# Patient Record
Sex: MALE | Race: BLACK OR AFRICAN AMERICAN | Employment: FULL TIME | URBAN - METROPOLITAN AREA
[De-identification: names, ages, dates, MRNs, and addresses within clinical notes are randomized per-mention and may not be internally consistent; named-entity substitution may affect disease eponyms.]

---

## 2020-08-21 ENCOUNTER — HOSPITAL ENCOUNTER (OUTPATIENT)
Age: 65
Discharge: HOME | End: 2020-08-21
Payer: COMMERCIAL

## 2020-08-21 LAB
BASOPHILS ABSOLUTE: 0.1 K/UL (ref 0–0.2)
BASOPHILS RELATIVE PERCENT: 1 %
EOSINOPHILS ABSOLUTE: 0.5 K/UL (ref 0–0.6)
EOSINOPHILS RELATIVE PERCENT: 4 %
HCT VFR BLD CALC: 39.8 % (ref 40.5–52.5)
HEMOGLOBIN: 13.3 G/DL (ref 13.5–17.5)
LYMPHOCYTES ABSOLUTE: 3.4 K/UL (ref 1–5.1)
LYMPHOCYTES RELATIVE PERCENT: 30 %
MCH RBC QN AUTO: 27.7 PG (ref 26–34)
MCHC RBC AUTO-ENTMCNC: 33.4 G/DL (ref 31–36)
MCV RBC AUTO: 83 FL (ref 80–100)
MONOCYTES ABSOLUTE: 1.4 K/UL (ref 0–1.3)
MONOCYTES RELATIVE PERCENT: 12 %
NEUTROPHILS ABSOLUTE: 6 K/UL (ref 1.7–7.7)
NEUTROPHILS RELATIVE PERCENT: 53 %
PDW BLD-RTO: 14.5 % (ref 12.4–15.4)
PLATELET # BLD: 296 K/UL (ref 135–450)
PLATELET SLIDE REVIEW: ADEQUATE
PMV BLD AUTO: 7.7 FL (ref 5–10.5)
RBC # BLD: 4.8 M/UL (ref 4.2–5.9)
RBC # BLD: NORMAL 10*6/UL
SLIDE REVIEW: ABNORMAL
WBC # BLD: 11.4 K/UL (ref 4–11)

## 2020-08-21 PROCEDURE — 85025 COMPLETE CBC W/AUTO DIFF WBC: CPT

## 2020-08-21 PROCEDURE — 36415 COLL VENOUS BLD VENIPUNCTURE: CPT

## 2020-09-03 ENCOUNTER — NURSE ONLY (OUTPATIENT)
Dept: PRIMARY CARE CLINIC | Age: 65
End: 2020-09-03
Payer: COMMERCIAL

## 2020-09-03 PROCEDURE — 99211 OFF/OP EST MAY X REQ PHY/QHP: CPT | Performed by: NURSE PRACTITIONER

## 2020-09-04 LAB — SARS-COV-2, NAA: NOT DETECTED

## 2020-09-08 ENCOUNTER — ANESTHESIA EVENT (OUTPATIENT)
Dept: ENDOSCOPY | Age: 65
End: 2020-09-08
Payer: COMMERCIAL

## 2020-09-08 NOTE — H&P
History and Physical / Pre-Sedation Assessment    Patient:  Olena Vazquez   :   1955     Intended Procedure:  EGD and colonoscopy    HPI: recurrent heartburn with dyspepsia and dysphagia. H/o multiple colon polyps over seven years ago. Fh of breast cancer    Past Medical History:  RBBB. Oa. hyperlipidemia    Past Surgical History:  none    Medications:  Prior to Admission medications    Medication Sig Start Date End Date Taking? Authorizing Provider   omeprazole (PRILOSEC) 20 MG capsule Take 20 mg by mouth daily. Historical Provider, MD   metoprolol (TOPROL XL) 25 MG XL tablet Take 1 tablet by mouth daily. 12   Ghazal Min MD       Family History: Mother with breast cancer. Social History:   reports that he has been smoking. He has never used smokeless tobacco. He reports that he does not drink alcohol or use drugs. Allergies:  PCN    ROS:  twelve point system review was unremarkable except for above noted history. Nurses notes reviewed and agreed. Medications reviewed    Physical Exam:  Vital Signs: There were no vitals taken for this visit. Skin: normal  HEENT: normal  Neck: supple. No adenopathy. No thyromegaly. No JVD. Pulmonary:Normal  Cardiac:Normal  Abdomen: tender epigastric area  MS: normal  Neuro: normal  Ext: no edema. Pulses normal    Pre-Procedure Assessment / Plan:  ASA 2 - Patient with mild systemic disease with no functional limitations  Mallampati Airway Assessment:  Mallampati Class I - (soft palate, fauces, uvula & anterior/posterior tonsillar pillars are visible)  Level of Sedation Plan:Mac sedation  Post Procedure plan: Return to same level of care    I assessed the patient and find that the patient is in satisfactory condition to proceed with the planned procedure and sedation plan. I have explained the risk, benefits, and alternatives to the procedure. The patient understands and agrees to proceed.   Karan Alvarez  10:47 AM 2020

## 2020-09-09 ENCOUNTER — ANESTHESIA (OUTPATIENT)
Dept: ENDOSCOPY | Age: 65
End: 2020-09-09
Payer: COMMERCIAL

## 2020-09-09 ENCOUNTER — HOSPITAL ENCOUNTER (OUTPATIENT)
Age: 65
Setting detail: OUTPATIENT SURGERY
Discharge: HOME | End: 2020-09-09
Attending: INTERNAL MEDICINE | Admitting: INTERNAL MEDICINE
Payer: COMMERCIAL

## 2020-09-09 VITALS
BODY MASS INDEX: 22.22 KG/M2 | DIASTOLIC BLOOD PRESSURE: 68 MMHG | WEIGHT: 150 LBS | RESPIRATION RATE: 16 BRPM | HEART RATE: 54 BPM | OXYGEN SATURATION: 97 % | HEIGHT: 69 IN | TEMPERATURE: 96.4 F | SYSTOLIC BLOOD PRESSURE: 136 MMHG

## 2020-09-09 VITALS — OXYGEN SATURATION: 98 % | DIASTOLIC BLOOD PRESSURE: 59 MMHG | SYSTOLIC BLOOD PRESSURE: 97 MMHG

## 2020-09-09 PROCEDURE — 7100000010 HC PHASE II RECOVERY - FIRST 15 MIN: Performed by: INTERNAL MEDICINE

## 2020-09-09 PROCEDURE — 3609012400 HC EGD TRANSORAL BIOPSY SINGLE/MULTIPLE: Performed by: INTERNAL MEDICINE

## 2020-09-09 PROCEDURE — 88342 IMHCHEM/IMCYTCHM 1ST ANTB: CPT

## 2020-09-09 PROCEDURE — 2580000003 HC RX 258: Performed by: ANESTHESIOLOGY

## 2020-09-09 PROCEDURE — 3609017700 HC EGD DILATION GASTRIC/DUODENAL STRICTURE: Performed by: INTERNAL MEDICINE

## 2020-09-09 PROCEDURE — 6360000002 HC RX W HCPCS: Performed by: NURSE ANESTHETIST, CERTIFIED REGISTERED

## 2020-09-09 PROCEDURE — C1726 CATH, BAL DIL, NON-VASCULAR: HCPCS | Performed by: INTERNAL MEDICINE

## 2020-09-09 PROCEDURE — 3609010300 HC COLONOSCOPY W/BIOPSY SINGLE/MULTIPLE: Performed by: INTERNAL MEDICINE

## 2020-09-09 PROCEDURE — 2709999900 HC NON-CHARGEABLE SUPPLY: Performed by: INTERNAL MEDICINE

## 2020-09-09 PROCEDURE — 7100000011 HC PHASE II RECOVERY - ADDTL 15 MIN: Performed by: INTERNAL MEDICINE

## 2020-09-09 PROCEDURE — 3700000001 HC ADD 15 MINUTES (ANESTHESIA): Performed by: INTERNAL MEDICINE

## 2020-09-09 PROCEDURE — 88305 TISSUE EXAM BY PATHOLOGIST: CPT

## 2020-09-09 PROCEDURE — 2500000003 HC RX 250 WO HCPCS: Performed by: NURSE ANESTHETIST, CERTIFIED REGISTERED

## 2020-09-09 PROCEDURE — 2580000003 HC RX 258: Performed by: NURSE ANESTHETIST, CERTIFIED REGISTERED

## 2020-09-09 PROCEDURE — 3700000000 HC ANESTHESIA ATTENDED CARE: Performed by: INTERNAL MEDICINE

## 2020-09-09 PROCEDURE — 2500000003 HC RX 250 WO HCPCS: Performed by: INTERNAL MEDICINE

## 2020-09-09 RX ORDER — SODIUM CHLORIDE 0.9 % (FLUSH) 0.9 %
10 SYRINGE (ML) INJECTION EVERY 12 HOURS SCHEDULED
Status: DISCONTINUED | OUTPATIENT
Start: 2020-09-09 | End: 2020-09-09 | Stop reason: HOSPADM

## 2020-09-09 RX ORDER — SODIUM CHLORIDE 0.9 % (FLUSH) 0.9 %
10 SYRINGE (ML) INJECTION PRN
Status: DISCONTINUED | OUTPATIENT
Start: 2020-09-09 | End: 2020-09-09 | Stop reason: HOSPADM

## 2020-09-09 RX ORDER — SODIUM CHLORIDE, SODIUM LACTATE, POTASSIUM CHLORIDE, CALCIUM CHLORIDE 600; 310; 30; 20 MG/100ML; MG/100ML; MG/100ML; MG/100ML
INJECTION, SOLUTION INTRAVENOUS CONTINUOUS
Status: DISCONTINUED | OUTPATIENT
Start: 2020-09-09 | End: 2020-09-09 | Stop reason: HOSPADM

## 2020-09-09 RX ORDER — SODIUM CHLORIDE 9 MG/ML
INJECTION, SOLUTION INTRAVENOUS CONTINUOUS
Status: DISCONTINUED | OUTPATIENT
Start: 2020-09-09 | End: 2020-09-09 | Stop reason: HOSPADM

## 2020-09-09 RX ORDER — GLYCOPYRROLATE 0.2 MG/ML
INJECTION INTRAMUSCULAR; INTRAVENOUS PRN
Status: DISCONTINUED | OUTPATIENT
Start: 2020-09-09 | End: 2020-09-09 | Stop reason: SDUPTHER

## 2020-09-09 RX ORDER — PROPOFOL 10 MG/ML
INJECTION, EMULSION INTRAVENOUS CONTINUOUS PRN
Status: DISCONTINUED | OUTPATIENT
Start: 2020-09-09 | End: 2020-09-09 | Stop reason: SDUPTHER

## 2020-09-09 RX ORDER — PROPOFOL 10 MG/ML
INJECTION, EMULSION INTRAVENOUS PRN
Status: DISCONTINUED | OUTPATIENT
Start: 2020-09-09 | End: 2020-09-09 | Stop reason: SDUPTHER

## 2020-09-09 RX ORDER — LIDOCAINE HYDROCHLORIDE 20 MG/ML
INJECTION, SOLUTION EPIDURAL; INFILTRATION; INTRACAUDAL; PERINEURAL PRN
Status: DISCONTINUED | OUTPATIENT
Start: 2020-09-09 | End: 2020-09-09 | Stop reason: SDUPTHER

## 2020-09-09 RX ORDER — SODIUM CHLORIDE, SODIUM LACTATE, POTASSIUM CHLORIDE, CALCIUM CHLORIDE 600; 310; 30; 20 MG/100ML; MG/100ML; MG/100ML; MG/100ML
INJECTION, SOLUTION INTRAVENOUS CONTINUOUS PRN
Status: DISCONTINUED | OUTPATIENT
Start: 2020-09-09 | End: 2020-09-09 | Stop reason: SDUPTHER

## 2020-09-09 RX ADMIN — SODIUM CHLORIDE, SODIUM LACTATE, POTASSIUM CHLORIDE, AND CALCIUM CHLORIDE: 600; 310; 30; 20 INJECTION, SOLUTION INTRAVENOUS at 09:55

## 2020-09-09 RX ADMIN — PROPOFOL 50 MG: 10 INJECTION, EMULSION INTRAVENOUS at 10:01

## 2020-09-09 RX ADMIN — LIDOCAINE HYDROCHLORIDE 5 ML: 20 INJECTION, SOLUTION EPIDURAL; INFILTRATION; INTRACAUDAL; PERINEURAL at 10:01

## 2020-09-09 RX ADMIN — PROPOFOL 30 MG: 10 INJECTION, EMULSION INTRAVENOUS at 10:02

## 2020-09-09 RX ADMIN — GLYCOPYRROLATE 0.2 MG: 0.2 INJECTION INTRAMUSCULAR; INTRAVENOUS at 09:59

## 2020-09-09 RX ADMIN — SODIUM CHLORIDE, POTASSIUM CHLORIDE, SODIUM LACTATE AND CALCIUM CHLORIDE: 600; 310; 30; 20 INJECTION, SOLUTION INTRAVENOUS at 09:22

## 2020-09-09 RX ADMIN — PROPOFOL 100 MCG/KG/MIN: 10 INJECTION, EMULSION INTRAVENOUS at 10:01

## 2020-09-09 ASSESSMENT — PULMONARY FUNCTION TESTS
PIF_VALUE: 0

## 2020-09-09 ASSESSMENT — PAIN SCALES - GENERAL
PAINLEVEL_OUTOF10: 0
PAINLEVEL_OUTOF10: 0

## 2020-09-09 ASSESSMENT — ENCOUNTER SYMPTOMS: SHORTNESS OF BREATH: 0

## 2020-09-09 ASSESSMENT — LIFESTYLE VARIABLES: SMOKING_STATUS: 1

## 2020-09-09 ASSESSMENT — PAIN SCALES - WONG BAKER
WONGBAKER_NUMERICALRESPONSE: 0

## 2020-09-09 ASSESSMENT — PAIN - FUNCTIONAL ASSESSMENT: PAIN_FUNCTIONAL_ASSESSMENT: 0-10

## 2020-09-09 NOTE — OP NOTE
[facility] [address], [address]                                OPERATIVE REPORT    PATIENT NAME: Clifton Celaya                     :        1955  MED REC NO:   [id number]                          ROOM:  ACCOUNT NO:   [de-identified]                           ADMIT DATE: 2020  PROVIDER:     Eliceo Mckeon MD    DATE OF PROCEDURE:  2020    SURGEON:  Eliceo Mckeon MD    INDICATIONS FOR PROCEDURE:  1.  Heartburn, dyspepsia, and dysphagia. 2.  History of numerous colon polyps. 3.  Family history of breast cancer. EGD:  With the patient in the left lateral position and after IV  Diprivan, the Olympus video endoscope was introduced into the esophagus  and advanced toward the gastroesophageal junction. Small hiatus hernia  was noted, from which biopsies were obtained. Given the patient's  history of dysphagia, trial of dilation was performed using balloon,  size 18 mm. Stomach was carefully inspected and revealed mild antral  gastritis and biopsies were obtained to evaluate for Helicobacter  pylori. The duodenum was normal.  Scope was then removed without  complications. COLONOSCOPY:  The Olympus video colonoscope was then inserted into the  rectum and carefully advanced to the cecum and redundant colon. Two  rectosigmoid polyps were seen and they were both removed with the biopsy  forceps. Careful inspection revealed no other abnormality. Procedure  was terminated without complications. IMPRESSION:  1. Small hiatus hernia. 2.  Short segment Zafar's esophagus. 3.  Mild gastritis. 4.  Rectosigmoid polyps.    ebl none    Thank you, Dr. Alma Santos and Dr. Tera Grajeda, for your kind referral of this  patient. Florencio Freed MD    D: 2020 10:56:29       T: 2020 11:20:24     [email or web address]  Job#: [id number]     Doc#: [id number]    CC:   MD Samira Page MD Jeronimo Lute, MD

## 2020-09-09 NOTE — ANESTHESIA PRE PROCEDURE
Department of Anesthesiology  Preprocedure Note       Name:  Lawence Room   Age:  76 y.o.  :  1955                                          MRN:  [id number]         Date:  2020      Surgeon: Katlyn Garcia):  Francesca Beverly MD    Procedure: Procedure(s):  COLONOSCOPY/  ESOPHAGOGASTRODUODENOSCOPY    Medications prior to admission:   Prior to Admission medications    Medication Sig Start Date End Date Taking? Authorizing Provider   omeprazole (PRILOSEC) 20 MG capsule Take 20 mg by mouth daily. Yes Historical Provider, MD   metoprolol (TOPROL XL) 25 MG XL tablet Take 1 tablet by mouth daily. 12   Caron Elizabeth MD       Current medications:    Current Facility-Administered Medications   Medication Dose Route Frequency Provider Last Rate Last Dose    sodium chloride flush 0.9 % injection 10 mL  10 mL Intravenous 2 times per day Librado De La Paz, DO        sodium chloride flush 0.9 % injection 10 mL  10 mL Intravenous PRN Librado De La Paz, DO        0.9 % sodium chloride infusion   Intravenous Continuous Librado De La Paz, DO        lactated ringers infusion   Intravenous Continuous Librado De La Paz, DO           Allergies: Allergies   Allergen Reactions    Pcn [Penicillins]        Problem List:    Patient Active Problem List   Diagnosis Code    GERD (gastroesophageal reflux disease) K21.9    Hyperlipidemia E78.5    Incomplete RBBB I45.10    Osteoarthritis of lumbar spine M47.816       Past Medical History:        Diagnosis Date    Arthritis     GERD (gastroesophageal reflux disease)     Hyperlipidemia     Incomplete RBBB        Past Surgical History:  History reviewed. No pertinent surgical history. Social History:    Social History     Tobacco Use    Smoking status: Current Every Day Smoker    Smokeless tobacco: Never Used   Substance Use Topics    Alcohol use:  No                                Ready to quit: Not Answered  Counseling given: Not Answered      Vital Signs (Current):   Vitals:    20 0849   BP: (!) 141/61   Pulse: 110   Resp: 16   Temp: 96.5 °F (35.8 °C)   TempSrc: Temporal   SpO2: 97%   Weight: 150 lb (68 kg)   Height: 5' 9\" (1.753 m)                                              BP Readings from Last 3 Encounters:   09/09/20 (!) 141/61   12/16/14 118/70   09/16/14 120/80       NPO Status:                                                                                 BMI:   Wt Readings from Last 3 Encounters:   09/09/20 150 lb (68 kg)   12/16/14 174 lb (78.9 kg)   11/20/12 174 lb (78.9 kg)     Body mass index is 22.15 kg/m². CBC:   Lab Results   Component Value Date    WBC 11.4 08/21/2020    RBC 4.80 08/21/2020    HGB 13.3 08/21/2020    HCT 39.8 08/21/2020    MCV 83.0 08/21/2020    RDW 14.5 08/21/2020     08/21/2020       CMP:   Lab Results   Component Value Date     05/24/2012    K 4.2 05/24/2012     05/24/2012    CO2 30 05/24/2012    BUN 7 05/24/2012    CREATININE 0.9 05/24/2012    GFRAA >60 05/24/2012    GLUCOSE 91 05/24/2012    PROT 8.7 05/24/2012    CALCIUM 10.1 05/24/2012    BILITOT 1.00 05/24/2012    ALKPHOS 102 05/24/2012    AST 35 05/24/2012    ALT 38 05/24/2012       POC Tests: No results for input(s): POCGLU, POCNA, POCK, POCCL, POCBUN, POCHEMO, POCHCT in the last 72 hours.     Coags: No results found for: PROTIME, INR, APTT    HCG (If Applicable): No results found for: PREGTESTUR, PREGSERUM, HCG, HCGQUANT     ABGs: No results found for: PHART, PO2ART, MCZ0BLF, IGK5FXR, BEART, F5KVVAZJ     Type & Screen (If Applicable):  No results found for: LABABO, LABRH    Drug/Infectious Status (If Applicable):  No results found for: HIV, HEPCAB    COVID-19 Screening (If Applicable):   Lab Results   Component Value Date    COVID19 NOT DETECTED 09/03/2020         Anesthesia Evaluation    Airway: Mallampati: II  TM distance: >3 FB   Neck ROM: full  Mouth opening: > = 3 FB Dental:    (+) upper dentures, partials and lower dentures      Pulmonary: breath sounds clear to

## 2020-09-09 NOTE — PROGRESS NOTES
Ambulatory Surgery/Procedure Discharge Note    Vitals:    09/09/20 1120   BP: 136/68   Pulse: 54   Resp: 16   Temp:    SpO2: 97%       In: 500 [I.V.:500]  Out: - 2    Restroom use offered before discharge. Yes    Pain assessment:  none  Pain Level: 0    This RN took over care of pt at 1200 from [address]    Pt alert; speech clear; breathing easily on RA; sitting in chair fully dressed when this RN took over care. Pt denied any pain. IV removed, and dressing applied. Discharge instructions were discussed with pt's wife per Khushi , RN, and she verbalized understanding. Dr. Diana Lundberg came to bedside to talk with pt prior to his discharge. Patient discharged to home/self care.  Patient discharged via wheel chair by RN to waiting family/S.O.       9/9/2020 12:48 PM

## 2020-09-09 NOTE — PROGRESS NOTES
Ambulatory Surgery/Procedure Discharge Note    Vitals:    09/09/20 1105   BP: 126/68   Pulse: 56   Resp: 15   Temp: 96.4 °F (35.8 °C)   SpO2: 98%       In: 500 [I.V.:500]  Out: -     Restroom use offered before discharge. Yes  Pt tolerating PO well and denies nausea and vomiting. Pain assessment:  none  Pain Level: 0        Patient discharged to home/self care.  Patient discharged via wheel chair by this nurse and waiting for Dr. Yosvany Miller to speak to the patient    9/9/2020 11:07 AM

## 2020-09-09 NOTE — ANESTHESIA POSTPROCEDURE EVALUATION
Department of Anesthesiology  Postprocedure Note    Patient: Jennifer Diggs  MRN: [id number]  [place]: 1955  Date of evaluation: 9/9/2020  Time:  11:41 AM     Procedure Summary     Date:  09/09/20 Room / Location:  [address] [place] [address] Melany Fon[address]    Anesthesia Start:  2009 Anesthesia Stop:  1042    Procedures:       EGD BIOPSY (N/A )      EGD DILATION BALLOON (N/A )      COLONOSCOPY WITH BIOPSY Diagnosis:       H/O adenomatous polyp of colon      Dysphagia, unspecified type      Dyspepsia      (H/O adenomatous polyp of colon [Z86.010] Dysphagia, unspecified type [R13.10] Dyspepsia [R10.13])    Surgeon:  Annamarie Mancilla MD Responsible Provider:  Hellen Simon MD    Anesthesia Type:  MAC ASA Status:  2          Anesthesia Type: MAC    Jimena Phase I: Jimena Score: 10    Jimena Phase II: Jimena Score: 10    Last vitals: Reviewed and per EMR flowsheets. Anesthesia Post Evaluation    Patient location: phase 2.   Level of consciousness: awake and alert  Airway patency: patent  Nausea & Vomiting: no vomiting  Complications: no  Cardiovascular status: blood pressure returned to baseline  Respiratory status: acceptable  Hydration status: euvolemic

## 2020-09-17 ENCOUNTER — OFFICE VISIT (OUTPATIENT)
Dept: FAMILY MEDICINE CLINIC | Age: 65
End: 2020-09-17
Payer: COMMERCIAL

## 2020-09-17 VITALS
OXYGEN SATURATION: 99 % | TEMPERATURE: 98.4 F | HEART RATE: 71 BPM | HEIGHT: 69 IN | SYSTOLIC BLOOD PRESSURE: 130 MMHG | BODY MASS INDEX: 22.96 KG/M2 | WEIGHT: 155 LBS | DIASTOLIC BLOOD PRESSURE: 72 MMHG

## 2020-09-17 PROBLEM — F19.10 SUBSTANCE ABUSE (HCC): Status: ACTIVE | Noted: 2020-09-17

## 2020-09-17 PROBLEM — F10.10 ALCOHOL ABUSE: Status: ACTIVE | Noted: 2020-09-17

## 2020-09-17 LAB
C-REACTIVE PROTEIN: <0.3 MG/L (ref 0–5.1)
CHOLESTEROL, TOTAL: 146 MG/DL (ref 0–199)
HDLC SERPL-MCNC: 60 MG/DL (ref 40–60)
LDL CHOLESTEROL CALCULATED: 64 MG/DL
T4 FREE: 1.3 NG/DL (ref 0.9–1.8)
TRIGL SERPL-MCNC: 111 MG/DL (ref 0–150)
TSH SERPL DL<=0.05 MIU/L-ACNC: 0.97 UIU/ML (ref 0.27–4.2)
VLDLC SERPL CALC-MCNC: 22 MG/DL

## 2020-09-17 PROCEDURE — 99203 OFFICE O/P NEW LOW 30 MIN: CPT | Performed by: FAMILY MEDICINE

## 2020-09-17 PROCEDURE — 99406 BEHAV CHNG SMOKING 3-10 MIN: CPT | Performed by: FAMILY MEDICINE

## 2020-09-17 RX ORDER — METHYLPREDNISOLONE 4 MG/1
TABLET ORAL
Qty: 1 KIT | Refills: 0 | Status: SHIPPED | OUTPATIENT
Start: 2020-09-17 | End: 2020-09-23

## 2020-09-17 RX ORDER — OMEPRAZOLE 10 MG/1
10 CAPSULE, DELAYED RELEASE ORAL DAILY
COMMUNITY
End: 2021-11-01

## 2020-09-17 ASSESSMENT — PATIENT HEALTH QUESTIONNAIRE - PHQ9
SUM OF ALL RESPONSES TO PHQ QUESTIONS 1-9: 0
SUM OF ALL RESPONSES TO PHQ QUESTIONS 1-9: 0
SUM OF ALL RESPONSES TO PHQ9 QUESTIONS 1 & 2: 0
2. FEELING DOWN, DEPRESSED OR HOPELESS: 0
1. LITTLE INTEREST OR PLEASURE IN DOING THINGS: 0

## 2020-09-17 ASSESSMENT — ENCOUNTER SYMPTOMS
DIARRHEA: 0
BLOOD IN STOOL: 0
SINUS PRESSURE: 0
CONSTIPATION: 0
RHINORRHEA: 0
VOMITING: 0
SHORTNESS OF BREATH: 0
TROUBLE SWALLOWING: 0
ABDOMINAL DISTENTION: 0
WHEEZING: 0
COUGH: 0
ABDOMINAL PAIN: 0
BACK PAIN: 0
CHEST TIGHTNESS: 0
NAUSEA: 0

## 2020-09-17 NOTE — PROGRESS NOTES
related to a possible viral infection, but I did palpate some cervical lymphadenopathy. Orders:  -     C-REACTIVE PROTEIN; Future  -     methylPREDNISolone (MEDROL DOSEPACK) 4 MG tablet; Take by mouth. -     C-REACTIVE PROTEIN    Current every day smoker  -     CT Lung Screen (Initial or Annual); Future    Substance abuse (HCC)  -     HEPATITIS PANEL, ACUTE; Future  -     HIV Screen; Future  -     HIV Screen  -     HEPATITIS PANEL, ACUTE    Screening for AAA (abdominal aortic aneurysm)  -     US screening for AAA; Future    Other problems related to lifestyle    Screening for hyperlipidemia  -     Lipid Panel; Future  -     Lipid Panel    Alcohol abuse    Diabetes mellitus screening  -     HEMOGLOBIN A1C; Future  -     HEMOGLOBIN A1C    Preventative health care  -     TSH without Reflex; Future  -     T4, Free; Future  -     T4, Free  -     TSH without Reflex    Personal history of tobacco use, presenting hazards to health  -     MO TOBACCO USE CESSATION INTERMEDIATE 3-10 MINUTES [75907]       Medications Discontinued During This Encounter   Medication Reason    metoprolol (TOPROL XL) 25 MG XL tablet LIST CLEANUP    omeprazole (PRILOSEC) 20 MG capsule LIST CLEANUP      Patient was informed that whether starting or adding a new medication or increasing the dose of a current medication, the benefits and risks have to be considered and weighed over. Patient was also informed that there are no medications that have no side effects and if a side effect were to occur with starting a new medication or with increasing the dose of a current medication that either the medication can be totally discontinued altogether or simply decrease the dose of it and based on this, a follow-up appointment is necessary. The drug allergy list will then be updated with the corresponding side effects if it's deemed to be a true 'drug allergy'. The most common adverse effects of medication(s) were addressed at today's visit.   Lastly, the patient was informed that the coverage status of a medication may vary from insurance to insurance and the only way to verify if the medication is covered is to send an actual prescription in. The patient was also informed that the cost of medications vary from insurance to insurance. I reviewed the plan of care with the patient. Patient acknowledged understanding and agreed with plan of care overall. Estimated length of time of today's visit: 30 minutes    PDMP Monitoring:   Last PDMP [facility] as Reviewed [facility]):  Review User Review Instant Review Result   DAREN NORRIS 9/17/2020  5:50 PM Reviewed PDMP [1]     Follow-up: Return in about 22 days (around 10/9/2020) for lump in mouth - IP. Srinivas Tucker Patient was informed that if his or her symptoms worsen to return here or go to nearest ER if symptoms significantly worsen. Daren Frias 177    Electronically signed by Kae Groves MD on 9/17/2020 at 5:51 PM.

## 2020-09-18 PROBLEM — R73.03 PREDIABETES: Status: ACTIVE | Noted: 2020-09-18

## 2020-09-18 LAB
ESTIMATED AVERAGE GLUCOSE: 122.6 MG/DL
HAV IGM SER IA-ACNC: ABNORMAL
HBA1C MFR BLD: 5.9 %
HEPATITIS B CORE IGM ANTIBODY: ABNORMAL
HEPATITIS B SURFACE ANTIGEN INTERPRETATION: ABNORMAL
HEPATITIS C ANTIBODY INTERPRETATION: REACTIVE
HIV AG/AB: NORMAL
HIV ANTIGEN: NORMAL
HIV-1 ANTIBODY: NORMAL
HIV-2 AB: NORMAL

## 2020-10-02 ENCOUNTER — HOSPITAL ENCOUNTER (OUTPATIENT)
Age: 65
Discharge: HOME | End: 2020-10-02
Payer: COMMERCIAL

## 2020-10-02 LAB
ALBUMIN SERPL-MCNC: 4.3 G/DL (ref 3.4–5)
ALP BLD-CCNC: 110 U/L (ref 40–129)
ALT SERPL-CCNC: 33 U/L (ref 10–40)
AST SERPL-CCNC: 36 U/L (ref 15–37)
BILIRUB SERPL-MCNC: 0.5 MG/DL (ref 0–1)
BILIRUBIN DIRECT: <0.2 MG/DL (ref 0–0.3)
BILIRUBIN, INDIRECT: NORMAL MG/DL (ref 0–1)
TOTAL PROTEIN: 7.8 G/DL (ref 6.4–8.2)

## 2020-10-02 PROCEDURE — 87522 HEPATITIS C REVRS TRNSCRPJ: CPT

## 2020-10-02 PROCEDURE — 36415 COLL VENOUS BLD VENIPUNCTURE: CPT

## 2020-10-02 PROCEDURE — 80076 HEPATIC FUNCTION PANEL: CPT

## 2020-10-03 ENCOUNTER — HOSPITAL ENCOUNTER (OUTPATIENT)
Dept: ULTRASOUND IMAGING | Age: 65
Discharge: HOME | End: 2020-10-03
Payer: COMMERCIAL

## 2020-10-07 LAB
HCV QNT BY NAAT IU/ML: ABNORMAL
HCV QNT BY NAAT LOG IU/ML: 6.68 LOG IU/ML
INTERPRETATION: DETECTED

## 2020-10-08 ASSESSMENT — ENCOUNTER SYMPTOMS
VOMITING: 0
SINUS PRESSURE: 0
CONSTIPATION: 0
TROUBLE SWALLOWING: 0
RHINORRHEA: 0
WHEEZING: 0
DIARRHEA: 0
SHORTNESS OF BREATH: 0
NAUSEA: 0
ABDOMINAL PAIN: 0
BLOOD IN STOOL: 0
COUGH: 0
CHEST TIGHTNESS: 0
ABDOMINAL DISTENTION: 0
BACK PAIN: 0

## 2020-10-08 NOTE — PROGRESS NOTES
Bhavin Leyva   76 y.o. male   1955    HPI:  Chief Complaint   Patient presents with    Other     Lump in mouth. A lot smaller but not completely gone. Patient was last seen by me on 9/17/2020. On the last visit with me, I prescribed a Medrol Dosepak to see if he could decrease the size of a mass located on the floor of his mouth. Lab work was also obtained after his visit with me. We reviewed all labs in detail together. It is significant for A1c of 5.9. LFT, TSH, free T4, ESR, CRP are all normal.  He is negative for HIV. Hepatitis panel was ordered and was positive for hepatitis C antibody. Hepatitis C viral showed 9,999,572. He is unsure of where he may have contracted hepatitis C because although he admitted long history of IV drug use, he repeatedly made it clear to me that he has not used any illicit drugs in about 15 years. Regarding his diagnosis of prediabetes, patient stated that is the first time he is aware about this. He stated that he does not drink soda or juice and mainly drinks water. No family history of diabetes. Allergies   Allergen Reactions    Pcn [Penicillins]        Current Outpatient Medications on File Prior to Visit   Medication Sig Dispense Refill    omeprazole (PRILOSEC) 10 MG delayed release capsule Take 10 mg by mouth daily       No current facility-administered medications on file prior to visit. Family History   Problem Relation Age of Onset   [facility] Breast Cancer Mother 61     Social History     Tobacco Use    Smoking status: Current Every Day Smoker     Packs/day: 0.50     Years: 54.00     Pack years: 27.00     Types: Cigarettes    Smokeless tobacco: Never Used   Substance Use Topics    Alcohol use: Not Currently     Comment: Pt reported that he stopped drinking 13 years ago. No results for input(s): WBC, HGB, HCT, MCV, PLT in the last 720 hours.        Chemistry        Component Value Date/Time     05/24/2012 1055    K 4.2 Head: Normocephalic and atraumatic. Right Ear: External ear normal.      Left Ear: External ear normal.      Nose: Nose normal.   Eyes:      Extraocular Movements: Extraocular movements intact. Conjunctiva/sclera: Conjunctivae normal.   Neck:      Musculoskeletal: Normal range of motion. No erythema. Cardiovascular:      Rate and Rhythm: Normal rate and regular rhythm. Pulmonary:      Effort: Pulmonary effort is normal. No respiratory distress. Breath sounds: No wheezing, rhonchi or rales. Abdominal:      General: Abdomen is flat. There is no distension. Palpations: Abdomen is soft. Musculoskeletal: Normal range of motion. General: No deformity. Right lower leg: No edema. Left lower leg: No edema. Skin:     Coloration: Skin is not cyanotic, jaundiced or pale. Findings: No abrasion, abscess, bruising, ecchymosis, erythema, signs of injury, laceration, lesion, petechiae, rash or wound. Comments: Refer to image below   Neurological:      General: No focal deficit present. Mental Status: He is alert. Mental status is at baseline. Coordination: Coordination normal.   Psychiatric:         Attention and Perception: Attention and perception normal.         Mood and Affect: Mood and affect normal.         Speech: Speech normal.         Behavior: Behavior normal. Behavior is cooperative. Thought Content: Thought content normal.            Imaging was obtained with patient's consent. Patient was informed that the pictures were taken with my own personal mobile phone and uploaded via mobile wang (called Haiku) directly onto patient's electronic health chart. Patient was also informed that the images would not be saved on my mobile phone. Assessment/Plan:   Leonidas Melgoza was seen today for other.     Diagnoses and all orders for this visit:    Ch[facility] hepatic [facility]  Comments:  Patient's lab work overall shows no significant abnormalities. Patient was advised to inform his wife of his diagnosis and that she be tested herself. Orders:  -     HEPATITIS C GENOTYPE; Future  -     APTT; Future  -     PROTIME-INR; Future  -     US ORGAN ELASTOGRAPHY; Future    Mass of floor of mouth  Comments:  Improved after completing Medrol Dosepak. Very tiny residual nodule at the base of the tongue noted. Will monitor. No signs of infection anywhere noted. Venous insufficiency of both lower extremities  Comments:  Likely related to his chronic history of IV drug use. No track marks noted on the rest of his body. Varicose veins appear to be chronic. No signs of ulceration noted. Prediabetes  Comments:  A1c of 5.9. Recommended to patient low-carb/sugar, smaller portion sizes, minimize drinking sugary beverages, exercise much as possible, etc.    Hepatitis B vaccination status unknown  -     HEPATITIS B SURFACE ANTIBODY; Future    History of drug abuse in remission [facility])  Comments:  Denied drug use. Stable. Need for pneumococcal vaccination  -     Pneumococcal polysaccharide vaccine 23-valent greater than or equal to 3yo subcutaneous/IM    Elevated blood pressure without diagnosis of hypertension       I reviewed the plan of care with the patient. Patient acknowledged understanding and agreed with plan of care overall. There are no discontinued medications. Patient was informed that whether starting or adding a new medication or increasing the dose of a current medication, the benefits and risks have to be considered and weighed over. Patient was also informed that there are no medications that have no side effects and if a side effect were to occur with starting a new medication or with increasing the dose of a current medication that either the medication can be totally discontinued altogether or simply decrease the dose of it and based on this, a follow-up appointment is necessary.   The drug allergy list will then be updated with the corresponding side effects if it's deemed to be a true 'drug allergy'. The most common adverse effects of medication(s) were addressed at today's visit. Lastly, the patient was informed that the coverage status of a medication may vary from insurance to insurance and the only way to verify if the medication is covered is to send an actual prescription in. The patient was also informed that the cost of medications vary from insurance to insurance. I reviewed the plan of care with the patient. Patient acknowledged understanding and agreed with plan of care overall. Estimated length of time of today's visit: 25 minutes    PDMP Monitoring:   Last PDMP [employer] as Reviewed [facility]):  Review User Review Instant Review Result   DAREN NORRIS 10/9/2020 12:33 PM Reviewed PDMP [1]     Follow-up: Return in about 4 weeks (around 11/6/2020) for IP - HTN. . Patient was informed that if his or her symptoms worsen to return here or go to nearest ER if symptoms significantly worsen. Daren Frias 177    Electronically signed by Amy So MD on 10/9/2020 at 12:39 PM.

## 2020-10-09 ENCOUNTER — OFFICE VISIT (OUTPATIENT)
Dept: FAMILY MEDICINE CLINIC | Age: 65
End: 2020-10-09
Payer: COMMERCIAL

## 2020-10-09 VITALS
OXYGEN SATURATION: 99 % | WEIGHT: 156.4 LBS | SYSTOLIC BLOOD PRESSURE: 149 MMHG | HEART RATE: 73 BPM | BODY MASS INDEX: 23.1 KG/M2 | TEMPERATURE: 97.9 F | DIASTOLIC BLOOD PRESSURE: 82 MMHG

## 2020-10-09 PROBLEM — F19.11 HISTORY OF DRUG ABUSE IN REMISSION (HCC): Status: ACTIVE | Noted: 2020-10-09

## 2020-10-09 LAB
APTT: 34 SEC (ref 24.2–36.2)
HBV SURFACE AB TITR SER: <3.5 MIU/ML
INR BLD: 0.95 (ref 0.86–1.14)
PROTHROMBIN TIME: 11 SEC (ref 10–13.2)

## 2020-10-09 PROCEDURE — 99214 OFFICE O/P EST MOD 30 MIN: CPT | Performed by: FAMILY MEDICINE

## 2020-10-09 PROCEDURE — 90471 IMMUNIZATION ADMIN: CPT | Performed by: FAMILY MEDICINE

## 2020-10-09 PROCEDURE — 90732 PPSV23 VACC 2 YRS+ SUBQ/IM: CPT | Performed by: FAMILY MEDICINE

## 2020-10-09 NOTE — PATIENT INSTRUCTIONS
(jaundice). · Dark urine. How can you prevent hepatitis C? There is no vaccine to prevent the disease. Anyone who has hepatitis C can spread the virus to someone else. You can take steps to make infection less likely. · Do not share needles to inject drugs. · Follow safety guidelines if you work in a health care setting. Wear protective gloves and clothing. Dispose of needles and other sharp objects properly. · Make sure all instruments and supplies are sterilized if you get a tattoo, have your body pierced, or have acupuncture. To avoid spreading hepatitis C if you have it:  · Do not share needles or other equipment, such as cotton, spoons, and water, if you use needles to inject drugs. · Keep cuts, scrapes, and blisters covered. This will prevent others from coming in contact with your blood and other body fluids. Throw out any blood-soaked items such as used bandages. · Do not donate blood or sperm. · Wash your hands and anything that has come in contact with your blood. Use soap and water. · Do not share your toothbrush, razor, nail clippers, or anything else that might have your blood on it. · Use latex condoms during sex if you have HIV, multiple sex partners, or a sexually transmitted infection. How is hepatitis C treated? · If you have acute hepatitis C, your doctor will probably prescribe medicine. · If you have chronic hepatitis C, your treatment depends on whether you have liver damage, other health problems you may have, and how much virus is in your body and what type it is. · You will need to see your doctor regularly to have blood tests to check your liver. Follow-up care is a key part of your treatment and safety. Be sure to make and go to all appointments, and call your doctor if you are having problems. It's also a good idea to know your test results and keep a list of the medicines you take. Where can you learn more? Go to https://jigna.[facility]. org and sign in to your [employer] account. Enter C666 in the [facility] box to learn more about \"Learning About Hepatitis C. \"     If you do not have an account, please click on the \"Sign Up Now\" link. Current as of: February 11, 2020               Content Version: 12.6  © 7714-3947 [employer]. Care instructions adapted under license by [facility] ([place] If you have questions about a medical condition or this instruction, always ask your healthcare professional. [address] any warranty or liability for your use of this information.

## 2020-10-15 PROBLEM — B18.2: Status: ACTIVE | Noted: 2020-10-15

## 2020-10-15 LAB — HEPATITIS C GENOTYPE: NORMAL

## 2020-10-23 ENCOUNTER — HOSPITAL ENCOUNTER (OUTPATIENT)
Dept: ULTRASOUND IMAGING | Age: 65
Discharge: HOME | End: 2020-10-23
Payer: COMMERCIAL

## 2020-10-23 PROCEDURE — 76706 US ABDL AORTA SCREEN AAA: CPT

## 2020-11-05 NOTE — PROGRESS NOTES
Marshal Dandy   76 y.o. male   1955    HPI:  Chief Complaint   Patient presents with    Hypertension       Patient was last seen by me on 10/9/2020. On the last visit with me, he had elevated BP and so advised him to come back to re-check. Since his last office visit, I had referred him to GI for treatment of hepatitis C. He stated that he will have an upcoming appointment in the next coming weeks. I advised him on her last visit to disclose his hepatitis C status to his wife. Patient stated that he did and that she plans to contact her PCP to have her self checked. Patient has not had any change in his overall health since his last visit. No issues of development of jaundice, scleral icterus, abdominal distention, spontaneous ecchymosis/purpura, change in stool, abnormal weight changes, etc.    Allergies   Allergen Reactions    Pcn [Penicillins]        Current Outpatient Medications on File Prior to Visit   Medication Sig Dispense Refill    omeprazole (PRILOSEC) 10 MG delayed release capsule Take 10 mg by mouth daily       No current facility-administered medications on file prior to visit. Family History   Problem Relation Age of Onset   [facility] Breast Cancer Mother 61     Social History     Tobacco Use    Smoking status: Current Every Day Smoker     Packs/day: 0.50     Years: 54.00     Pack years: 27.00     Types: Cigarettes    Smokeless tobacco: Never Used   Substance Use Topics    Alcohol use: Not Currently     Comment: Pt reported that he stopped drinking 13 years ago. No results for input(s): WBC, HGB, HCT, MCV, PLT in the last 720 hours.        Chemistry        Component Value Date/Time     05/24/2012 1055    K 4.2 05/24/2012 1055     05/24/2012 1055    CO2 30 05/24/2012 1055    BUN 7 05/24/2012 1055    CREATININE 0.9 05/24/2012 1055        Component Value Date/Time    CALCIUM 10.1 05/24/2012 1055    ALKPHOS 110 10/02/2020 1006    AST 36 10/02/2020 1006    ALT 33 10/02/2020 1006    BILITOT 0.5 10/02/2020 1006          Lab Results   Component Value Date    ALT 33 10/02/2020    AST 36 10/02/2020    ALKPHOS 110 10/02/2020    BILITOT 0.5 10/02/2020     Lab Results   Component Value Date    LABA1C 5.9 09/17/2020     Lab Results   Component Value Date    .6 09/17/2020       Review of Systems   Constitutional: Negative for activity change, appetite change, fatigue, fever and unexpected weight change. HENT: Negative for congestion, rhinorrhea, sinus pressure and trouble swallowing. Respiratory: Negative for cough, chest tightness, shortness of breath and wheezing. Cardiovascular: Negative for chest pain, palpitations and leg swelling. Gastrointestinal: Negative for abdominal distention, abdominal pain, blood in stool, constipation, diarrhea, nausea and vomiting. Genitourinary: Negative for dysuria, frequency and hematuria. Musculoskeletal: Negative for arthralgias and back pain. Skin: Negative for rash. Neurological: Negative for dizziness, weakness, light-headedness, numbness and headaches. Wt Readings from Last 3 Encounters:   11/06/20 156 lb (70.8 kg)   10/09/20 156 lb 6.4 oz (70.9 kg)   09/17/20 155 lb (70.3 kg)       BP Readings from Last 3 Encounters:   11/06/20 138/78   10/09/20 (!) 149/82   09/17/20 130/72       Pulse Readings from Last 3 Encounters:   11/06/20 79   10/09/20 73   09/17/20 71       /78   Pulse 79   Temp 98.2 °F (36.8 °C)   Ht 5' 9\" (1.753 m)   Wt 156 lb (70.8 kg)   SpO2 98%   BMI 23.04 kg/m²      Physical Exam  Vitals signs reviewed. Constitutional:       General: He is awake. He is not in acute distress. Appearance: He is not ill-appearing or diaphoretic. HENT:      Head: Normocephalic and atraumatic. Right Ear: External ear normal.      Left Ear: External ear normal.      Nose: Nose normal.   Eyes:      Extraocular Movements: Extraocular movements intact.       Conjunctiva/sclera: Conjunctivae normal. Neck:      Musculoskeletal: Normal range of motion. No erythema. Cardiovascular:      Rate and Rhythm: Normal rate and regular rhythm. Pulmonary:      Effort: Pulmonary effort is normal. No respiratory distress. Breath sounds: No wheezing, rhonchi or rales. Abdominal:      General: Abdomen is flat. There is no distension. Palpations: Abdomen is soft. Musculoskeletal: Normal range of motion. General: No deformity. Right lower leg: No edema. Left lower leg: No edema. Skin:     Coloration: Skin is not cyanotic, jaundiced or pale. Findings: No abrasion, abscess, bruising, ecchymosis, erythema, signs of injury, laceration, lesion, petechiae, rash or wound. Neurological:      General: No focal deficit present. Mental Status: He is alert. Mental status is at baseline. Coordination: Coordination normal.   Psychiatric:         Attention and Perception: Attention and perception normal.         Mood and Affect: Mood and affect normal.         Speech: Speech normal.         Behavior: Behavior normal. Behavior is cooperative. Thought Content: Thought content normal.        Assessment/Plan:   Aristeo was seen today for hypertension. Diagnoses and all orders for this visit:    Essential hypertension  Comments:  BP stable not reliant on BP meds. Will monitor. Chronic hepatitis C virus genotype 1 infection ([facility] [address]  Comments: Follow-up with GI as directed. History of drug abuse in remission [facility])       I reviewed the plan of care with the patient. Patient acknowledged understanding and agreed with plan of care overall. There are no discontinued medications. Patient was informed that whether starting or adding a new medication or increasing the dose of a current medication, the benefits and risks have to be considered and weighed over, especially if the patient is taking other medications as well.  Patient was also informed that there are no medications that have no side effects and there is always a risk involved with taking a medication. If a side effect were to occur with starting a new medication or with increasing the dose of a current medication that either the medication can be totally discontinued altogether or simply decrease the dose of it and based on this, a follow-up appointment is necessary. The drug allergy list will then be updated with the corresponding side effects if it's deemed to be a true 'drug allergy'. The most common adverse effects of medication(s) were addressed at today's visit. Lastly, the patient was informed that the coverage status of a medication may vary from insurance to insurance and the only way to verify if the medication is covered is to send an actual prescription in. The patient was also informed that the cost of medications vary from insurance to insurance. I reviewed the plan of care with the patient. Patient acknowledged understanding and agreed with plan of care overall. Estimated length of time of today's visit: 10 minutes    Follow-up: Return in about 3 months (around 2/6/2021) for IP - arthritis, HTN. . Patient was informed that if his or her symptoms worsen to return here or go to nearest ER if symptoms significantly worsen. Daren Villeda M.D.   [address] [facility]    Electronically signed by Kate Manriquez on 11/7/2020 at 11:43 AM.

## 2020-11-06 ENCOUNTER — OFFICE VISIT (OUTPATIENT)
Dept: FAMILY MEDICINE CLINIC | Age: 65
End: 2020-11-06
Payer: COMMERCIAL

## 2020-11-06 VITALS
HEART RATE: 79 BPM | BODY MASS INDEX: 23.11 KG/M2 | WEIGHT: 156 LBS | SYSTOLIC BLOOD PRESSURE: 138 MMHG | HEIGHT: 69 IN | DIASTOLIC BLOOD PRESSURE: 78 MMHG | OXYGEN SATURATION: 98 % | TEMPERATURE: 98.2 F

## 2020-11-06 PROCEDURE — 99212 OFFICE O/P EST SF 10 MIN: CPT | Performed by: FAMILY MEDICINE

## 2020-11-06 ASSESSMENT — ENCOUNTER SYMPTOMS
CONSTIPATION: 0
TROUBLE SWALLOWING: 0
ABDOMINAL DISTENTION: 0
DIARRHEA: 0
NAUSEA: 0
SINUS PRESSURE: 0
BLOOD IN STOOL: 0
WHEEZING: 0
CHEST TIGHTNESS: 0
SHORTNESS OF BREATH: 0
ABDOMINAL PAIN: 0
VOMITING: 0
COUGH: 0
RHINORRHEA: 0
BACK PAIN: 0

## 2020-11-07 ENCOUNTER — HOSPITAL ENCOUNTER (OUTPATIENT)
Age: 65
Discharge: HOME | End: 2020-11-07
Payer: COMMERCIAL

## 2020-11-07 LAB — PLATELET # BLD: 256 K/UL (ref 135–450)

## 2020-11-07 PROCEDURE — 86708 HEPATITIS A ANTIBODY: CPT

## 2020-11-07 PROCEDURE — 82977 ASSAY OF GGT: CPT

## 2020-11-07 PROCEDURE — 36415 COLL VENOUS BLD VENIPUNCTURE: CPT

## 2020-11-07 PROCEDURE — 84520 ASSAY OF UREA NITROGEN: CPT

## 2020-11-07 PROCEDURE — 84450 TRANSFERASE (AST) (SGOT): CPT

## 2020-11-07 PROCEDURE — 84460 ALANINE AMINO (ALT) (SGPT): CPT

## 2020-11-07 PROCEDURE — 83883 ASSAY NEPHELOMETRY NOT SPEC: CPT

## 2020-11-07 PROCEDURE — 85049 AUTOMATED PLATELET COUNT: CPT

## 2020-11-09 LAB — HAV AB SERPL IA-ACNC: POSITIVE

## 2020-11-11 LAB — MISCELLANEOUS LAB TEST ORDER: ABNORMAL

## 2021-01-31 PROBLEM — R03.0 ELEVATED BLOOD PRESSURE READING WITHOUT DIAGNOSIS OF HYPERTENSION: Status: ACTIVE | Noted: 2021-01-31

## 2021-01-31 NOTE — PROGRESS NOTES
Sierra Irby   76 y.o. male   1955    HPI:    Patient was last seen by me on 11/6/2020. During our last office visit:   -He was advised to follow up with GI for his chronic Hep C infection  -Meds were continued. No new changes or meds. Reason(s) for visit:   Chief Complaint   Patient presents with    3 Month Follow-Up    Arthritis    Hypertension     Patient stated that there has been no change in his health status since his last office visit with me. He stated that he is been actively busy working in an [employer]. He has had no injuries at work. He stated that he and his fellow coworkers are tested for COVID-19 at least once a week and some individuals more than once a week. Since his last office visit with me, he stated that about a week after he last saw me he had his first appointment with a gastroenterologist for evaluation of his chronic hepatitis C. He stated that he is on a 3-month treatment of a medication he cannot recall the name of. He stated that he will be completing treatment sometime by the end of this month. Regarding side effects, patient stated that he experienced severe loss of appetite that has persisted, but has overall gotten slightly better. He stated that he has to force himself to eat. He stated that he lost \"a few pounds\" at first, but based on his current weight check today, he lost 2 pounds. Otherwise, no other side effects have been encountered. PDMP report:  -PDMP report revealed no controlled medications written of any kind. Allergies   Allergen Reactions    Pcn [Penicillins]        Current Outpatient Medications on File Prior to Visit   Medication Sig Dispense Refill    omeprazole (PRILOSEC) 10 MG delayed release capsule Take 10 mg by mouth daily       No current facility-administered medications on file prior to visit.        Family History   Problem Relation Age of Onset    Breast Cancer Mother 61     Social History     Tobacco Use  Smoking status: Current Every Day Smoker     Packs/day: 0.50     Years: 54.00     Pack years: 27.00     Types: Cigarettes    Smokeless tobacco: Never Used   Substance Use Topics    Alcohol use: Not Currently     Comment: Pt reported that he stopped drinking 13 years ago. No results for input(s): WBC, HGB, HCT, MCV, PLT in the last 720 hours. Chemistry        Component Value Date/Time     05/24/2012 1055    K 4.2 05/24/2012 1055     05/24/2012 1055    CO2 30 05/24/2012 1055    BUN 7 05/24/2012 1055    CREATININE 0.9 05/24/2012 1055        Component Value Date/Time    CALCIUM 10.1 05/24/2012 1055    ALKPHOS 110 10/02/2020 1006    AST 36 10/02/2020 1006    ALT 33 10/02/2020 1006    BILITOT 0.5 10/02/2020 1006          Lab Results   Component Value Date    ALT 33 10/02/2020    AST 36 10/02/2020    ALKPHOS 110 10/02/2020    BILITOT 0.5 10/02/2020     Lab Results   Component Value Date    LABA1C 5.9 09/17/2020     Lab Results   Component Value Date    .6 09/17/2020       Review of Systems   Constitutional: Positive for appetite change. Negative for activity change, fatigue, fever and unexpected weight change. HENT: Negative for congestion, rhinorrhea, sinus pressure and trouble swallowing. Respiratory: Negative for cough, chest tightness, shortness of breath and wheezing. Cardiovascular: Negative for chest pain, palpitations and leg swelling. Gastrointestinal: Negative for abdominal distention, abdominal pain, blood in stool, constipation, diarrhea, nausea and vomiting. Genitourinary: Negative for dysuria, frequency and hematuria. Musculoskeletal: Negative for arthralgias and back pain. Skin: Negative for rash. Neurological: Negative for dizziness, weakness, light-headedness, numbness and headaches. Psychiatric/Behavioral: Negative for sleep disturbance.      Wt Readings from Last 3 Encounters:   02/05/21 154 lb 6.4 oz (70 kg)   11/06/20 156 lb (70.8 kg)   10/09/20 156 lb 6.4 oz (70.9 kg)       BP Readings from Last 3 Encounters:   02/05/21 (!) 145/62   11/06/20 138/78   10/09/20 (!) 149/82       Pulse Readings from Last 3 Encounters:   02/05/21 85   11/06/20 79   10/09/20 73       BP (!) 145/62   Pulse 85   Temp 98.2 °F (36.8 °C)   Wt 154 lb 6.4 oz (70 kg)   SpO2 97%   BMI 22.80 kg/m²      Physical Exam  Vitals signs reviewed. Constitutional:       General: He is awake. He is not in acute distress. Appearance: He is not ill-appearing or diaphoretic. HENT:      Head: Normocephalic and atraumatic. Right Ear: External ear normal.      Left Ear: External ear normal.      Nose: Nose normal.   Eyes:      Extraocular Movements: Extraocular movements intact. Conjunctiva/sclera: Conjunctivae normal.   Neck:      Musculoskeletal: Normal range of motion. No erythema. Cardiovascular:      Rate and Rhythm: Normal rate and regular rhythm. Pulmonary:      Effort: Pulmonary effort is normal. No respiratory distress. Breath sounds: No wheezing, rhonchi or rales. Abdominal:      General: Abdomen is flat. There is no distension. Palpations: Abdomen is soft. Musculoskeletal: Normal range of motion. General: No deformity. Right lower leg: No edema. Left lower leg: No edema. Skin:     Coloration: Skin is not cyanotic, jaundiced or pale. Findings: No abrasion, abscess, bruising, ecchymosis, erythema, signs of injury, laceration, lesion, petechiae, rash or wound. Neurological:      General: No focal deficit present. Mental Status: He is alert. Mental status is at baseline. Coordination: Coordination normal.   Psychiatric:         Attention and Perception: Attention and perception normal.         Mood and Affect: Mood and affect normal.         Speech: Speech normal.         Behavior: Behavior normal. Behavior is cooperative. Thought Content:  Thought content normal.       Assessment/Plan:   Aristeo was seen today for be a true 'drug allergy'. The most common adverse effects of medication(s) were addressed at today's visit. Lastly, the patient was informed that the coverage status of a medication may vary from insurance to insurance and the only way to verify if the medication is covered is to send an actual prescription in. The patient was also informed that the cost of medications vary from insurance to insurance. I reviewed the plan of care with the patient. Patient acknowledged understanding and agreed with plan of care overall. Estimated length of time of today's visit: 20 minutes    PDMP Monitoring:   Last PDMP [facility] as Reviewed [facility]):  Review User Review Instant Review Result   NILAY NORRIS 1/31/2021  5:04 PM Reviewed PDMP [1]     Follow-up: Return in about 4 weeks (around 3/5/2021) for HTN; 2nd dose Hep B.. Patient was informed that if his or her symptoms worsen to return here or go to nearest ER if symptoms significantly worsen. Regarding my note: This note was composed to the best of my knowledge and recollection of the encounter with the patient using one of my own customized note templates utilizing a combination of typing and dictating with the [address] [place] [address] speech recognition software. As a result, the note may possibly have various errors (e.g. spelling, grammar, and non-sensible words/phrases/statements) despite reviewing the note prior to signing it for completion. It is worth noting that most of the time, notes are completed usually long after the patient is seen and are strived to be completed in a timely fashion (ideally within 72 hours of the patient encounter). It is also worth noting that healthcare providers often see several patients a day (e.g. > 15-30 patients/day) in either the outpatient and/or inpatient setting(s).   In addition, healthcare providers spend a significant amount of time reviewing multiple patients' charts in preparation for their future encounters (pre-charting) as well as time spent reviewing any labs, imaging, specialist's notes (if relevant), and other documents (e.g. recent ER visits or hospital stays or completing forms such as FMLA, short-term/long-term disability), etc.  Time and effort is also allocated to coordinating with office staff to make sure various things are completed as part of the day-to-day office management responsibilities. Given all this, it is worth pointing out that not every detail can be remembered and not everything discussed is considered relevant, significant, or noteworthy. If one has any questions or concerns about my given note, please take into consideration all these aforementioned things before contacting. Daren Agrawal M.D.   [address]    Electronically signed by Dalila Connor on 2/5/2021 at 9:38 AM.

## 2021-02-05 ENCOUNTER — OFFICE VISIT (OUTPATIENT)
Dept: FAMILY MEDICINE CLINIC | Age: 66
End: 2021-02-05
Payer: COMMERCIAL

## 2021-02-05 VITALS
TEMPERATURE: 98.2 F | BODY MASS INDEX: 22.8 KG/M2 | SYSTOLIC BLOOD PRESSURE: 145 MMHG | DIASTOLIC BLOOD PRESSURE: 62 MMHG | HEART RATE: 85 BPM | OXYGEN SATURATION: 97 % | WEIGHT: 154.4 LBS

## 2021-02-05 DIAGNOSIS — F19.11 HISTORY OF DRUG ABUSE IN REMISSION (HCC): ICD-10-CM

## 2021-02-05 DIAGNOSIS — Z01.84 LACK OF IMMUNITY TO HEPATITIS B VIRUS DEMONSTRATED BY SEROLOGIC TEST: ICD-10-CM

## 2021-02-05 DIAGNOSIS — Z23 IMMUNIZATION DUE: ICD-10-CM

## 2021-02-05 DIAGNOSIS — B18.2 CHRONIC HEPATITIS C VIRUS GENOTYPE 1 INFECTION (HCC): ICD-10-CM

## 2021-02-05 DIAGNOSIS — I10 ESSENTIAL HYPERTENSION: Primary | ICD-10-CM

## 2021-02-05 DIAGNOSIS — K21.9 GASTROESOPHAGEAL REFLUX DISEASE, UNSPECIFIED WHETHER ESOPHAGITIS PRESENT: ICD-10-CM

## 2021-02-05 PROCEDURE — 90471 IMMUNIZATION ADMIN: CPT | Performed by: FAMILY MEDICINE

## 2021-02-05 PROCEDURE — 90746 HEPB VACCINE 3 DOSE ADULT IM: CPT | Performed by: FAMILY MEDICINE

## 2021-02-05 PROCEDURE — 99213 OFFICE O/P EST LOW 20 MIN: CPT | Performed by: FAMILY MEDICINE

## 2021-02-05 RX ORDER — INDAPAMIDE 1.25 MG/1
1.25 TABLET, FILM COATED ORAL EVERY MORNING
Qty: 90 TABLET | Refills: 0 | Status: SHIPPED | OUTPATIENT
Start: 2021-02-06 | End: 2021-11-01

## 2021-02-05 ASSESSMENT — ENCOUNTER SYMPTOMS
SHORTNESS OF BREATH: 0
BLOOD IN STOOL: 0
CONSTIPATION: 0
RHINORRHEA: 0
NAUSEA: 0
COUGH: 0
DIARRHEA: 0
ABDOMINAL PAIN: 0
CHEST TIGHTNESS: 0
BACK PAIN: 0
WHEEZING: 0
TROUBLE SWALLOWING: 0
SINUS PRESSURE: 0
ABDOMINAL DISTENTION: 0
VOMITING: 0

## 2021-02-05 ASSESSMENT — PATIENT HEALTH QUESTIONNAIRE - PHQ9
SUM OF ALL RESPONSES TO PHQ QUESTIONS 1-9: 0

## 2021-03-16 ENCOUNTER — NURSE ONLY (OUTPATIENT)
Dept: FAMILY MEDICINE CLINIC | Age: 66
End: 2021-03-16
Payer: COMMERCIAL

## 2021-03-16 VITALS — TEMPERATURE: 97.2 F

## 2021-03-16 DIAGNOSIS — Z23 IMMUNIZATION DUE: Primary | ICD-10-CM

## 2021-03-16 PROCEDURE — 90746 HEPB VACCINE 3 DOSE ADULT IM: CPT | Performed by: FAMILY MEDICINE

## 2021-03-16 PROCEDURE — 90471 IMMUNIZATION ADMIN: CPT | Performed by: FAMILY MEDICINE

## 2021-04-29 NOTE — PROGRESS NOTES
[place]   78 y.o. male   1955    HPI:    Patient was last seen by me on 2/5/2021.    -Prescribed patient Indapamide 1.25 mg for HTN. Reason(s) for visit:   Chief Complaint   Patient presents with    Hypertension     3 month follow up       Patient has hx of Hepatitis C. Last saw GI doctor some time in March. He has not had repeat lab work done. No one has contacted him from GI office to set up appointment. No issues with jaundice, weight loss, appetite, spontaneous bruising, abdominal distention, discoloration of stool, etc.    Patient has not been taking Indapamide. He stated his BP issues related to working double shifts and consuming energy drink(s) (usually one a day). No headaches, visual disturbances, chest pain, palpitations, etc.    Patient stated he has had cramping of BLE. He has hx of IV drug use and has injected himself in his medial quadriceps area. Reported of claudication, especially given his work at [employer] Wakes up with leg cramps. Denied cold intolerance. Stated he's well hydrated at work with water. Allergies   Allergen Reactions    Pcn [Penicillins]        Current Outpatient Medications on File Prior to Visit   Medication Sig Dispense Refill    indapamide (LOZOL) 1.25 MG tablet Take 1 tablet by mouth every morning 90 tablet 0    omeprazole (PRILOSEC) 10 MG delayed release capsule Take 10 mg by mouth daily       No current facility-administered medications on file prior to visit. Family History   Problem Relation Age of Onset   [facility] Breast Cancer Mother 61       Social History     Tobacco Use    Smoking status: Current Every Day Smoker     Packs/day: 0.50     Years: 54.00     Pack years: 27.00     Types: Cigarettes    Smokeless tobacco: Never Used   Substance Use Topics    Alcohol use: Not Currently     Comment: Pt reported that he stopped drinking 13 years ago.         Lab Results   Component Value Date    WBC 11.4 (H) 08/21/2020    HGB 13.3 (L) 08/21/2020    HCT 39.8 (L) 08/21/2020    MCV 83.0 08/21/2020     11/07/2020       Chemistry        Component Value Date/Time     05/24/2012 1055    K 4.2 05/24/2012 1055     05/24/2012 1055    CO2 30 05/24/2012 1055    BUN 7 05/24/2012 1055    CREATININE 0.9 05/24/2012 1055        Component Value Date/Time    CALCIUM 10.1 05/24/2012 1055    ALKPHOS 110 10/02/2020 1006    AST 36 10/02/2020 1006    ALT 33 10/02/2020 1006    BILITOT 0.5 10/02/2020 1006          Lab Results   Component Value Date    ALT 33 10/02/2020    AST 36 10/02/2020    ALKPHOS 110 10/02/2020    BILITOT 0.5 10/02/2020     Lab Results   Component Value Date    LABA1C 5.9 09/17/2020     Lab Results   Component Value Date    .6 09/17/2020       Review of Systems   Constitutional: Negative for activity change, appetite change, fatigue, fever and unexpected weight change. HENT: Negative for congestion, rhinorrhea, sinus pressure and trouble swallowing. Respiratory: Negative for cough, chest tightness, shortness of breath and wheezing. Cardiovascular: Negative for chest pain, palpitations and leg swelling. Gastrointestinal: Negative for abdominal distention, abdominal pain, blood in stool, constipation, diarrhea, nausea and vomiting. Genitourinary: Negative for dysuria, frequency and hematuria. Musculoskeletal: Positive for arthralgias and myalgias. Negative for back pain. Skin: Negative for rash. Neurological: Negative for dizziness, weakness, light-headedness, numbness and headaches. Psychiatric/Behavioral: Negative for sleep disturbance. The patient is not nervous/anxious.          Wt Readings from Last 3 Encounters:   05/03/21 154 lb (69.9 kg)   02/05/21 154 lb 6.4 oz (70 kg)   11/06/20 156 lb (70.8 kg)       BP Readings from Last 3 Encounters:   05/03/21 132/78   02/05/21 (!) 145/62   11/06/20 138/78       Pulse Readings from Last 3 Encounters:   05/03/21 69   02/05/21 85   11/06/20 79       /78 Pulse 69   Temp 98 °F (36.7 °C)   Ht 5' 9\" (1.753 m)   Wt 154 lb (69.9 kg)   BMI 22.74 kg/m²      Physical Exam  Vitals signs reviewed. Constitutional:       General: He is awake. He is not in acute distress. Appearance: He is not ill-appearing or diaphoretic. HENT:      Head: Normocephalic and atraumatic. Right Ear: External ear normal.      Left Ear: External ear normal.      Nose: Nose normal.   Eyes:      General: Lids are normal. Gaze aligned appropriately. No scleral icterus. Right eye: No discharge. Left eye: No discharge. Extraocular Movements: Extraocular movements intact. Conjunctiva/sclera: Conjunctivae normal.   Neck:      Musculoskeletal: Normal range of motion. No erythema. Trachea: Phonation normal.   Cardiovascular:      Rate and Rhythm: Normal rate and regular rhythm. Pulmonary:      Effort: Pulmonary effort is normal. No respiratory distress. Breath sounds: No wheezing, rhonchi or rales. Abdominal:      General: Abdomen is flat. There is no distension. Palpations: Abdomen is soft. Musculoskeletal: Normal range of motion. General: No deformity. Right lower leg: No edema. Left lower leg: No edema. Skin:     Coloration: Skin is not cyanotic, jaundiced or pale. Findings: No abrasion, abscess, bruising, ecchymosis, erythema, signs of injury, laceration, lesion, petechiae, rash or wound. Neurological:      General: No focal deficit present. Mental Status: He is alert. Mental status is at baseline. GCS: GCS eye subscore is 4. GCS verbal subscore is 5. GCS motor subscore is 6. Coordination: Coordination normal.      Gait: Gait is intact. Psychiatric:         Attention and Perception: Attention and perception normal.         Mood and Affect: Mood and affect normal.         Speech: Speech normal.         Behavior: Behavior normal. Behavior is cooperative. Thought Content:  Thought content normal. increasing the dose of a current medication that either the medication can be totally discontinued altogether or simply decrease the dose of it and if this would be the case a follow-up appointment would be deemed necessary.    -The drug allergy list will then be updated with the corresponding side effect(s) if it's deemed to be a true 'drug allergy'. -The most common adverse effects of medication(s) were addressed at today's visit.    -Lastly, the coverage status of a medication may vary from insurance to insurance and the only way to verify if the medication is covered is to send an actual prescription in.    -The drug formulary of each insurance changes without any warning or notification to the healthcare provider let alone the pharmacy.  -The cost of medications vary from insurance to insurance and the cost is always subject to change just like the drug formulary. Estimated length of time of today's visit: 30 minutes    Follow-up: Return in about 6 months (around 11/3/2021) for IP - check BP. Paty Cueva Patient was informed that if his or her symptoms worsen to follow up with me sooner or go to the nearest ER if the symptoms are very significant and warrant higher level of care. Regarding my note: This note was composed (by me only and not with assistance via a scribe) to the best of my knowledge and recollection of the encounter with the patient using one of my own customized note templates utilizing a combination of typing and dictating with the [address] [place] [address] speech recognition software. As a result, the note may possibly have various errors (e.g. spelling, grammar, and non-sensible words/phrases/statements) despite reviewing the note prior to signing it for completion. It is worth noting that most of the time, progress notes are completed usually long after the patient was seen.   Every effort is made to have notes as well as other things that needed to be attended to (e.g. medication refills, MyChart messages, documents that require reviewing, etc.) be addressed and completed in a timely fashion (ideally within 72 hours of the patient encounter). It is also worth noting that healthcare providers often see several patients a day (e.g. > 15-30 patients/day) in either the outpatient and/or inpatient setting(s). In addition, healthcare providers spend a significant amount of time reviewing multiple patients' charts in preparation for their future encounters (pre-charting) as well as time spent reviewing any labs, imaging, specialist's notes (if relevant), and other documents (e.g. recent ER visits or hospital stays or completing forms such as FMLA, short-term/long-term disability), etc.  Time is also allocated to attending to patient's messages on [facility], phone calls from various people, attending to prescription refills/orders, and also attending meetings or conferences throughout the day. Time and effort is also allocated to coordinating with office staff to make sure various things are completed as part of the day-to-day office management responsibilities. For the most part, substantial portion of each given day is usually spent with direct patient care followed by documenting. Given all this, it is worth pointing out that not every detail of the encounter can be remembered and not everything discussed is considered relevant, significant, or noteworthy. It is also worth mentioning that my recollection of the visit may differ from the respective patient's recollection of the visit. This note is primarily written for myself (the healthcare provider) utilizing one of my own customized templates so I can recall what happened during that visit and may be used for future reference for myself to prepare for follow up appointments.   My note is also written in mind for other healthcare professionals (who have their own extensive medical background and experience) for their own

## 2021-05-03 ENCOUNTER — OFFICE VISIT (OUTPATIENT)
Dept: FAMILY MEDICINE CLINIC | Age: 66
End: 2021-05-03
Payer: COMMERCIAL

## 2021-05-03 VITALS
HEIGHT: 69 IN | BODY MASS INDEX: 22.81 KG/M2 | TEMPERATURE: 98 F | DIASTOLIC BLOOD PRESSURE: 78 MMHG | SYSTOLIC BLOOD PRESSURE: 132 MMHG | HEART RATE: 69 BPM | WEIGHT: 154 LBS

## 2021-05-03 DIAGNOSIS — R03.0 ELEVATED BLOOD PRESSURE READING WITHOUT DIAGNOSIS OF HYPERTENSION: ICD-10-CM

## 2021-05-03 DIAGNOSIS — R73.03 PREDIABETES: ICD-10-CM

## 2021-05-03 DIAGNOSIS — I83.93 VARICOSE VEINS OF BOTH LOWER EXTREMITIES, UNSPECIFIED WHETHER COMPLICATED: ICD-10-CM

## 2021-05-03 DIAGNOSIS — F17.200 CURRENT SMOKER: ICD-10-CM

## 2021-05-03 DIAGNOSIS — B18.2 CHRONIC HEPATITIS C VIRUS GENOTYPE 1 INFECTION (HCC): ICD-10-CM

## 2021-05-03 DIAGNOSIS — Z13.220 LIPID SCREENING: ICD-10-CM

## 2021-05-03 DIAGNOSIS — I73.9 BILATERAL CLAUDICATION OF LOWER LIMB (HCC): ICD-10-CM

## 2021-05-03 DIAGNOSIS — Z00.00 ENCOUNTER FOR PREVENTATIVE ADULT HEALTH CARE EXAMINATION: Primary | ICD-10-CM

## 2021-05-03 PROCEDURE — 99406 BEHAV CHNG SMOKING 3-10 MIN: CPT | Performed by: FAMILY MEDICINE

## 2021-05-03 PROCEDURE — 99214 OFFICE O/P EST MOD 30 MIN: CPT | Performed by: FAMILY MEDICINE

## 2021-05-03 ASSESSMENT — ENCOUNTER SYMPTOMS
VOMITING: 0
BACK PAIN: 0
CHEST TIGHTNESS: 0
ABDOMINAL PAIN: 0
RHINORRHEA: 0
CONSTIPATION: 0
DIARRHEA: 0
BLOOD IN STOOL: 0
NAUSEA: 0
ABDOMINAL DISTENTION: 0
TROUBLE SWALLOWING: 0
SHORTNESS OF BREATH: 0
COUGH: 0
SINUS PRESSURE: 0
WHEEZING: 0

## 2021-05-04 ENCOUNTER — HOSPITAL ENCOUNTER (OUTPATIENT)
Age: 66
Discharge: HOME | End: 2021-05-04
Payer: COMMERCIAL

## 2021-05-04 DIAGNOSIS — I73.9 BILATERAL CLAUDICATION OF LOWER LIMB (HCC): ICD-10-CM

## 2021-05-04 DIAGNOSIS — R03.0 ELEVATED BLOOD PRESSURE READING WITHOUT DIAGNOSIS OF HYPERTENSION: ICD-10-CM

## 2021-05-04 DIAGNOSIS — R73.03 PREDIABETES: ICD-10-CM

## 2021-05-04 DIAGNOSIS — B18.2 CHRONIC HEPATITIS C VIRUS GENOTYPE 1 INFECTION (HCC): ICD-10-CM

## 2021-05-04 LAB
ALBUMIN SERPL-MCNC: 4.4 G/DL (ref 3.4–5)
ALP BLD-CCNC: 94 U/L (ref 40–129)
ALT SERPL-CCNC: 14 U/L (ref 10–40)
ANION GAP SERPL CALCULATED.3IONS-SCNC: 11 MMOL/L (ref 3–16)
AST SERPL-CCNC: 27 U/L (ref 15–37)
BASOPHILS ABSOLUTE: 0.1 K/UL (ref 0–0.2)
BASOPHILS RELATIVE PERCENT: 1 %
BILIRUB SERPL-MCNC: 0.4 MG/DL (ref 0–1)
BILIRUBIN DIRECT: <0.2 MG/DL (ref 0–0.3)
BILIRUBIN, INDIRECT: NORMAL MG/DL (ref 0–1)
BUN BLDV-MCNC: 12 MG/DL (ref 7–20)
C-REACTIVE PROTEIN: <3 MG/L (ref 0–5.1)
CALCIUM SERPL-MCNC: 9.5 MG/DL (ref 8.3–10.6)
CHLORIDE BLD-SCNC: 102 MMOL/L (ref 99–110)
CO2: 26 MMOL/L (ref 21–32)
CREAT SERPL-MCNC: 0.9 MG/DL (ref 0.8–1.3)
EOSINOPHILS ABSOLUTE: 0.4 K/UL (ref 0–0.6)
EOSINOPHILS RELATIVE PERCENT: 3.9 %
GFR AFRICAN AMERICAN: >60
GFR NON-AFRICAN AMERICAN: >60
GLUCOSE BLD-MCNC: 80 MG/DL (ref 70–99)
HCT VFR BLD CALC: 36.1 % (ref 40.5–52.5)
HEMOGLOBIN: 12.3 G/DL (ref 13.5–17.5)
LYMPHOCYTES ABSOLUTE: 2.8 K/UL (ref 1–5.1)
LYMPHOCYTES RELATIVE PERCENT: 28.1 %
MAGNESIUM: 2 MG/DL (ref 1.8–2.4)
MCH RBC QN AUTO: 28.6 PG (ref 26–34)
MCHC RBC AUTO-ENTMCNC: 34 G/DL (ref 31–36)
MCV RBC AUTO: 84.2 FL (ref 80–100)
MONOCYTES ABSOLUTE: 0.7 K/UL (ref 0–1.3)
MONOCYTES RELATIVE PERCENT: 6.7 %
NEUTROPHILS ABSOLUTE: 6.1 K/UL (ref 1.7–7.7)
NEUTROPHILS RELATIVE PERCENT: 60.3 %
PDW BLD-RTO: 14 % (ref 12.4–15.4)
PHOSPHORUS: 3.4 MG/DL (ref 2.5–4.9)
PLATELET # BLD: 235 K/UL (ref 135–450)
PMV BLD AUTO: 9.6 FL (ref 5–10.5)
POTASSIUM SERPL-SCNC: 4.4 MMOL/L (ref 3.5–5.1)
RBC # BLD: 4.28 M/UL (ref 4.2–5.9)
SEDIMENTATION RATE, ERYTHROCYTE: 28 MM/HR (ref 0–20)
SODIUM BLD-SCNC: 139 MMOL/L (ref 136–145)
T4 FREE: 1.2 NG/DL (ref 0.9–1.8)
TOTAL CK: 296 U/L (ref 39–308)
TOTAL PROTEIN: 7.9 G/DL (ref 6.4–8.2)
TSH SERPL DL<=0.05 MIU/L-ACNC: 1.33 UIU/ML (ref 0.27–4.2)
VITAMIN D 25-HYDROXY: 38.6 NG/ML
WBC # BLD: 10.1 K/UL (ref 4–11)

## 2021-05-04 PROCEDURE — 84439 ASSAY OF FREE THYROXINE: CPT

## 2021-05-04 PROCEDURE — 36415 COLL VENOUS BLD VENIPUNCTURE: CPT

## 2021-05-04 PROCEDURE — 83036 HEMOGLOBIN GLYCOSYLATED A1C: CPT

## 2021-05-04 PROCEDURE — 86140 C-REACTIVE PROTEIN: CPT

## 2021-05-04 PROCEDURE — 85652 RBC SED RATE AUTOMATED: CPT

## 2021-05-04 PROCEDURE — 87522 HEPATITIS C REVRS TRNSCRPJ: CPT

## 2021-05-04 PROCEDURE — 83735 ASSAY OF MAGNESIUM: CPT

## 2021-05-04 PROCEDURE — 80069 RENAL FUNCTION PANEL: CPT

## 2021-05-04 PROCEDURE — 82306 VITAMIN D 25 HYDROXY: CPT

## 2021-05-04 PROCEDURE — 85025 COMPLETE CBC W/AUTO DIFF WBC: CPT

## 2021-05-04 PROCEDURE — 84443 ASSAY THYROID STIM HORMONE: CPT

## 2021-05-04 PROCEDURE — 82550 ASSAY OF CK (CPK): CPT

## 2021-05-04 PROCEDURE — 80076 HEPATIC FUNCTION PANEL: CPT

## 2021-05-05 ENCOUNTER — HOSPITAL ENCOUNTER (OUTPATIENT)
Age: 66
Discharge: HOME | End: 2021-05-05
Payer: COMMERCIAL

## 2021-05-05 DIAGNOSIS — Z13.220 LIPID SCREENING: ICD-10-CM

## 2021-05-05 LAB
CHOLESTEROL, TOTAL: 145 MG/DL (ref 0–199)
ESTIMATED AVERAGE GLUCOSE: 108.3 MG/DL
HBA1C MFR BLD: 5.4 %
HDLC SERPL-MCNC: 52 MG/DL (ref 40–60)
LDL CHOLESTEROL CALCULATED: 83 MG/DL
TRIGL SERPL-MCNC: 50 MG/DL (ref 0–150)
VLDLC SERPL CALC-MCNC: 10 MG/DL

## 2021-05-05 PROCEDURE — 36415 COLL VENOUS BLD VENIPUNCTURE: CPT

## 2021-05-05 PROCEDURE — 80061 LIPID PANEL: CPT

## 2021-05-06 LAB
HCV QNT BY NAAT IU/ML: NOT DETECTED IU/ML
HCV QNT BY NAAT LOG IU/ML: NOT DETECTED LOG IU/ML
INTERPRETATION: NOT DETECTED

## 2021-05-25 ENCOUNTER — HOSPITAL ENCOUNTER (OUTPATIENT)
Age: 66
Discharge: HOME | End: 2021-05-25
Payer: COMMERCIAL

## 2021-05-25 LAB
A/G RATIO: 1.2 (ref 1.1–2.2)
ALBUMIN SERPL-MCNC: 4.4 G/DL (ref 3.4–5)
ALP BLD-CCNC: 102 U/L (ref 40–129)
ALT SERPL-CCNC: 13 U/L (ref 10–40)
ANION GAP SERPL CALCULATED.3IONS-SCNC: 11 MMOL/L (ref 3–16)
AST SERPL-CCNC: 26 U/L (ref 15–37)
BASOPHILS ABSOLUTE: 0 K/UL (ref 0–0.2)
BASOPHILS RELATIVE PERCENT: 0.1 %
BILIRUB SERPL-MCNC: 0.4 MG/DL (ref 0–1)
BUN BLDV-MCNC: 12 MG/DL (ref 7–20)
CALCIUM SERPL-MCNC: 9.5 MG/DL (ref 8.3–10.6)
CHLORIDE BLD-SCNC: 102 MMOL/L (ref 99–110)
CO2: 25 MMOL/L (ref 21–32)
CREAT SERPL-MCNC: 0.9 MG/DL (ref 0.8–1.3)
EOSINOPHILS ABSOLUTE: 0.4 K/UL (ref 0–0.6)
EOSINOPHILS RELATIVE PERCENT: 4 %
GFR AFRICAN AMERICAN: >60
GFR NON-AFRICAN AMERICAN: >60
GLOBULIN: 3.6 G/DL
GLUCOSE BLD-MCNC: 82 MG/DL (ref 70–99)
HCT VFR BLD CALC: 36.3 % (ref 40.5–52.5)
HEMOGLOBIN: 12.3 G/DL (ref 13.5–17.5)
LYMPHOCYTES ABSOLUTE: 3 K/UL (ref 1–5.1)
LYMPHOCYTES RELATIVE PERCENT: 31 %
MCH RBC QN AUTO: 28.5 PG (ref 26–34)
MCHC RBC AUTO-ENTMCNC: 33.8 G/DL (ref 31–36)
MCV RBC AUTO: 84.3 FL (ref 80–100)
MONOCYTES ABSOLUTE: 0.7 K/UL (ref 0–1.3)
MONOCYTES RELATIVE PERCENT: 7.4 %
NEUTROPHILS ABSOLUTE: 5.7 K/UL (ref 1.7–7.7)
NEUTROPHILS RELATIVE PERCENT: 57.5 %
PDW BLD-RTO: 13.5 % (ref 12.4–15.4)
PLATELET # BLD: 246 K/UL (ref 135–450)
PMV BLD AUTO: 9.6 FL (ref 5–10.5)
POTASSIUM SERPL-SCNC: 4.3 MMOL/L (ref 3.5–5.1)
RBC # BLD: 4.31 M/UL (ref 4.2–5.9)
SODIUM BLD-SCNC: 138 MMOL/L (ref 136–145)
TOTAL PROTEIN: 8 G/DL (ref 6.4–8.2)
WBC # BLD: 9.8 K/UL (ref 4–11)

## 2021-05-25 PROCEDURE — 85025 COMPLETE CBC W/AUTO DIFF WBC: CPT

## 2021-05-25 PROCEDURE — 80053 COMPREHEN METABOLIC PANEL: CPT

## 2021-05-25 PROCEDURE — 87522 HEPATITIS C REVRS TRNSCRPJ: CPT

## 2021-05-25 PROCEDURE — 36415 COLL VENOUS BLD VENIPUNCTURE: CPT

## 2021-06-11 ENCOUNTER — IMMUNIZATION (OUTPATIENT)
Dept: PRIMARY CARE CLINIC | Age: 66
End: 2021-06-11
Payer: COMMERCIAL

## 2021-06-11 PROCEDURE — 0001A COVID-19, PFIZER VACCINE 30MCG/0.3ML DOSE: CPT | Performed by: FAMILY MEDICINE

## 2021-06-11 PROCEDURE — 91300 COVID-19, PFIZER VACCINE 30MCG/0.3ML DOSE: CPT | Performed by: FAMILY MEDICINE

## 2021-06-16 NOTE — PROGRESS NOTES
medications on file prior to visit. Family History   Problem Relation Age of Onset   [facility] Breast Cancer Mother 61       Social History     Tobacco Use    Smoking status: Current Every Day Smoker     Packs/day: 0.50     Years: 54.00     Pack years: 27.00     Types: Cigarettes    Smokeless tobacco: Never Used   Substance Use Topics    Alcohol use: Not Currently     Comment: Pt reported that he stopped drinking 13 years ago. Lab Results   Component Value Date    WBC 9.8 05/25/2021    HGB 12.3 (L) 05/25/2021    HCT 36.3 (L) 05/25/2021    MCV 84.3 05/25/2021     05/25/2021       Chemistry        Component Value Date/Time     05/25/2021 1634    K 4.3 05/25/2021 1634     05/25/2021 1634    CO2 25 05/25/2021 1634    BUN 12 05/25/2021 1634    CREATININE 0.9 05/25/2021 1634        Component Value Date/Time    CALCIUM 9.5 05/25/2021 1634    ALKPHOS 102 05/25/2021 1634    AST 26 05/25/2021 1634    ALT 13 05/25/2021 1634    BILITOT 0.4 05/25/2021 1634          Lab Results   Component Value Date    ALT 13 05/25/2021    AST 26 05/25/2021    ALKPHOS 102 05/25/2021    BILITOT 0.4 05/25/2021     Lab Results   Component Value Date    LABA1C 5.4 05/04/2021     Lab Results   Component Value Date    .3 05/04/2021       Review of Systems   Constitutional: Negative for activity change, appetite change, fatigue, fever and unexpected weight change. HENT: Negative for congestion, rhinorrhea, sinus pressure and trouble swallowing. Respiratory: Negative for cough, chest tightness, shortness of breath and wheezing. Cardiovascular: Negative for chest pain, palpitations and leg swelling. Gastrointestinal: Negative for abdominal distention, abdominal pain, blood in stool, constipation, diarrhea, nausea and vomiting. Genitourinary: Negative for dysuria, frequency and hematuria. Musculoskeletal: Negative for arthralgias and back pain. Skin: Negative for rash.    Neurological: Negative for dizziness, weakness, light-headedness, numbness and headaches. Wt Readings from Last 3 Encounters:   06/18/21 147 lb 12.8 oz (67 kg)   05/03/21 154 lb (69.9 kg)   02/05/21 154 lb 6.4 oz (70 kg)       BP Readings from Last 3 Encounters:   06/18/21 124/82   05/03/21 132/78   02/05/21 (!) 145/62       Pulse Readings from Last 3 Encounters:   06/18/21 69   05/03/21 69   02/05/21 85       /82   Pulse 69   Temp 97.2 °F (36.2 °C)   Wt 147 lb 12.8 oz (67 kg)   SpO2 98%   BMI 21.83 kg/m²      Physical Exam  Vitals reviewed. Constitutional:       General: He is awake. He is not in acute distress. Appearance: He is not ill-appearing or diaphoretic. HENT:      Head: Normocephalic and atraumatic. Right Ear: External ear normal.      Left Ear: External ear normal.      Nose: Nose normal.   Eyes:      General: Lids are normal. Gaze aligned appropriately. No scleral icterus. Right eye: No discharge. Left eye: No discharge. Extraocular Movements: Extraocular movements intact. Conjunctiva/sclera: Conjunctivae normal.   Neck:      Trachea: Phonation normal.   Cardiovascular:      Rate and Rhythm: Normal rate and regular rhythm. Pulmonary:      Effort: Pulmonary effort is normal. No respiratory distress. Breath sounds: No wheezing, rhonchi or rales. Abdominal:      General: Abdomen is flat. There is no distension. Palpations: Abdomen is soft. Musculoskeletal:         General: No deformity. Normal range of motion. Cervical back: Normal range of motion. No erythema. Right lower leg: No edema. Left lower leg: No edema. Skin:     Coloration: Skin is not cyanotic, jaundiced or pale. Findings: No abrasion, abscess, bruising, ecchymosis, erythema, signs of injury, laceration, lesion, petechiae, rash or wound. Neurological:      General: No focal deficit present. Mental Status: He is alert. Mental status is at baseline.       GCS: GCS eye subscore is 4. GCS verbal subscore is 5. GCS motor subscore is 6. Coordination: Coordination normal.      Gait: Gait is intact. Psychiatric:         Attention and Perception: Attention and perception normal.         Mood and Affect: Mood and affect normal.         Speech: Speech normal.         Behavior: Behavior normal. Behavior is cooperative. Thought Content: Thought content normal.       Assessment/Plan:   Gudelia Healy was seen today for follow-up and discuss labs. Diagnoses and all orders for this visit:    Encounter for completion of form with patient  Comments:  Reviewed form with patient. Will complete form and fax asap. [place]    Essential hypertension  Comments:  BP < 130/80. Controlled. Stable. Continue current med regiment. Low salt diet. Other osteoarthritis of spine, lumbar region      I reviewed the plan of care with the patient. Patient acknowledged understanding and agreed with plan of care overall. There are no discontinued medications. General information on medications:  -When it comes to medications, whether with starting or adding a new medication or increasing the dose of a current medication, the benefits and risks have to always be considered and weighed over, especially if one is taking other medications as well. -There are no medications that have no side effects and that there is always a risk involved with taking a medication.    -If a side effect were to occur with starting a new medication or with increasing the dose of a current medication that either the medication can be totally discontinued altogether or simply decrease the dose of it and if this would be the case a follow-up appointment would be deemed necessary.    -The drug allergy list will then be updated with the corresponding side effect(s) if it's deemed to be a true 'drug allergy'.     -The most common adverse effects of medication(s) were addressed at today's visit.    -Lastly, the coverage status of a medication may vary from insurance to insurance and the only way to verify if the medication is covered is to send an actual prescription in.    -The drug formulary of each insurance changes without any warning or notification to the healthcare provider let alone the pharmacy.  -The cost of medications vary from insurance to insurance and the cost is always subject to change just like the drug formulary. Estimated length of time of today's visit: 30 minutes    Follow-up: Return if symptoms worsen or fail to improve. .     Patient was informed that if his or her symptoms worsen to follow up with me sooner or go to the nearest ER if the symptoms are very significant and warrant higher level of care. Regarding my note: This note was composed (by me only and not with assistance via a scribe) to the best of my knowledge and recollection of the encounter with the patient using one of my own customized note templates utilizing a combination of typing and dictating with the [address] [place] [address] speech recognition software. As a result, the note may possibly have various errors (e.g. spelling, grammar, and non-sensible words/phrases/statements) despite reviewing the note prior to signing it for completion. It is worth noting that most of the time, progress notes are completed usually long after the patient was seen. Every effort is made to have notes as well as other things that needed to be attended to (e.g. medication refills, MyChart messages, documents that require reviewing, etc.) be addressed and completed in a timely fashion (ideally within 72 hours of the patient encounter). It is also worth noting that healthcare providers often see several patients a day (e.g. > 15-30 patients/day) in either the outpatient and/or inpatient setting(s).   In addition, healthcare providers spend a significant amount of time reviewing multiple patients' charts in preparation for their future encounters (pre-charting) as well as time spent reviewing any labs, imaging, specialist's notes (if relevant), and other documents (e.g. recent ER visits or hospital stays or completing forms such as FMLA, short-term/long-term disability), etc.  Time is also allocated to attending to patient's messages on [employer], phone calls from various people, attending to prescription refills/orders, and also attending meetings or conferences throughout the day. Time and effort is also allocated to coordinating with office staff to make sure various things are completed as part of the day-to-day office management responsibilities. For the most part, substantial portion of each given day is usually spent with direct patient care followed by documenting. Given all this, it is worth pointing out that not every detail of the encounter can be remembered and not everything discussed is considered relevant, significant, or noteworthy. It is also worth mentioning that my recollection of the visit may differ from the respective patient's recollection of the visit. This note is primarily written for myself (the healthcare provider) utilizing one of my own customized templates so I can recall what happened during that visit and may be used for future reference for myself to prepare for follow up appointments. My note is also written in mind for other healthcare professionals (who have their own extensive medical background and experience) for their own understanding (of what happened during the visit) and also more importantly to hopefully help influence and play a role in formulating their plan of care along with their own unique medical expertise. If one has any questions or concerns about my given note, please take into consideration all these aforementioned things before contacting. Daren Jaime M.D.   [address]    Electronically signed by Lulu Stoddard on 6/18/2021 at 3:13 PM.

## 2021-06-18 ENCOUNTER — OFFICE VISIT (OUTPATIENT)
Dept: FAMILY MEDICINE CLINIC | Age: 66
End: 2021-06-18
Payer: COMMERCIAL

## 2021-06-18 VITALS
TEMPERATURE: 97.2 F | SYSTOLIC BLOOD PRESSURE: 124 MMHG | WEIGHT: 147.8 LBS | DIASTOLIC BLOOD PRESSURE: 82 MMHG | OXYGEN SATURATION: 98 % | HEART RATE: 69 BPM | BODY MASS INDEX: 21.83 KG/M2

## 2021-06-18 DIAGNOSIS — I10 ESSENTIAL HYPERTENSION: ICD-10-CM

## 2021-06-18 DIAGNOSIS — M47.896 OTHER OSTEOARTHRITIS OF SPINE, LUMBAR REGION: ICD-10-CM

## 2021-06-18 DIAGNOSIS — F40.241 ACROPHOBIA: ICD-10-CM

## 2021-06-18 DIAGNOSIS — Z02.89 ENCOUNTER FOR COMPLETION OF FORM WITH PATIENT: Primary | ICD-10-CM

## 2021-06-18 PROCEDURE — 99213 OFFICE O/P EST LOW 20 MIN: CPT | Performed by: FAMILY MEDICINE

## 2021-06-18 ASSESSMENT — ENCOUNTER SYMPTOMS
DIARRHEA: 0
WHEEZING: 0
NAUSEA: 0
RHINORRHEA: 0
SHORTNESS OF BREATH: 0
ABDOMINAL DISTENTION: 0
CONSTIPATION: 0
CHEST TIGHTNESS: 0
TROUBLE SWALLOWING: 0
BLOOD IN STOOL: 0
COUGH: 0
VOMITING: 0
ABDOMINAL PAIN: 0
SINUS PRESSURE: 0
BACK PAIN: 0

## 2021-06-21 ENCOUNTER — TELEPHONE (OUTPATIENT)
Dept: FAMILY MEDICINE CLINIC | Age: 66
End: 2021-06-21

## 2021-06-21 NOTE — TELEPHONE ENCOUNTER
Called pt to advise that the paperwork for [facility] was faxed. Paperwork scanned into media. LM for him to call the office.

## 2021-07-01 ENCOUNTER — OFFICE VISIT (OUTPATIENT)
Dept: VASCULAR SURGERY | Age: 66
End: 2021-07-01
Payer: COMMERCIAL

## 2021-07-01 VITALS
HEART RATE: 78 BPM | BODY MASS INDEX: 22.22 KG/M2 | WEIGHT: 150 LBS | SYSTOLIC BLOOD PRESSURE: 129 MMHG | HEIGHT: 69 IN | DIASTOLIC BLOOD PRESSURE: 66 MMHG

## 2021-07-01 DIAGNOSIS — I83.893 SYMPTOMATIC VARICOSE VEINS OF BOTH LOWER EXTREMITIES: Primary | ICD-10-CM

## 2021-07-01 PROCEDURE — 99204 OFFICE O/P NEW MOD 45 MIN: CPT | Performed by: SURGERY

## 2021-07-01 NOTE — Clinical Note
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 He clearly is symptomatic and I have suggested that he begin wearing thigh-high compression stockings as well as follow-up after undergoing a venous reflux study to consider treatment options. I will keep appraised of our findings and plans for the future. If you have any questions please feel free to contact me. Thanks for asked me to see him and please let me know if I can help you with any other patients in the future.     France Shepherd

## 2021-07-01 NOTE — PROGRESS NOTES
Subjective:      Patient ID: Sharlene Montero is a 78 y.o. male. HPI Referral from Alondra Neumann MD for evaluation of large bilateral varicose veins associated with crampy discomfort and fatigue in the calfs as well as achiness that worsens by the end of the day. 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 No history of SVT, ulceration, rashes, edema or bleeding. Patient has noted increasing brown discoloration in his bilateral medial ankles over the last several months. No previous venous interventions and no recent stocking use. + FH    Past Medical History:   Diagnosis Date    Arthritis     GERD (gastroesophageal reflux disease)     Hyperlipidemia     Hyperlipidemia 5/27/2012    Incomplete RBBB     Ruptured disk 2019     Past Surgical History:   Procedure Laterality Date    COLONOSCOPY  9/9/2020    COLONOSCOPY WITH BIOPSY performed by Keturah Krabbe, MD at [address] N/A 9/9/2020    EGD BIOPSY performed by Keturah Krabbe, MD at [facility] 9/9/2020    EGD DILATION BALLOON performed by Keturah Krabbe, MD at [address]   Allergen Reactions    Pcn [Penicillins]      Current Outpatient Medications   Medication Sig Dispense Refill    indapamide (LOZOL) 1.25 MG tablet Take 1 tablet by mouth every morning 90 tablet 0    omeprazole (PRILOSEC) 10 MG delayed release capsule Take 10 mg by mouth daily       No current facility-administered medications for this visit.      Social History     Socioeconomic History    Marital status:      Spouse name: Not on file    Number of children: Not on file    Years of education: Not on file    Highest education level: Not on file   Occupational History    Not on file   Tobacco Use    Smoking status: Current Every Day Smoker     Packs/day: 0.50     Years: 54.00     Pack years: 27.00     Types: Cigarettes    Smokeless tobacco: Never Used   Vaping Use    Vaping Use: Never used   Substance and Sexual Activity    Alcohol use: Not Currently     Comment: Pt reported that he stopped drinking 13 years ago.  Drug use: Not Currently     Types: Opiates , Cocaine     Comment: Pt reported that he's been clean for 13 years.  Sexual activity: Not on file   Other Topics Concern    Not on file   Social History Narrative    Not on file     Social Determinants of Health     Financial Resource Strain:     Difficulty of Paying Living Expenses:    Food Insecurity:     Worried About Running Out of Food in the Last Year:     [address] in the Last Year:    Transportation Needs:     Lack of Transportation (Medical):  Lack of Transportation (Non-Medical):    Physical Activity:     Days of Exercise per Week:     Minutes of Exercise per Session:    Stress:     Feeling of Stress :    Social Connections:     Frequency of Communication with Friends and Family:     Frequency of Social Gatherings with Friends and Family:     Attends Confucianist Services:     Active Member of Clubs or Organizations:     Attends Club or Organization Meetings:     Marital Status:    Intimate Partner Violence:     Fear of Current or Ex-Partner:     Emotionally Abused:     Physically Abused:     Sexually Abused:      Family History   Problem Relation Age of Onset    Breast Cancer Mother 61         Review of Systems   All other systems reviewed and are negative. 15 pt ros confirmed by this MD personally. Objective:   Physical Exam  Vitals and nursing note reviewed. Constitutional:       Appearance: He is normal weight. HENT:      Head: Normocephalic. Right Ear: External ear normal.      Left Ear: External ear normal.      Nose: Nose normal.      Mouth/Throat:      Mouth: Mucous membranes are moist.      Pharynx: Oropharynx is clear.    Eyes:      Extraocular Movements: Extraocular movements intact. Conjunctiva/sclera: Conjunctivae normal.   Cardiovascular:      Rate and Rhythm: Normal rate and regular rhythm. Pulses: Normal pulses. Heart sounds: Normal heart sounds. Pulmonary:      Effort: Pulmonary effort is normal.      Breath sounds: Normal breath sounds. Abdominal:      General: Abdomen is flat. Palpations: Abdomen is soft. There is no mass. Musculoskeletal:      Cervical back: Normal range of motion and neck supple. Right lower leg: No edema. Left lower leg: No edema. Skin:     General: Skin is warm and dry. Capillary Refill: Capillary refill takes less than 2 seconds. Comments: Hemosiderin discoloration B ankles/gaitor without breakdown   Neurological:      General: No focal deficit present. Mental Status: He is alert and oriented to person, place, and time. Cranial Nerves: No cranial nerve deficit. Sensory: No sensory deficit. Motor: No weakness. Coordination: Coordination normal.      Gait: Gait normal.   Psychiatric:         Mood and Affect: Mood normal.         Behavior: Behavior normal.         Thought Content: Thought content normal.         Judgment: Judgment normal.         R size  L size   +  Spider  telangiectasias +      Reticular veins     Thigh/calf 8-10 mm Varicose   veins Thigh/calf 8-10 mm       Assessment:      Symptomatic varicose veins B legs (CEAP 4a)      Plan:      Begin TH 20/30 mmHg compression stockings. F/U with MD after venous reflux scan to discuss results and treatment options. Pt understands and agrees to this approach.

## 2021-07-02 ENCOUNTER — IMMUNIZATION (OUTPATIENT)
Dept: PRIMARY CARE CLINIC | Age: 66
End: 2021-07-02
Payer: COMMERCIAL

## 2021-07-02 PROCEDURE — 0002A COVID-19, PFIZER VACCINE 30MCG/0.3ML DOSE: CPT | Performed by: FAMILY MEDICINE

## 2021-07-02 PROCEDURE — 91300 COVID-19, PFIZER VACCINE 30MCG/0.3ML DOSE: CPT | Performed by: FAMILY MEDICINE

## 2021-09-02 DIAGNOSIS — I83.893 SYMPTOMATIC VARICOSE VEINS OF BOTH LOWER EXTREMITIES: Primary | ICD-10-CM

## 2021-09-03 ENCOUNTER — PROCEDURE VISIT (OUTPATIENT)
Dept: VASCULAR SURGERY | Age: 66
End: 2021-09-03
Payer: COMMERCIAL

## 2021-09-03 DIAGNOSIS — I83.893 SYMPTOMATIC VARICOSE VEINS OF BOTH LOWER EXTREMITIES: ICD-10-CM

## 2021-09-03 PROCEDURE — 93970 EXTREMITY STUDY: CPT | Performed by: SURGERY

## 2021-09-10 ENCOUNTER — OFFICE VISIT (OUTPATIENT)
Dept: VASCULAR SURGERY | Age: 66
End: 2021-09-10
Payer: COMMERCIAL

## 2021-09-10 VITALS
WEIGHT: 150 LBS | SYSTOLIC BLOOD PRESSURE: 124 MMHG | DIASTOLIC BLOOD PRESSURE: 68 MMHG | HEART RATE: 79 BPM | HEIGHT: 69 IN | BODY MASS INDEX: 22.22 KG/M2

## 2021-09-10 DIAGNOSIS — I83.893 SYMPTOMATIC VARICOSE VEINS OF BOTH LOWER EXTREMITIES: Primary | ICD-10-CM

## 2021-09-10 PROCEDURE — 99213 OFFICE O/P EST LOW 20 MIN: CPT | Performed by: SURGERY

## 2021-09-10 NOTE — PROGRESS NOTES
Seen back for symptomatic varicose veins B legs. Pt has religiously been wearing the prescribed 20/30 mmHg TH stockings with some benefit as decreased but not eliminated pain. .  No reported edema, bleeding, tender cord, skin changes or ulceration. Recent venous reflux scan performed on 9/3/2021 at [facility]. EXAM:  No edema, ulceration or dermatitis. All VVs soft, nontender without erythema. VRS - B GSV reflux with R LSV reflux; L CFV/FV deep reflux    A/P: Chronic superficial venous insufficiency B legs with secondary symptomatic varicose veins   SIGNIFICANT B AXIAL REFLUX with LARGE VARICOSITIES. Surgery is recommended - B GSV RFA + R LSV RFA + B stab phlebectomies. This was discussed in terms that the patient could understand. The risks, including bleeding, clotting, bruising, swelling, neuritis, skin dimpling, infection, pigmentation, scarring, and mortality were discussed. I answered all questions pertaining to surgery and post operative expectations related to return to work and daily activity. Time spent counseling and coordination of care: 25 minutes. More than 50% of visit was spent reviewing and discussing venous duplex scan. He will continue compression stockings and schedule as recommended if desired.

## 2021-10-28 NOTE — PROGRESS NOTES
Jasvir Pretty   78 y.o. male   1955    HPI:    Patient was last seen by me on 6/18/2021. Reason(s) for visit:   Chief Complaint   Patient presents with    Follow-up    Hypertension       HTN:  -Patient has not been checking BP readings regularly   -Patient denied issues with frequent headache, chest pain, shortness of breath, palpitations, malaise, etc.  -Patient stated that he has cut back on drinking energy drinks (drinks only on Thurs)    Varicose veins:  -Patient referred to vascular surgeon (Dr. Silviano Lam) and had his first visit with them on 7/1/2021. He was advised to wear compression stockings, which the patient informed the vascular surgeon he had been \"religiously\" wearing them. He reported of \"some benefit, but no elimination of pain\". No issues with edema, bleeding, skin changes or ulceration, etc.  He had a venous reflux scan done on 9/3/2021 and the findings were reviewed with a follow-up visit on 9/10/2021. Significant reflux with large varicosities were noted and surgery was recommended. Patient is awaiting insurance approval.  -Patient reported that the work restrictions I had placed became effective almost immediately - refer to my last note for further details. Erectile dysfunction:  -Patient stated that he has issues with maintaining erection.  -Has taken Viagra 4-5 years ago and was effective with no side effects. Stated Cialis was ineffective.  -Patient and patient's wife both do not have libido issues. He stated that wife has been frustrated. Current smoker:  -Patient stated that he's down to smoking 10 cigs. Patient vocalized no assistance. Allergies   Allergen Reactions    Pcn [Penicillins]        No current outpatient medications on file prior to visit. No current facility-administered medications on file prior to visit.         Family History   Problem Relation Age of Onset    Breast Cancer Mother 61       Social History     Tobacco Use    Smoking 07/01/21 150 lb (68 kg)       BP Readings from Last 3 Encounters:   11/01/21 110/64   09/10/21 124/68   07/01/21 129/66       Pulse Readings from Last 3 Encounters:   11/01/21 79   09/10/21 79   07/01/21 78       /64   Pulse 79   Temp 97.3 °F (36.3 °C)   Wt 148 lb 3.2 oz (67.2 kg)   SpO2 98%   BMI 21.89 kg/m²      Physical Exam  Vitals reviewed. Constitutional:       General: He is awake. He is not in acute distress. Appearance: He is not ill-appearing or diaphoretic. HENT:      Head: Normocephalic and atraumatic. No abrasion or masses. Hair is normal.      Right Ear: External ear normal.      Left Ear: External ear normal.      Nose: Nose normal.   Eyes:      General: Lids are normal. Gaze aligned appropriately. No scleral icterus. Right eye: No discharge. Left eye: No discharge. Extraocular Movements: Extraocular movements intact. Conjunctiva/sclera: Conjunctivae normal.   Neck:      Trachea: Phonation normal.   Cardiovascular:      Rate and Rhythm: Normal rate and regular rhythm. Pulmonary:      Effort: Pulmonary effort is normal. No respiratory distress. Breath sounds: No wheezing, rhonchi or rales. Abdominal:      General: Abdomen is flat. There is no distension. Palpations: Abdomen is soft. Musculoskeletal:         General: No deformity. Normal range of motion. Cervical back: Normal range of motion. No erythema. Right lower leg: No edema. Left lower leg: No edema. Skin:     Coloration: Skin is not cyanotic, jaundiced or pale. Findings: No abrasion, abscess, bruising, ecchymosis, erythema, signs of injury, laceration, lesion, petechiae, rash or wound. Neurological:      General: No focal deficit present. Mental Status: He is alert. Mental status is at baseline. GCS: GCS eye subscore is 4. GCS verbal subscore is 5. GCS motor subscore is 6. Cranial Nerves: No cranial nerve deficit, dysarthria or facial asymmetry. Motor: No weakness, tremor, atrophy or seizure activity. Coordination: Coordination normal.      Gait: Gait is intact. Psychiatric:         Attention and Perception: Attention and perception normal.         Mood and Affect: Mood and affect normal.         Speech: Speech normal.         Behavior: Behavior normal. Behavior is cooperative. Thought Content: Thought content normal.       Assessment/Plan:   Aristeo was seen today for follow-up and hypertension. Diagnoses and all orders for this visit:    Symptomatic varicose veins of both lower extremities  Comments:  Advised to continue wearing compression stockings. Recommended supplement as noted below. Orders:  -     [facility]; Take 300 mg by mouth daily    Essential hypertension  Comments:  BP stable on no meds. Low salt diet. Exercise. Erectile dysfunction, unspecified erectile dysfunction type  -     sildenafil (VIAGRA) 100 MG tablet; Take 1 tablet by mouth as needed for Erectile Dysfunction    Need for Tdap vaccination  -     Tdap (age 6y and older) IM (Boostrix)    Need for hepatitis B booster vaccination  Comments:  Third and final dose of Hep B vaccine given. Orders:  -     Hep B Vaccine Adult (ENGERIX-B)    Current smoker  -     UT TOBACCO USE CESSATION INTERMEDIATE 3-10 MINUTES      Other: patient declined flu vaccine; never had flu before; informed patient can get flu    I reviewed the plan of care with the patient. Patient acknowledged understanding and agreed with plan of care overall.     Medications Discontinued During This Encounter   Medication Reason    omeprazole (PRILOSEC) 40 MG delayed release capsule LIST CLEANUP    omeprazole (PRILOSEC) 10 MG delayed release capsule LIST CLEANUP    ibuprofen (ADVIL;MOTRIN) 800 MG tablet LIST CLEANUP    doxycycline hyclate (VIBRA-TABS) 100 MG tablet LIST CLEANUP    clindamycin (CLEOCIN) 150 MG capsule LIST CLEANUP    brompheniramine-pseudoephedrine-DM 2-30-10 knowledge and recollection of the encounter with the patient using one of my own customized note templates utilizing a combination of typing and dictating with the [address] [place] [address] speech recognition software. As a result, the note may possibly contain various errors (e.g. spelling, grammar, and non-sensible words/phrases/statements) despite reviewing the note prior to signing it for completion. Christian P. Diantha Paget M.D.   [address] [place] [address]    Electronically signed by Filomena Castro M.D. on 11/1/2021 at 6:53 PM.

## 2021-11-01 ENCOUNTER — OFFICE VISIT (OUTPATIENT)
Dept: FAMILY MEDICINE CLINIC | Age: 66
End: 2021-11-01
Payer: COMMERCIAL

## 2021-11-01 VITALS
BODY MASS INDEX: 21.89 KG/M2 | WEIGHT: 148.2 LBS | OXYGEN SATURATION: 98 % | HEART RATE: 79 BPM | DIASTOLIC BLOOD PRESSURE: 64 MMHG | SYSTOLIC BLOOD PRESSURE: 110 MMHG | TEMPERATURE: 97.3 F

## 2021-11-01 DIAGNOSIS — I10 ESSENTIAL HYPERTENSION: ICD-10-CM

## 2021-11-01 DIAGNOSIS — F17.200 CURRENT SMOKER: ICD-10-CM

## 2021-11-01 DIAGNOSIS — Z23 NEED FOR TDAP VACCINATION: ICD-10-CM

## 2021-11-01 DIAGNOSIS — N52.9 ERECTILE DYSFUNCTION, UNSPECIFIED ERECTILE DYSFUNCTION TYPE: ICD-10-CM

## 2021-11-01 DIAGNOSIS — Z23 NEED FOR HEPATITIS B BOOSTER VACCINATION: ICD-10-CM

## 2021-11-01 DIAGNOSIS — I83.893 SYMPTOMATIC VARICOSE VEINS OF BOTH LOWER EXTREMITIES: Primary | ICD-10-CM

## 2021-11-01 PROCEDURE — 99406 BEHAV CHNG SMOKING 3-10 MIN: CPT | Performed by: FAMILY MEDICINE

## 2021-11-01 PROCEDURE — 90715 TDAP VACCINE 7 YRS/> IM: CPT | Performed by: FAMILY MEDICINE

## 2021-11-01 PROCEDURE — 90746 HEPB VACCINE 3 DOSE ADULT IM: CPT | Performed by: FAMILY MEDICINE

## 2021-11-01 PROCEDURE — 99213 OFFICE O/P EST LOW 20 MIN: CPT | Performed by: FAMILY MEDICINE

## 2021-11-01 PROCEDURE — 90472 IMMUNIZATION ADMIN EACH ADD: CPT | Performed by: FAMILY MEDICINE

## 2021-11-01 PROCEDURE — 90471 IMMUNIZATION ADMIN: CPT | Performed by: FAMILY MEDICINE

## 2021-11-01 RX ORDER — SILDENAFIL 100 MG/1
100 TABLET, FILM COATED ORAL PRN
Qty: 10 TABLET | Refills: 5 | Status: SHIPPED | OUTPATIENT
Start: 2021-11-01 | End: 2022-08-29

## 2021-11-01 RX ORDER — OMEPRAZOLE 40 MG/1
CAPSULE, DELAYED RELEASE ORAL
COMMUNITY
Start: 2021-10-20 | End: 2021-11-01

## 2021-11-01 RX ORDER — BENZONATATE 100 MG/1
CAPSULE ORAL
COMMUNITY
Start: 2021-10-02 | End: 2021-11-01

## 2021-11-01 RX ORDER — ACETAMINOPHEN AND CODEINE PHOSPHATE 300; 30 MG/1; MG/1
TABLET ORAL
COMMUNITY
Start: 2021-09-09 | End: 2021-11-01

## 2021-11-01 RX ORDER — DOXYCYCLINE HYCLATE 100 MG
TABLET ORAL
COMMUNITY
Start: 2021-10-02 | End: 2021-11-01

## 2021-11-01 RX ORDER — CLINDAMYCIN HYDROCHLORIDE 150 MG/1
CAPSULE ORAL
COMMUNITY
Start: 2021-09-09 | End: 2021-11-01

## 2021-11-01 RX ORDER — IBUPROFEN 800 MG/1
TABLET ORAL
COMMUNITY
Start: 2021-09-27 | End: 2021-11-01

## 2021-11-01 RX ORDER — BROMPHENIRAMINE MALEATE, PSEUDOEPHEDRINE HYDROCHLORIDE, AND DEXTROMETHORPHAN HYDROBROMIDE 2; 30; 10 MG/5ML; MG/5ML; MG/5ML
SYRUP ORAL
COMMUNITY
Start: 2021-10-02 | End: 2021-11-01

## 2021-11-01 ASSESSMENT — ENCOUNTER SYMPTOMS
RHINORRHEA: 0
COUGH: 0
BLOOD IN STOOL: 0
DIARRHEA: 0
CONSTIPATION: 0
VOMITING: 0
NAUSEA: 0
ABDOMINAL DISTENTION: 0
SHORTNESS OF BREATH: 0
ABDOMINAL PAIN: 0
WHEEZING: 0
CHEST TIGHTNESS: 0
SINUS PRESSURE: 0
BACK PAIN: 0
TROUBLE SWALLOWING: 0

## 2022-01-17 NOTE — PROGRESS NOTES
-Preoperative Consultation-      Patient name: Camilla Williamson    YOB: 1955    Date of Service: 1/19/2022    Chief Complaint   Patient presents with    Pre-op Exam     1/31- varicose veins in both legs. [facility]. This patient presents to the office today for a preoperative consultation    Surgery type: treatment of varicose veins  Surgeon: Dr. Sandra Ramirez  Date of operation: January 31, 2022. Location: [facility].      History: The current problem began 15-20 years ago and symptoms have worsened with time. Conservative therapy includes: Yes: compression stocking, which has been not very effective. .    Planned anesthesia: to be determined by CRNA/anesthesiologist   Known anesthesia problems: None   Bleeding risk: No recent or remote history of abnormal bleeding  Personal or FH of DVT/PE: No    Patient objection to receiving blood products: No    Patient Active Problem List   Diagnosis    GERD (gastroesophageal reflux disease)    Incomplete RBBB    Osteoarthritis of lumbar spine    Alcohol abuse    Prediabetes    [facility] of [facility] abuse [facility] ([facility]    Chronic hepatitis C virus genotype 1 infection ([facility]    Acrophobia     Past Surgical History:   Procedure Laterality Date    COLONOSCOPY  9/9/2020    COLONOSCOPY WITH BIOPSY performed by Margaret Montero MD at [facility] ENDOSCOPY N/A 9/9/2020    EGD BIOPSY performed by Margaret Montero MD at [address] [place], [place] [address] N/A 9/9/2020    EGD DILATION BALLOON performed by Margaret Montero MD at [facility] END[facility]COPY     Family History   Problem Relation Age of Onset    Breast Cancer Mother 61     Allergies   Allergen Reactions    Pcn [Penicillins]      Current Outpatient Medications   Medication Sig Dispense Refill    omeprazole (PRILOSEC) 40 MG delayed release capsule TAKE 1 CAPSULE BY MOUTH EVERY DAY      [place]  MG CPCR Take 300 mg Violence:     Fear of Current or Ex-Partner: Not on file    Emotionally Abused: Not on file    Physically Abused: Not on file    Sexually Abused: Not on file   Housing Stability:     Unable to Pay for Housing in the Last Year: Not on file    Number of Places Lived in the Last Year: Not on file    Unstable Housing in the Last Year: Not on file     Review of Systems:  A comprehensive review of systems was negative except for what was noted in the HPI. Physical Exam:     Wt Readings from Last 3 Encounters:   01/19/22 149 lb 14.4 oz (68 kg)   11/01/21 148 lb 3.2 oz (67.2 kg)   09/10/21 150 lb (68 kg)     Temp Readings from Last 3 Encounters:   01/19/22 96.8 °F (36 °C)   11/01/21 97.3 °F (36.3 °C)   06/18/21 97.2 °F (36.2 °C)     BP Readings from Last 3 Encounters:   01/19/22 118/70   11/01/21 110/64   09/10/21 124/68     Pulse Readings from Last 3 Encounters:   01/19/22 69   11/01/21 79   09/10/21 79      Constitutional: He is oriented to person, place, and time. He appears well-developed and well-nourished. No distress. Head: Normocephalic and atraumatic. Mouth/Throat: Uvula is midline, oropharynx is clear and moist and mucous membranes are normal.   Eyes: Conjunctivae and EOM are normal. Pupils are equal, round, and reactive to light. Neck: Trachea normal and normal range of motion. Neck supple. No JVD present. Carotid bruit is not present. No mass and no thyromegaly present. Cardiovascular: Normal rate, regular rhythm, normal heart sounds and intact distal pulses. Exam reveals no gallop and no friction rub. No murmur heard. Pulmonary/Chest: Effort normal and breath sounds normal. No respiratory distress. He has no wheezes. He has no rales. Abdominal: Soft. Normal aorta and bowel sounds are normal. He exhibits no distension and no mass. There is no hepatosplenomegaly. No tenderness. Musculoskeletal: He exhibits no edema and no tenderness.    Neurological: He is alert and oriented to person, place, and time. He has normal strength. No cranial nerve deficit or sensory deficit. Coordination and gait normal.   Skin: Skin is warm and dry. No rash noted. No erythema. Psychiatric: He has a normal mood and affect. His behavior is normal.     EKG Interpretation:  normal EKG, normal sinus rhythm, new LAFB noted. RBB with signs of mild LVH noted. [place]un stress test: June 2012 - normal    Lab Review:   Lab Results   Component Value Date     05/25/2021     05/04/2021     05/24/2012    K 4.3 05/25/2021    K 4.4 05/04/2021    K 4.2 05/24/2012    CO2 25 05/25/2021    CO2 26 05/04/2021    CO2 30 05/24/2012    BUN 12 05/25/2021    BUN 12 05/04/2021    BUN 7 05/24/2012    CREATININE 0.9 05/25/2021    CREATININE 0.9 05/04/2021    CREATININE 0.9 05/24/2012    GLUCOSE 82 05/25/2021    GLUCOSE 80 05/04/2021    GLUCOSE 91 05/24/2012    CALCIUM 9.5 05/25/2021    CALCIUM 9.5 05/04/2021    CALCIUM 10.1 05/24/2012     Lab Results   Component Value Date    WBC 9.8 05/25/2021    WBC 10.1 05/04/2021    WBC 11.4 08/21/2020    HGB 12.3 05/25/2021    HGB 12.3 05/04/2021    HGB 13.3 08/21/2020    HCT 36.3 05/25/2021    HCT 36.1 05/04/2021    HCT 39.8 08/21/2020    MCV 84.3 05/25/2021    MCV 84.2 05/04/2021    MCV 83.0 08/21/2020     05/25/2021     05/04/2021     11/07/2020         Assessment:   Abdirahman Goodson is a 78 y.o. patient with planned surgery as noted above.       Known risk factors for perioperative complications: Peripheral vascular disease    Current medications which may produce withdrawal symptoms if withheld perioperatively: none     Pre-Operative Risk assessment using 2014 ACC/AHA guidelines:   -Emergent procedure: NO  -Active Cardiac Condition: NO (decompensated HF, Arrhythmia, MI <3 weeks, severe valve disease)  -Risk Level of Procedure: High Risk (aortic, major vascular, or peripheral vascular, etc.)  -Revised Cardiac Risk Index Risk factors: None  -Exercise Tolerance before Surgery: good        HAS-BLED Score                            Risk Factors      Points   Hypertension  Uncontrolled, >570 mmHg systolic   No=0   Renal disease  Dialysis, transplant, Cr>2.26 mg/dL or >200 µmol/L   No=0   Liver disease   Cirrhosis or bilirubin >2x normal with AST/ALT/AP >3x normal   No=0   Stroke history   No=0   Prior history of major bleeding or predisposition to bleeding     No=0   Labile INR  Unstable/high INRs, time in therapeutic range <60%   No=0   Age >71   No=0   Medication usage predisposing to bleeding   Aspirin, Clopidogrel, Prasugrel, Ticagrelor, NSAIDs, warfarin, DOACs   No=0   Alcohol use   >7 drinks/week   No=0     HAS-BLED score:    0:  Risk was 0.9% in one validation study and 1.13 bleeds per 100 patient-years in another validation study. ICD-10-CM    1. Preoperative examination  Z01.818 EKG 12 Lead     COVID-19   2. Symptomatic varicose veins of both lower extremities  I83.893    3. Abnormal EKG  R94.31 [facility]   4. Educated about 2019 novel coronavirus infection  Z71.89         Plan:   1. Preoperative workup as follows: hemoglobin, hematocrit, electrolytes, creatinine, glucose, liver function studies, COVID-19 testing (3-5 days prior to procedure - depending on surgical site recommendation)     2. Change in medication regiment before surgery: None  -On anti-platelet drug(s): none   -On anticoagulant drug: none   -On SGLT-2 inhibitor drug: none    There are no discontinued medications.     3. Prophylaxis for cardiac events with perioperative beta-blockers: none  ACC/AHA indications for pre-operative beta-blocker use:    · Vascular surgery with history of positive stress test  · Intermediate or high risk surgery with history of CAD   · Intermediate or high risk surgery with multiple clinical predictors of CAD- 2 of the following: history of compensated or prior heart failure, history of cerebrovascular disease, DM, or renal insufficiency  Routine administration of higher-dose, long-acting metoprolol in beta-blocker-naïve patients on the day of surgery, and in the absence of dose titration is associated with an overall increase in mortality. Beta-blockers should be started days to weeks prior to surgery and titrated to pulse < 70.    4. Deep vein thrombosis prophylaxis: regimen to be chosen by surgical team; otherwise counseled patient on ambulating as much as possible and minimizing sedentary time as well as discussed about moving lower extremities around while lying down to increase circulation    5. Antibiotic prophylaxis: none    6. Smoking cessation and education provided (if applicable)     7. Further recommendations from consultants: None    8. Scanned pre-op form to media section and faxed to number listed on form (if applicable)    Pre-op assessment: referral to cardiology for risk assessment and cardiac stress test given abnormal EKG findings that are new. Other: I discussed with patient about avoiding NSAIDS at least 7 days prior to procedure and afterwards. Counseled patient to only use Tylenol for pain and if pain persists to schedule an appointment with me. Daren Frias 177    Electronically signed by Ronald Velazquez on 1/19/2022 at 7:07 PM.

## 2022-01-19 ENCOUNTER — OFFICE VISIT (OUTPATIENT)
Dept: FAMILY MEDICINE CLINIC | Age: 67
End: 2022-01-19
Payer: COMMERCIAL

## 2022-01-19 VITALS
OXYGEN SATURATION: 99 % | DIASTOLIC BLOOD PRESSURE: 70 MMHG | BODY MASS INDEX: 22.2 KG/M2 | TEMPERATURE: 96.8 F | SYSTOLIC BLOOD PRESSURE: 118 MMHG | HEIGHT: 69 IN | HEART RATE: 69 BPM | WEIGHT: 149.9 LBS

## 2022-01-19 DIAGNOSIS — R94.31 ABNORMAL EKG: ICD-10-CM

## 2022-01-19 DIAGNOSIS — Z71.89 EDUCATED ABOUT 2019 NOVEL CORONAVIRUS INFECTION: ICD-10-CM

## 2022-01-19 DIAGNOSIS — I83.893 SYMPTOMATIC VARICOSE VEINS OF BOTH LOWER EXTREMITIES: ICD-10-CM

## 2022-01-19 DIAGNOSIS — Z01.818 PREOPERATIVE EXAMINATION: Primary | ICD-10-CM

## 2022-01-19 PROCEDURE — 93000 ELECTROCARDIOGRAM COMPLETE: CPT | Performed by: FAMILY MEDICINE

## 2022-01-19 PROCEDURE — 99212 OFFICE O/P EST SF 10 MIN: CPT | Performed by: FAMILY MEDICINE

## 2022-01-19 RX ORDER — OMEPRAZOLE 40 MG/1
CAPSULE, DELAYED RELEASE ORAL
COMMUNITY
Start: 2021-12-26

## 2022-01-25 NOTE — PROGRESS NOTES
pattern, or activity level. · Eyes: No visual changes or diplopia. No scleral icterus. · ENT: No Headaches, hearing loss or vertigo. No mouth sores or sore throat. · Cardiovascular: Reviewed in HPI  · Respiratory: No cough or wheezing, no sputum production. No hematemesis. · Gastrointestinal: No abdominal pain, appetite loss, blood in stools. No change in bowel or bladder habits. · Genitourinary: No dysuria, trouble voiding, or hematuria. · Musculoskeletal:  No gait disturbance, weakness or joint complaints. · Integumentary: No rash or pruritis. · Neurological: No headache, diplopia, change in muscle strength, numbness or tingling. No change in gait, balance, coordination, mood, affect, memory, mentation, behavior. · Psychiatric: No anxiety, no depression. · Endocrine: No malaise, fatigue or temperature intolerance. No excessive thirst, fluid intake, or urination. No tremor. · Hematologic/Lymphatic: No abnormal bruising or bleeding, blood clots or swollen lymph nodes. · Allergic/Immunologic: No nasal congestion or hives. Physical Examination:    There were no vitals filed for this visit. Wt Readings from Last 1 Encounters:   01/19/22 149 lb 14.4 oz (68 kg)     Constitutional and General Appearance: Appears stated age, NAD   Respiratory:  · Normal excursion and expansion without use of accessory muscles  · Resp Auscultation: Normal breath sounds without dullness  Cardiovascular:  · The apical impulses not displaced  · Heart tones are crisp and normal  · Cervical veins are not engorged  · The carotid upstroke is normal in amplitude and contour without delay or bruit  · Peripheral pulses are symmetrical and full  · There is no clubbing, cyanosis of the extremities.   · No edema  · Femoral Arteries: 2+ and equal  · Pedal Pulses: 2+ and equal   Abdomen:  · No masses or tenderness  · Liver/Spleen: No Abnormalities Noted  Neurological/Psychiatric:  · Alert and oriented in all spheres  · Moves all extremities well  · Exhibits normal gait balance and coordination  · No abnormalities of mood, affect, memory, mentation, or behavior are noted    LE Venous Reflux Study 9/3/2021  Right Impression  There is no evidence of deep or superficial venous thrombosis noted in the right lower extremity (veins imaged include the  common femoral, deep femoral, femoral, popliteal, greater saphenous and short saphenous). There is no evidence of deep venous reflux (lasting longer than 1 second) noted in the right lower extremity. There is evidence of superficial venous reflux (lasting longer than 0.5 seconds) noted in the greater saphenous vein from the  sapheno-femoral junction to the distal calf. Incompetent Perforators: none visualized  The short saphenous vein does not communicate with the popliteal vein. There is evidence of reflux noted in the short saphenous vein from mid calf to distal calf. Left Impression  There is no evidence of deep or superficial venous thrombosis noted in the left lower extremity (veins imaged include the common  femoral, deep femoral, femoral, popliteal, greater saphenous and short saphenous). There is evidence of deep venous reflux (lasting longer than 1 second) noted in the common femoral and femoral vein. There is evidence of superficial venous reflux (lasting longer than 0.5 seconds) noted in the greater saphenous vein from the mid  thigh to the distal calf. Incompetent Perforators: mid/distal calf from a posterior tibial to a varicosity (3.5 mm). The short saphenous vein does not communicate with the popliteal vein. U/S for AAA 10/3/2020  1. Atherosclerotic changes of the abdominal aorta with no evidence of   abdominal aortic aneurysm. 2. Nonvisualization of the aortic bifurcation due to overlying bowel gas as   described above. Stress echo 2012  - Stress ECG conclusions: The stress ECG was negative for ischemia.    - Stress echo results: Left ventricular ejection fraction was normal at rest and with stress. There was no echocardiographic evidence for stress-induced ischemia.   -Impressions: Normal study.   -Baseline ECG: SR with intermittent RBBB. Assessment:    No diagnosis found. Preop Risk Assessment (for varicose vein surgery)  EKG 1/19/22 (per PCP office)> NSR 68, (I)RBBB, LAFB (new)    Hypertension  Stable    LE varicose veins, large, sho  Venous scan 9/3/21> significant reflux noted  Wears compression hose  PCP recommended [facility] ER 300mg qd  Evaluated by Dr. Felipa Chang, needs surgical intervention    Tobacco use  Long time smoker      PLAN:  1.    I appreciate the opportunity of cooperating in the care of this individual.    Robert Newman M.D., [facility]

## 2022-01-26 RX ORDER — M-VIT,TX,IRON,MINS/CALC/FOLIC 27MG-0.4MG
1 TABLET ORAL DAILY
COMMUNITY

## 2022-01-26 NOTE — PROGRESS NOTES
Name_______________________________________Printed:____________________  Date and time of surgery__1/31/2022_____1100_________________Arrival Time:__0900  main______________   1. The instructions given regarding when and if a patient needs to stop oral intake prior to surgery varies. Follow the specific instructions you were given                  ___Nothing to eat or to drink after Midnight the night before.                   ____Carbo loading or ERAS instructions will be given to select patients-if you have been given those instructions -please do the following                           The evening before your surgery after dinner before midnight drink 40 ounces of gatorade. If you are diabetic use sugar free. The morning of surgery drink 40 ounces of water. This needs to be finished 3 hours prior to your surgery start time. 2. Take the following pills with a small sip of water on the morning of surgery_prilosec__________________________________________________                  Do not take blood pressure medications ending in pril or sartan the smith prior to surgery or the morning of surgery_   3. Aspirin, Ibuprofen, Advil, Naproxen, Vitamin E and other Anti-inflammatory products and supplements should be stopped for 5 -7days before surgery or as directed by your physician. 4. Check with your Doctor regarding stopping Plavix, Coumadin,Eliquis, Lovenox,Effient,Pradaxa,Xarelto, Fragmin or other blood thinners and follow their instructions. 5. Do not smoke, and do not drink any alcoholic beverages 24 hours prior to surgery. This includes NA Beer. Refrain from the usage of any recreational drugs. 6. You may brush your teeth and gargle the morning of surgery. DO NOT SWALLOW WATER   7. You MUST make arrangements for a responsible adult to stay on site while you are here and take you home after your surgery. You will not be allowed to leave alone or drive yourself home.   It is strongly suggested someone stay with additional information:  [facility]/patient-eprep              20.During flu season no children under the age of 15 are permitted in the hospital for the safety of all patients. 21. If you take a long acting insulin in the evening only  take half of your usual  dose the night  before your procedure              22. If you use a c-pap please bring DOS if staying overnight,             23.For your convenience [facility] has a pharmacy on site to fill your prescriptions. 24. If you use oxygen and have a portable tank please bring it  with you the DOS             25. Bring a complete list of all your medications with name and dose include any supplements. 26. Other__________________________________________   *Please call pre admission testing if you any further questions   Aleksandra Salgado   [address] [place] [address]. [place]  [phone]   [facility]    ___ Covid test to be done 3-5 days prior to scheduled surgery -patient aware they are REQUIRED to bring a copy of the negative result DOS-if they receive a positive result to notify their surgeon         If known - indicate where patient is getting covid test done ___________________________________________________________    ___ Rapid - DOS    ___ Other___________________________________      [facility] POLICY(subject to change)    There is a one visitor policy at [facility] for all surgeries and endoscopies. Whether the visitor can stay or will be asked to wait in the car will depend on the current policy and if social distancing can be maintained. The policy is subject to change at any time. Please make sure the visitor has a cell phone that is on,charged and able to accept calls, as this may be the way that the staff communicates with them. Pain management is NO VISITOR policyThe patients ride is expected to remain in the car with a cell phone for

## 2022-01-26 NOTE — PROGRESS NOTES
[facility]   Cardiac Consultation    Referring Provider:  Tiajuana Najjar     CC- abnormal ECG   History of Present Illness:  Mr. Fabiano Davis is seen as a cardiac consultation request by Dr. Mai Leon for cardiac evaluation s/p \"abnormal EKG (new RBBB). \" He is preparing for surgical intervention of large varicose veins (Dr. Shanthi Jonas) and EKG done as part of the risk assessment process. His other history includes hypertension,  GERD. Lipid panel from 5/5/2021 was normal with LDL of 83 (not on a statin). Stress echo from 2012 was normal (showed incomplete RBBB). Today, he states he working at [employer] and [employer]. He states he does better than some of the young people at his jobs. He got the Covid Vaccine. Denies chest pain, shortness of breath, syncope, orthopnea, palpitations, or dizziness. Past Medical History:   has a past medical history of Arthritis, GERD (gastroesophageal reflux disease), Hyperlipidemia, Hyperlipidemia, Incomplete RBBB, Ruptured disk, and Varicose veins of both lower extremities. Surgical History:   has a past surgical history that includes Upper gastrointestinal endoscopy (N/A, 9/9/2020); Upper gastrointestinal endoscopy (N/A, 9/9/2020); Colonoscopy (9/9/2020); and Hand surgery (1995). Social History:   reports that he has been smoking cigarettes. He has a 27.00 pack-year smoking history. He has never used smokeless tobacco. He reports previous alcohol use. He reports previous drug use. Drugs: Opiates  and Cocaine. Family History:  family history includes Breast Cancer (age of onset: 61) in his mother. Home Medications:  Prior to Admission medications    Medication Sig Start Date End Date Taking?  Authorizing Provider   Multiple Vitamins-Minerals (THERAPEUTIC MULTIVITAMIN-MINERALS) tablet Take 1 tablet by mouth daily   Yes Historical Provider, MD   KRILL OIL PO Take by mouth   Yes Historical Provider, MD   omeprazole (PRILOSEC) 40 MG delayed release capsule TAKE 1 CAPSULE BY MOUTH EVERY DAY 12/26/21  Yes Historical Provider, MD   [facility] SCREVEN  MG CPCR Take 300 mg by mouth daily 11/1/21  Yes Ranjith South   sildenafil (VIAGRA) 100 MG tablet Take 1 tablet by mouth as needed for Erectile Dysfunction  Patient not taking: Reported on 1/27/2022 11/1/21   Ranjith Berkowitzr        Allergies:  Pcn [penicillins]     Review of Systems:   · Constitutional: there has been no unanticipated weight loss. There's been no change in energy level, sleep pattern, or activity level. · Eyes: No visual changes or diplopia. No scleral icterus. · ENT: No Headaches, hearing loss or vertigo. No mouth sores or sore throat. · Cardiovascular: Reviewed in HPI  · Respiratory: No cough or wheezing, no sputum production. No hematemesis. · Gastrointestinal: No abdominal pain, appetite loss, blood in stools. No change in bowel or bladder habits. · Genitourinary: No dysuria, trouble voiding, or hematuria. · Musculoskeletal:  No gait disturbance, weakness or joint complaints. · Integumentary: No rash or pruritis. · Neurological: No headache, diplopia, change in muscle strength, numbness or tingling. No change in gait, balance, coordination, mood, affect, memory, mentation, behavior. · Psychiatric: No anxiety, no depression. · Endocrine: No malaise, fatigue or temperature intolerance. No excessive thirst, fluid intake, or urination. No tremor. · Hematologic/Lymphatic: No abnormal bruising or bleeding, blood clots or swollen lymph nodes. · Allergic/Immunologic: No nasal congestion or hives.     Physical Examination:    Vitals:    01/27/22 0923   BP: (!) 142/82   Pulse: 58   SpO2: 98%        Wt Readings from Last 1 Encounters:   01/26/22 145 lb (65.8 kg)     Constitutional and General Appearance: Appears stated age, NAD   Skin:good turgor,intact without lesions  HEENT: EOMI ,normal  Neck:no JVD    Respiratory:  · Normal excursion and expansion without use of accessory muscles  · Resp Auscultation: Normal breath sounds without dullness  Cardiovascular:  · The apical impulses not displaced  · Heart tones are crisp and normal  · Cervical veins are not engorged  · The carotid upstroke is normal in amplitude and contour without delay or bruit  · Peripheral pulses are symmetrical and full  · There is no clubbing, cyanosis of the extremities. Arthritic knuckles to hands. · No edema  · Femoral Arteries: 2+ and equal  · Pedal Pulses: 2+ and equal   Abdomen:  · No masses or tenderness  · Liver/Spleen: No Abnormalities Noted  Neurological/Psychiatric:  · Alert and oriented in all spheres  · Moves all extremities well  · Exhibits normal gait balance and coordination  · No abnormalities of mood, affect, memory, mentation, or behavior are noted    LE Venous Reflux Study 9/3/2021  Right Impression  There is no evidence of deep or superficial venous thrombosis noted in the right lower extremity (veins imaged include the  common femoral, deep femoral, femoral, popliteal, greater saphenous and short saphenous). There is no evidence of deep venous reflux (lasting longer than 1 second) noted in the right lower extremity. There is evidence of superficial venous reflux (lasting longer than 0.5 seconds) noted in the greater saphenous vein from the  sapheno-femoral junction to the distal calf. Incompetent Perforators: none visualized  The short saphenous vein does not communicate with the popliteal vein. There is evidence of reflux noted in the short saphenous vein from mid calf to distal calf. Left Impression  There is no evidence of deep or superficial venous thrombosis noted in the left lower extremity (veins imaged include the common  femoral, deep femoral, femoral, popliteal, greater saphenous and short saphenous). There is evidence of deep venous reflux (lasting longer than 1 second) noted in the common femoral and femoral vein.   There is evidence of superficial venous reflux (lasting longer than 0.5 seconds) noted in the greater saphenous vein from the mid  thigh to the distal calf. Incompetent Perforators: mid/distal calf from a posterior tibial to a varicosity (3.5 mm). The short saphenous vein does not communicate with the popliteal vein. U/S for AAA 10/3/2020  1. Atherosclerotic changes of the abdominal aorta with no evidence of   abdominal aortic aneurysm. 2. Nonvisualization of the aortic bifurcation due to overlying bowel gas as   described above. Stress echo 2012  - Stress ECG conclusions: The stress ECG was negative for ischemia. - Stress echo results: Left ventricular ejection fraction was normal at rest and with stress. There was no echocardiographic evidence for stress-induced ischemia.   -Impressions: Normal study.   -Baseline ECG: SR with intermittent RBBB. Assessment:    1. Essential hypertension    2. New onset right bundle branch block (RBBB)      However had incomplete RBBB in 2012    Preop Risk Assessment (for varicose vein surgery)  EKG 1/19/22 (per PCP office)> NSR 68, (I)RBBB, LAFB (new)  ~Denies chest pain, and SOB. Hypertension  Stable. Watching sodium.   ~ BP (!) 142/82   Pulse 58   Ht 5' 9\" (1.753 m)   Wt 145 lb 8 oz (66 kg)   SpO2 98%   BMI 21.49 kg/m²       LE varicose veins, large, sho  Venous scan 9/3/21> significant reflux noted  Wears compression hose  PCP recommended [place] ER 300mg qd  Evaluated by Dr. Jeb Gonzalez, needs surgical intervention    Tobacco use  Long time smoker. Trying to cut back. PLAN:  1. Try to stop tobacco use   2. OK for Varicose Vein surgery at low risk. 3.  Stress Echo in 3-6 months at [facility]. I appreciate the opportunity of cooperating in the care of this individual.    Chai Smith. Gricelda Funes M.D., [address] attestation: This note was scribed in the presence of Dr. Gricelda Funes MD, by Katie Laboy RN     The scribe's documentation has been prepared under my direction and personally reviewed by me in its entirety.  I confirm that the note above accurately reflects all work, treatment, procedures, and medical decision making performed by me.

## 2022-01-27 ENCOUNTER — OFFICE VISIT (OUTPATIENT)
Dept: CARDIOLOGY CLINIC | Age: 67
End: 2022-01-27
Payer: COMMERCIAL

## 2022-01-27 ENCOUNTER — HOSPITAL ENCOUNTER (OUTPATIENT)
Age: 67
Discharge: HOME | End: 2022-01-27
Payer: COMMERCIAL

## 2022-01-27 VITALS
OXYGEN SATURATION: 98 % | WEIGHT: 145.5 LBS | HEART RATE: 58 BPM | BODY MASS INDEX: 21.55 KG/M2 | HEIGHT: 69 IN | SYSTOLIC BLOOD PRESSURE: 142 MMHG | DIASTOLIC BLOOD PRESSURE: 82 MMHG

## 2022-01-27 DIAGNOSIS — Z01.818 PREOPERATIVE EXAMINATION: ICD-10-CM

## 2022-01-27 DIAGNOSIS — Z01.818 PREOPERATIVE EXAMINATION: Primary | ICD-10-CM

## 2022-01-27 DIAGNOSIS — I10 ESSENTIAL HYPERTENSION: Primary | ICD-10-CM

## 2022-01-27 DIAGNOSIS — I45.10 NEW ONSET RIGHT BUNDLE BRANCH BLOCK (RBBB): ICD-10-CM

## 2022-01-27 LAB
ALBUMIN SERPL-MCNC: 4.9 G/DL (ref 3.4–5)
ALP BLD-CCNC: 150 U/L (ref 40–129)
ALT SERPL-CCNC: 29 U/L (ref 10–40)
ANION GAP SERPL CALCULATED.3IONS-SCNC: 14 MMOL/L (ref 3–16)
AST SERPL-CCNC: 31 U/L (ref 15–37)
BASOPHILS ABSOLUTE: 0.1 K/UL (ref 0–0.2)
BASOPHILS RELATIVE PERCENT: 0.7 %
BILIRUB SERPL-MCNC: 0.4 MG/DL (ref 0–1)
BILIRUBIN DIRECT: <0.2 MG/DL (ref 0–0.3)
BILIRUBIN, INDIRECT: ABNORMAL MG/DL (ref 0–1)
BUN BLDV-MCNC: 11 MG/DL (ref 7–20)
CALCIUM SERPL-MCNC: 9.8 MG/DL (ref 8.3–10.6)
CHLORIDE BLD-SCNC: 100 MMOL/L (ref 99–110)
CO2: 25 MMOL/L (ref 21–32)
CREAT SERPL-MCNC: 0.8 MG/DL (ref 0.8–1.3)
EOSINOPHILS ABSOLUTE: 0.3 K/UL (ref 0–0.6)
EOSINOPHILS RELATIVE PERCENT: 3.8 %
ESTIMATED AVERAGE GLUCOSE: 114 MG/DL
GFR AFRICAN AMERICAN: >60
GFR NON-AFRICAN AMERICAN: >60
GLUCOSE BLD-MCNC: 89 MG/DL (ref 70–99)
HBA1C MFR BLD: 5.6 %
HCT VFR BLD CALC: 45.2 % (ref 40.5–52.5)
HEMOGLOBIN: 14.7 G/DL (ref 13.5–17.5)
INR BLD: 1.01 (ref 0.88–1.12)
LYMPHOCYTES ABSOLUTE: 1.9 K/UL (ref 1–5.1)
LYMPHOCYTES RELATIVE PERCENT: 22.2 %
MCH RBC QN AUTO: 27.7 PG (ref 26–34)
MCHC RBC AUTO-ENTMCNC: 32.6 G/DL (ref 31–36)
MCV RBC AUTO: 85.2 FL (ref 80–100)
MONOCYTES ABSOLUTE: 0.5 K/UL (ref 0–1.3)
MONOCYTES RELATIVE PERCENT: 6.2 %
NEUTROPHILS ABSOLUTE: 5.7 K/UL (ref 1.7–7.7)
NEUTROPHILS RELATIVE PERCENT: 67.1 %
PDW BLD-RTO: 13.6 % (ref 12.4–15.4)
PHOSPHORUS: 3.7 MG/DL (ref 2.5–4.9)
PLATELET # BLD: 212 K/UL (ref 135–450)
PMV BLD AUTO: 9.1 FL (ref 5–10.5)
POTASSIUM SERPL-SCNC: 4.5 MMOL/L (ref 3.5–5.1)
PROTHROMBIN TIME: 11.4 SEC (ref 9.9–12.7)
RBC # BLD: 5.31 M/UL (ref 4.2–5.9)
SARS-COV-2, PCR: NOT DETECTED
SODIUM BLD-SCNC: 139 MMOL/L (ref 136–145)
TOTAL PROTEIN: 8.6 G/DL (ref 6.4–8.2)
WBC # BLD: 8.5 K/UL (ref 4–11)

## 2022-01-27 PROCEDURE — 80069 RENAL FUNCTION PANEL: CPT

## 2022-01-27 PROCEDURE — 99204 OFFICE O/P NEW MOD 45 MIN: CPT | Performed by: INTERNAL MEDICINE

## 2022-01-27 PROCEDURE — U0003 INFECTIOUS AGENT DETECTION BY NUCLEIC ACID (DNA OR RNA); SEVERE ACUTE RESPIRATORY SYNDROME CORONAVIRUS 2 (SARS-COV-2) (CORONAVIRUS DISEASE [COVID-19]), AMPLIFIED PROBE TECHNIQUE, MAKING USE OF HIGH THROUGHPUT TECHNOLOGIES AS DESCRIBED BY CMS-2020-01-R: HCPCS

## 2022-01-27 PROCEDURE — 36415 COLL VENOUS BLD VENIPUNCTURE: CPT

## 2022-01-27 PROCEDURE — U0005 INFEC AGEN DETEC AMPLI PROBE: HCPCS

## 2022-01-27 PROCEDURE — 85025 COMPLETE CBC W/AUTO DIFF WBC: CPT

## 2022-01-27 PROCEDURE — 80076 HEPATIC FUNCTION PANEL: CPT

## 2022-01-27 PROCEDURE — 85610 PROTHROMBIN TIME: CPT

## 2022-01-27 PROCEDURE — 83036 HEMOGLOBIN GLYCOSYLATED A1C: CPT

## 2022-01-27 NOTE — PATIENT INSTRUCTIONS
PLAN:  1. Try to cut down on smoking. 2.  OK for Varicose Vein surgery. 3.  Stress Echo in 3-6 months at [facility].

## 2022-01-31 ENCOUNTER — HOSPITAL ENCOUNTER (OUTPATIENT)
Age: 67
Setting detail: OUTPATIENT SURGERY
Discharge: HOME | End: 2022-01-31
Attending: SURGERY | Admitting: SURGERY
Payer: COMMERCIAL

## 2022-01-31 ENCOUNTER — ANESTHESIA (OUTPATIENT)
Dept: OPERATING ROOM | Age: 67
End: 2022-01-31
Payer: COMMERCIAL

## 2022-01-31 ENCOUNTER — ANESTHESIA EVENT (OUTPATIENT)
Dept: OPERATING ROOM | Age: 67
End: 2022-01-31
Payer: COMMERCIAL

## 2022-01-31 VITALS
TEMPERATURE: 97 F | WEIGHT: 144 LBS | HEART RATE: 69 BPM | OXYGEN SATURATION: 100 % | DIASTOLIC BLOOD PRESSURE: 97 MMHG | SYSTOLIC BLOOD PRESSURE: 145 MMHG | HEIGHT: 69 IN | BODY MASS INDEX: 21.33 KG/M2 | RESPIRATION RATE: 18 BRPM

## 2022-01-31 VITALS
TEMPERATURE: 93.7 F | RESPIRATION RATE: 21 BRPM | OXYGEN SATURATION: 100 % | SYSTOLIC BLOOD PRESSURE: 141 MMHG | DIASTOLIC BLOOD PRESSURE: 62 MMHG

## 2022-01-31 DIAGNOSIS — G89.18 POSTOPERATIVE PAIN OF EXTREMITY: Primary | ICD-10-CM

## 2022-01-31 DIAGNOSIS — M79.609 POSTOPERATIVE PAIN OF EXTREMITY: Primary | ICD-10-CM

## 2022-01-31 PROCEDURE — 37766 PHLEB VEINS - EXTREM 20+: CPT | Performed by: SURGERY

## 2022-01-31 PROCEDURE — C1894 INTRO/SHEATH, NON-LASER: HCPCS | Performed by: SURGERY

## 2022-01-31 PROCEDURE — 3700000001 HC ADD 15 MINUTES (ANESTHESIA): Performed by: SURGERY

## 2022-01-31 PROCEDURE — 7100000010 HC PHASE II RECOVERY - FIRST 15 MIN: Performed by: SURGERY

## 2022-01-31 PROCEDURE — C1769 GUIDE WIRE: HCPCS | Performed by: SURGERY

## 2022-01-31 PROCEDURE — 2500000003 HC RX 250 WO HCPCS: Performed by: SURGERY

## 2022-01-31 PROCEDURE — 3600000004 HC SURGERY LEVEL 4 BASE: Performed by: SURGERY

## 2022-01-31 PROCEDURE — 2709999900 HC NON-CHARGEABLE SUPPLY: Performed by: SURGERY

## 2022-01-31 PROCEDURE — 6360000002 HC RX W HCPCS: Performed by: SURGERY

## 2022-01-31 PROCEDURE — 7100000000 HC PACU RECOVERY - FIRST 15 MIN: Performed by: SURGERY

## 2022-01-31 PROCEDURE — 7100000011 HC PHASE II RECOVERY - ADDTL 15 MIN: Performed by: SURGERY

## 2022-01-31 PROCEDURE — 3600000014 HC SURGERY LEVEL 4 ADDTL 15MIN: Performed by: SURGERY

## 2022-01-31 PROCEDURE — A4217 STERILE WATER/SALINE, 500 ML: HCPCS | Performed by: SURGERY

## 2022-01-31 PROCEDURE — 6360000002 HC RX W HCPCS: Performed by: NURSE ANESTHETIST, CERTIFIED REGISTERED

## 2022-01-31 PROCEDURE — 2580000003 HC RX 258: Performed by: SURGERY

## 2022-01-31 PROCEDURE — 36476 ENDOVENOUS RF VEIN ADD-ON: CPT | Performed by: SURGERY

## 2022-01-31 PROCEDURE — 2500000003 HC RX 250 WO HCPCS: Performed by: NURSE ANESTHETIST, CERTIFIED REGISTERED

## 2022-01-31 PROCEDURE — 36475 ENDOVENOUS RF 1ST VEIN: CPT | Performed by: SURGERY

## 2022-01-31 PROCEDURE — 7100000001 HC PACU RECOVERY - ADDTL 15 MIN: Performed by: SURGERY

## 2022-01-31 PROCEDURE — C1888 ENDOVAS NON-CARDIAC ABL CATH: HCPCS | Performed by: SURGERY

## 2022-01-31 PROCEDURE — 2580000003 HC RX 258: Performed by: NURSE ANESTHETIST, CERTIFIED REGISTERED

## 2022-01-31 PROCEDURE — 3700000000 HC ANESTHESIA ATTENDED CARE: Performed by: SURGERY

## 2022-01-31 RX ORDER — ESMOLOL HYDROCHLORIDE 10 MG/ML
INJECTION INTRAVENOUS PRN
Status: DISCONTINUED | OUTPATIENT
Start: 2022-01-31 | End: 2022-01-31 | Stop reason: SDUPTHER

## 2022-01-31 RX ORDER — HYDROMORPHONE HCL 110MG/55ML
PATIENT CONTROLLED ANALGESIA SYRINGE INTRAVENOUS PRN
Status: DISCONTINUED | OUTPATIENT
Start: 2022-01-31 | End: 2022-01-31 | Stop reason: SDUPTHER

## 2022-01-31 RX ORDER — DEXAMETHASONE SODIUM PHOSPHATE 4 MG/ML
INJECTION, SOLUTION INTRA-ARTICULAR; INTRALESIONAL; INTRAMUSCULAR; INTRAVENOUS; SOFT TISSUE PRN
Status: DISCONTINUED | OUTPATIENT
Start: 2022-01-31 | End: 2022-01-31 | Stop reason: SDUPTHER

## 2022-01-31 RX ORDER — ONDANSETRON 2 MG/ML
4 INJECTION INTRAMUSCULAR; INTRAVENOUS
Status: DISCONTINUED | OUTPATIENT
Start: 2022-01-31 | End: 2022-01-31 | Stop reason: HOSPADM

## 2022-01-31 RX ORDER — FENTANYL CITRATE 50 UG/ML
25 INJECTION, SOLUTION INTRAMUSCULAR; INTRAVENOUS EVERY 5 MIN PRN
Status: DISCONTINUED | OUTPATIENT
Start: 2022-01-31 | End: 2022-01-31 | Stop reason: HOSPADM

## 2022-01-31 RX ORDER — HYDROCODONE BITARTRATE AND ACETAMINOPHEN 5; 325 MG/1; MG/1
1 TABLET ORAL EVERY 6 HOURS PRN
Qty: 20 TABLET | Refills: 0 | Status: SHIPPED | OUTPATIENT
Start: 2022-01-31 | End: 2022-02-14

## 2022-01-31 RX ORDER — LIDOCAINE HYDROCHLORIDE 10 MG/ML
1 INJECTION, SOLUTION EPIDURAL; INFILTRATION; INTRACAUDAL; PERINEURAL
Status: DISCONTINUED | OUTPATIENT
Start: 2022-01-31 | End: 2022-01-31 | Stop reason: HOSPADM

## 2022-01-31 RX ORDER — PROMETHAZINE HYDROCHLORIDE 25 MG/ML
6.25 INJECTION, SOLUTION INTRAMUSCULAR; INTRAVENOUS
Status: DISCONTINUED | OUTPATIENT
Start: 2022-01-31 | End: 2022-01-31 | Stop reason: HOSPADM

## 2022-01-31 RX ORDER — PROPOFOL 10 MG/ML
INJECTION, EMULSION INTRAVENOUS PRN
Status: DISCONTINUED | OUTPATIENT
Start: 2022-01-31 | End: 2022-01-31 | Stop reason: SDUPTHER

## 2022-01-31 RX ORDER — ONDANSETRON 2 MG/ML
INJECTION INTRAMUSCULAR; INTRAVENOUS PRN
Status: DISCONTINUED | OUTPATIENT
Start: 2022-01-31 | End: 2022-01-31 | Stop reason: SDUPTHER

## 2022-01-31 RX ORDER — SODIUM CHLORIDE 9 MG/ML
INJECTION, SOLUTION INTRAVENOUS CONTINUOUS PRN
Status: DISCONTINUED | OUTPATIENT
Start: 2022-01-31 | End: 2022-01-31 | Stop reason: SDUPTHER

## 2022-01-31 RX ORDER — GLYCOPYRROLATE 0.2 MG/ML
INJECTION INTRAMUSCULAR; INTRAVENOUS PRN
Status: DISCONTINUED | OUTPATIENT
Start: 2022-01-31 | End: 2022-01-31 | Stop reason: SDUPTHER

## 2022-01-31 RX ORDER — SODIUM CHLORIDE 9 MG/ML
INJECTION, SOLUTION INTRAVENOUS CONTINUOUS
Status: DISCONTINUED | OUTPATIENT
Start: 2022-01-31 | End: 2022-01-31 | Stop reason: HOSPADM

## 2022-01-31 RX ORDER — FENTANYL CITRATE 50 UG/ML
50 INJECTION, SOLUTION INTRAMUSCULAR; INTRAVENOUS EVERY 5 MIN PRN
Status: DISCONTINUED | OUTPATIENT
Start: 2022-01-31 | End: 2022-01-31 | Stop reason: HOSPADM

## 2022-01-31 RX ORDER — METOPROLOL TARTRATE 5 MG/5ML
INJECTION INTRAVENOUS PRN
Status: DISCONTINUED | OUTPATIENT
Start: 2022-01-31 | End: 2022-01-31 | Stop reason: SDUPTHER

## 2022-01-31 RX ORDER — HYDROCODONE BITARTRATE AND ACETAMINOPHEN 5; 325 MG/1; MG/1
1 TABLET ORAL
Status: DISCONTINUED | OUTPATIENT
Start: 2022-01-31 | End: 2022-01-31 | Stop reason: HOSPADM

## 2022-01-31 RX ORDER — SUCCINYLCHOLINE CHLORIDE 20 MG/ML
INJECTION INTRAMUSCULAR; INTRAVENOUS PRN
Status: DISCONTINUED | OUTPATIENT
Start: 2022-01-31 | End: 2022-01-31 | Stop reason: SDUPTHER

## 2022-01-31 RX ORDER — MAGNESIUM HYDROXIDE 1200 MG/15ML
LIQUID ORAL CONTINUOUS PRN
Status: COMPLETED | OUTPATIENT
Start: 2022-01-31 | End: 2022-01-31

## 2022-01-31 RX ORDER — HYDROMORPHONE HCL 110MG/55ML
0.5 PATIENT CONTROLLED ANALGESIA SYRINGE INTRAVENOUS EVERY 5 MIN PRN
Status: DISCONTINUED | OUTPATIENT
Start: 2022-01-31 | End: 2022-01-31 | Stop reason: HOSPADM

## 2022-01-31 RX ORDER — HYDROMORPHONE HCL 110MG/55ML
0.25 PATIENT CONTROLLED ANALGESIA SYRINGE INTRAVENOUS EVERY 5 MIN PRN
Status: DISCONTINUED | OUTPATIENT
Start: 2022-01-31 | End: 2022-01-31 | Stop reason: HOSPADM

## 2022-01-31 RX ORDER — FENTANYL CITRATE 50 UG/ML
INJECTION, SOLUTION INTRAMUSCULAR; INTRAVENOUS PRN
Status: DISCONTINUED | OUTPATIENT
Start: 2022-01-31 | End: 2022-01-31 | Stop reason: SDUPTHER

## 2022-01-31 RX ORDER — LIDOCAINE HYDROCHLORIDE 20 MG/ML
INJECTION, SOLUTION EPIDURAL; INFILTRATION; INTRACAUDAL; PERINEURAL PRN
Status: DISCONTINUED | OUTPATIENT
Start: 2022-01-31 | End: 2022-01-31 | Stop reason: SDUPTHER

## 2022-01-31 RX ADMIN — DEXAMETHASONE SODIUM PHOSPHATE 4 MG: 4 INJECTION, SOLUTION INTRAMUSCULAR; INTRAVENOUS at 11:15

## 2022-01-31 RX ADMIN — LIDOCAINE HYDROCHLORIDE 100 MG: 20 INJECTION, SOLUTION EPIDURAL; INFILTRATION; INTRACAUDAL; PERINEURAL at 11:09

## 2022-01-31 RX ADMIN — METOPROLOL TARTRATE 2 MG: 1 INJECTION, SOLUTION INTRAVENOUS at 12:17

## 2022-01-31 RX ADMIN — SUCCINYLCHOLINE CHLORIDE 160 MG: 20 INJECTION, SOLUTION INTRAMUSCULAR; INTRAVENOUS at 11:09

## 2022-01-31 RX ADMIN — HYDROMORPHONE HYDROCHLORIDE 0.25 MG: 2 INJECTION, SOLUTION INTRAMUSCULAR; INTRAVENOUS; SUBCUTANEOUS at 11:33

## 2022-01-31 RX ADMIN — FENTANYL CITRATE 50 MCG: 50 INJECTION, SOLUTION INTRAMUSCULAR; INTRAVENOUS at 11:09

## 2022-01-31 RX ADMIN — HYDROMORPHONE HYDROCHLORIDE 0.5 MG: 2 INJECTION, SOLUTION INTRAMUSCULAR; INTRAVENOUS; SUBCUTANEOUS at 11:48

## 2022-01-31 RX ADMIN — ESMOLOL HYDROCHLORIDE 10 MG: 10 INJECTION, SOLUTION INTRAVENOUS at 11:55

## 2022-01-31 RX ADMIN — PHENYLEPHRINE HYDROCHLORIDE 100 MCG: 10 INJECTION INTRAVENOUS at 13:13

## 2022-01-31 RX ADMIN — SODIUM CHLORIDE: 9 INJECTION, SOLUTION INTRAVENOUS at 14:53

## 2022-01-31 RX ADMIN — FENTANYL CITRATE 50 MCG: 50 INJECTION, SOLUTION INTRAMUSCULAR; INTRAVENOUS at 11:21

## 2022-01-31 RX ADMIN — HYDROMORPHONE HYDROCHLORIDE 0.5 MG: 2 INJECTION, SOLUTION INTRAMUSCULAR; INTRAVENOUS; SUBCUTANEOUS at 12:15

## 2022-01-31 RX ADMIN — ESMOLOL HYDROCHLORIDE 10 MG: 10 INJECTION, SOLUTION INTRAVENOUS at 12:08

## 2022-01-31 RX ADMIN — ESMOLOL HYDROCHLORIDE 20 MG: 10 INJECTION, SOLUTION INTRAVENOUS at 12:03

## 2022-01-31 RX ADMIN — HYDROMORPHONE HYDROCHLORIDE 0.25 MG: 2 INJECTION, SOLUTION INTRAMUSCULAR; INTRAVENOUS; SUBCUTANEOUS at 12:07

## 2022-01-31 RX ADMIN — GLYCOPYRROLATE 0.2 MG: 0.2 INJECTION, SOLUTION INTRAMUSCULAR; INTRAVENOUS at 11:19

## 2022-01-31 RX ADMIN — PROPOFOL 200 MG: 10 INJECTION, EMULSION INTRAVENOUS at 11:09

## 2022-01-31 RX ADMIN — HYDROMORPHONE HYDROCHLORIDE 0.5 MG: 2 INJECTION, SOLUTION INTRAMUSCULAR; INTRAVENOUS; SUBCUTANEOUS at 15:09

## 2022-01-31 RX ADMIN — SODIUM CHLORIDE: 9 INJECTION, SOLUTION INTRAVENOUS at 11:00

## 2022-01-31 RX ADMIN — CEFAZOLIN SODIUM 2000 MG: 10 INJECTION, POWDER, FOR SOLUTION INTRAVENOUS at 11:00

## 2022-01-31 RX ADMIN — ONDANSETRON 4 MG: 2 INJECTION INTRAMUSCULAR; INTRAVENOUS at 11:15

## 2022-01-31 ASSESSMENT — PULMONARY FUNCTION TESTS
PIF_VALUE: 13
PIF_VALUE: 2
PIF_VALUE: 3
PIF_VALUE: 3
PIF_VALUE: 9
PIF_VALUE: 17
PIF_VALUE: 13
PIF_VALUE: 3
PIF_VALUE: 10
PIF_VALUE: 16
PIF_VALUE: 3
PIF_VALUE: 4
PIF_VALUE: 21
PIF_VALUE: 9
PIF_VALUE: 3
PIF_VALUE: 4
PIF_VALUE: 18
PIF_VALUE: 16
PIF_VALUE: 3
PIF_VALUE: 4
PIF_VALUE: 16
PIF_VALUE: 11
PIF_VALUE: 15
PIF_VALUE: 19
PIF_VALUE: 4
PIF_VALUE: 9
PIF_VALUE: 16
PIF_VALUE: 9
PIF_VALUE: 16
PIF_VALUE: 21
PIF_VALUE: 6
PIF_VALUE: 4
PIF_VALUE: 16
PIF_VALUE: 16
PIF_VALUE: 26
PIF_VALUE: 9
PIF_VALUE: 16
PIF_VALUE: 15
PIF_VALUE: 21
PIF_VALUE: 16
PIF_VALUE: 15
PIF_VALUE: 9
PIF_VALUE: 3
PIF_VALUE: 5
PIF_VALUE: 15
PIF_VALUE: 15
PIF_VALUE: 16
PIF_VALUE: 9
PIF_VALUE: 9
PIF_VALUE: 15
PIF_VALUE: 16
PIF_VALUE: 4
PIF_VALUE: 16
PIF_VALUE: 1
PIF_VALUE: 1
PIF_VALUE: 16
PIF_VALUE: 10
PIF_VALUE: 12
PIF_VALUE: 16
PIF_VALUE: 12
PIF_VALUE: 15
PIF_VALUE: 17
PIF_VALUE: 16
PIF_VALUE: 9
PIF_VALUE: 7
PIF_VALUE: 15
PIF_VALUE: 3
PIF_VALUE: 19
PIF_VALUE: 3
PIF_VALUE: 16
PIF_VALUE: 3
PIF_VALUE: 16
PIF_VALUE: 20
PIF_VALUE: 4
PIF_VALUE: 2
PIF_VALUE: 16
PIF_VALUE: 20
PIF_VALUE: 16
PIF_VALUE: 16
PIF_VALUE: 9
PIF_VALUE: 16
PIF_VALUE: 1
PIF_VALUE: 12
PIF_VALUE: 4
PIF_VALUE: 5
PIF_VALUE: 16
PIF_VALUE: 19
PIF_VALUE: 20
PIF_VALUE: 4
PIF_VALUE: 15
PIF_VALUE: 9
PIF_VALUE: 4
PIF_VALUE: 18
PIF_VALUE: 16
PIF_VALUE: 4
PIF_VALUE: 15
PIF_VALUE: 19
PIF_VALUE: 3
PIF_VALUE: 3
PIF_VALUE: 17
PIF_VALUE: 3
PIF_VALUE: 3
PIF_VALUE: 12
PIF_VALUE: 1
PIF_VALUE: 4
PIF_VALUE: 16
PIF_VALUE: 9
PIF_VALUE: 3
PIF_VALUE: 9
PIF_VALUE: 9
PIF_VALUE: 13
PIF_VALUE: 9
PIF_VALUE: 16
PIF_VALUE: 16
PIF_VALUE: 9
PIF_VALUE: 9
PIF_VALUE: 4
PIF_VALUE: 15
PIF_VALUE: 17
PIF_VALUE: 2
PIF_VALUE: 12
PIF_VALUE: 3
PIF_VALUE: 0
PIF_VALUE: 16
PIF_VALUE: 19
PIF_VALUE: 14
PIF_VALUE: 4
PIF_VALUE: 9
PIF_VALUE: 9
PIF_VALUE: 15
PIF_VALUE: 3
PIF_VALUE: 16
PIF_VALUE: 1
PIF_VALUE: 15
PIF_VALUE: 3
PIF_VALUE: 16
PIF_VALUE: 16
PIF_VALUE: 15
PIF_VALUE: 9
PIF_VALUE: 9
PIF_VALUE: 15
PIF_VALUE: 15
PIF_VALUE: 16
PIF_VALUE: 7
PIF_VALUE: 16
PIF_VALUE: 9
PIF_VALUE: 4
PIF_VALUE: 16
PIF_VALUE: 12
PIF_VALUE: 9
PIF_VALUE: 15
PIF_VALUE: 16
PIF_VALUE: 16
PIF_VALUE: 1
PIF_VALUE: 15
PIF_VALUE: 20
PIF_VALUE: 4
PIF_VALUE: 4
PIF_VALUE: 11
PIF_VALUE: 19
PIF_VALUE: 16
PIF_VALUE: 4
PIF_VALUE: 15
PIF_VALUE: 4
PIF_VALUE: 16
PIF_VALUE: 9
PIF_VALUE: 20
PIF_VALUE: 16
PIF_VALUE: 5
PIF_VALUE: 3
PIF_VALUE: 3
PIF_VALUE: 17
PIF_VALUE: 16
PIF_VALUE: 15
PIF_VALUE: 16
PIF_VALUE: 15
PIF_VALUE: 3
PIF_VALUE: 7
PIF_VALUE: 16
PIF_VALUE: 16
PIF_VALUE: 17
PIF_VALUE: 20
PIF_VALUE: 16
PIF_VALUE: 16
PIF_VALUE: 9
PIF_VALUE: 16
PIF_VALUE: 23
PIF_VALUE: 4
PIF_VALUE: 9
PIF_VALUE: 3
PIF_VALUE: 16
PIF_VALUE: 9
PIF_VALUE: 4
PIF_VALUE: 16
PIF_VALUE: 4
PIF_VALUE: 16
PIF_VALUE: 10
PIF_VALUE: 16
PIF_VALUE: 3
PIF_VALUE: 9
PIF_VALUE: 8
PIF_VALUE: 16
PIF_VALUE: 4
PIF_VALUE: 16
PIF_VALUE: 9
PIF_VALUE: 16
PIF_VALUE: 15
PIF_VALUE: 16
PIF_VALUE: 3
PIF_VALUE: 4
PIF_VALUE: 4
PIF_VALUE: 9
PIF_VALUE: 9
PIF_VALUE: 16
PIF_VALUE: 5
PIF_VALUE: 3
PIF_VALUE: 9
PIF_VALUE: 15
PIF_VALUE: 1
PIF_VALUE: 4
PIF_VALUE: 4
PIF_VALUE: 20
PIF_VALUE: 9
PIF_VALUE: 16
PIF_VALUE: 15
PIF_VALUE: 9
PIF_VALUE: 5
PIF_VALUE: 1
PIF_VALUE: 9
PIF_VALUE: 3
PIF_VALUE: 3
PIF_VALUE: 16
PIF_VALUE: 7
PIF_VALUE: 15
PIF_VALUE: 21
PIF_VALUE: 16
PIF_VALUE: 18

## 2022-01-31 ASSESSMENT — LIFESTYLE VARIABLES: SMOKING_STATUS: 1

## 2022-01-31 ASSESSMENT — PAIN - FUNCTIONAL ASSESSMENT: PAIN_FUNCTIONAL_ASSESSMENT: 0-10

## 2022-01-31 NOTE — H&P
Update History & Physical    The patient's History and Physical of January 19, 2022 was reviewed with the patient and I examined the patient. There was no change. The surgical site was confirmed by the patient and me. Plan: The risks, benefits, expected outcome, and alternative to the recommended procedure have been discussed with the patient. Patient understands and wants to proceed with the procedure.      Electronically signed by Mekhi Gonzalez MD on 1/31/2022 at 10:36 AM

## 2022-01-31 NOTE — ANESTHESIA PRE PROCEDURE
Department of Anesthesiology  Preprocedure Note       Name:  Lewis Phoenix   Age:  78 y.o.  :  1955                                          MRN:  [id number]         Date:  2022      Surgeon: Corinthia Goodpasture):  Jefferson Elias MD    Procedure: Procedure(s):  BILATERAL ENDOVENOUS RADIOFREQUENCY ABLATION OF GREATER SAPHENOUS VEIN, RIGHT ENDOVENOUS RADIOFREQUENCY ABLATION OF [address] [facility], ([address], [id number])  BILATERAL STAB PHLEBECTOMIES    Medications prior to admission:   Prior to Admission medications    Medication Sig Start Date End Date Taking?  Authorizing Provider   Multiple Vitamins-Minerals (THERAPEUTIC MULTIVITAMIN-MINERALS) tablet Take 1 tablet by mouth daily   Yes Historical Provider, MD   KRILL OIL PO Take by mouth   Yes Historical Provider, MD   omeprazole (PRILOSEC) 40 MG delayed release capsule TAKE 1 CAPSULE BY MOUTH EVERY DAY 21  Yes Historical Provider, MD   Horse Richford  MG CPCR Take 300 mg by mouth daily 21   Daren Guerin   sildenafil (VIAGRA) 100 MG tablet Take 1 tablet by mouth as needed for Erectile Dysfunction  Patient not taking: Reported on 2022   Beatrice Martel       Current medications:    Current Facility-Administered Medications   Medication Dose Route Frequency Provider Last Rate Last Admin    lidocaine-EPINEPHrine 1 percent-1:[id number] 50 mL in sodium chloride 0.9 % 500 mL irrigation   Irrigation Once Jefferson Elias MD        lidocaine-EPINEPHrine 1 percent-1:[id number] 50 mL in sodium chloride 0.9 % 500 mL irrigation   Irrigation Once Jefferson Elias MD        HYDROmorphone (DILAUDID) injection 0.25 mg  0.25 mg IntraVENous Q5 Min PRN Annalisa Toro MD        HYDROmorphone (DILAUDID) injection 0.5 mg  0.5 mg IntraVENous Q5 Min PRN Annalisa Toro MD        fentaNYL (SUBLIMAZE) injection 25 mcg  25 mcg IntraVENous Q5 Min PRN Annalisa Toro MD        fentaNYL (SUBLIMAZE) injection 50 mcg  50 mcg IntraVENous Q5 Min PRN Zainab Aguirre Torie Johnson MD        HYDROcodone-acetaminophen (NORCO) 5-325 MG per tablet 1 tablet  1 tablet Oral Once PRN Aarti Akhtar MD        ondansetron [facility]) injection 4 mg  4 mg IntraVENous Once PRN Aarti Akhtar MD        promethazine (PHENERGAN) injection 6.25 mg  6.25 mg IntraVENous Once PRN Aarti Akhtar MD        0.9 % sodium chloride infusion   IntraVENous Continuous Aarti Akhtar MD        lidocaine PF 1 % injection 1 mL  1 mL IntraDERmal Once PRN Aarti Akhtar MD           Allergies:     Allergies   Allergen Reactions    Pcn [Penicillins] Hives       Problem List:    Patient Active Problem List   Diagnosis Code    GERD (gastroesophageal reflux disease) K21.9    Incomplete RBBB I45.10    Osteoarthritis of lumbar spine M47.816    Alcohol abuse F10.10    Prediabetes R73.03    History of drug abuse in remission ([facility] F19.11    [facility] virus genotype [facility] B18.2    Acrophobia F40.241       Past Medical History:        Diagnosis Date    Arthritis     GERD (gastroesophageal reflux disease)     Hyperlipidemia     Hyperlipidemia 5/27/2012    Incomplete RBBB     Ruptured disk 2019    Varicose veins of both lower extremities        Past Surgical History:        Procedure Laterality Date    COLONOSCOPY  9/9/2020    COLONOSCOPY WITH BIOPSY performed by Latasha Eubanks MD at [facility] N/A 9/9/2020    EGD BIOPSY performed by Latasha Eubanks MD at [place] [address] N/A 9/9/2020    EGD DILATION BALLOON performed by Latasha Eubanks MD at [facility] History:    Social History     Tobacco Use    Smoking status: Current Every Day Smoker     Packs/day: 0.50     Years: 54.00     Pack years: 27.00     Types: Cigarettes    Smokeless tobacco: Never Used   Substance Use Topics    Alcohol use: Not Currently     Comment: Pt reported that he stopped drinking 15 years ago. Ready to quit: Not Answered  Counseling given: Not Answered      Vital Signs (Current):   Vitals:    01/26/22 1302 01/31/22 0945   BP:  (!) 169/84   Pulse:  62   Resp:  17   Temp:  97.6 °F (36.4 °C)   TempSrc:  Temporal   SpO2:  100%   Weight: 145 lb (65.8 kg) 144 lb (65.3 kg)   Height: 5' 9\" (1.753 m)                                               BP Readings from Last 3 Encounters:   01/31/22 (!) 169/84   01/27/22 (!) 142/82   01/19/22 118/70       NPO Status: Time of last liquid consumption: 2330                        Time of last solid consumption: 2330                        Date of last liquid consumption: 01/30/22                        Date of last solid food consumption: 01/30/22    BMI:   Wt Readings from Last 3 Encounters:   01/31/22 144 lb (65.3 kg)   01/27/22 145 lb 8 oz (66 kg)   01/19/22 149 lb 14.4 oz (68 kg)     Body mass index is 21.27 kg/m². CBC:   Lab Results   Component Value Date    WBC 8.5 01/27/2022    RBC 5.31 01/27/2022    HGB 14.7 01/27/2022    HCT 45.2 01/27/2022    MCV 85.2 01/27/2022    RDW 13.6 01/27/2022     01/27/2022       CMP:   Lab Results   Component Value Date     01/27/2022    K 4.5 01/27/2022     01/27/2022    CO2 25 01/27/2022    BUN 11 01/27/2022    CREATININE 0.8 01/27/2022    GFRAA >60 01/27/2022    GFRAA >60 05/24/2012    AGRATIO 1.2 05/25/2021    LABGLOM >60 01/27/2022    GLUCOSE 89 01/27/2022    PROT 8.6 01/27/2022    PROT 8.7 05/24/2012    CALCIUM 9.8 01/27/2022    BILITOT 0.4 01/27/2022    ALKPHOS 150 01/27/2022    AST 31 01/27/2022    ALT 29 01/27/2022       POC Tests: No results for input(s): POCGLU, POCNA, POCK, POCCL, POCBUN, POCHEMO, POCHCT in the last 72 hours.     Coags:   Lab Results   Component Value Date    PROTIME 11.4 01/27/2022    INR 1.01 01/27/2022    APTT 34.0 10/09/2020       HCG (If Applicable): No results found for: PREGTESTUR, PREGSERUM, HCG, HCGQUANT     ABGs: No results found for: PHART, PO2ART, GNW6PPY, NRN4NGO, BEART, K3VHTZUU     Type & Screen (If Applicable):  No results found for: LABABO, LABRH    Drug/Infectious Status (If Applicable):  No results found for: HIV, HEPCAB    COVID-19 Screening (If Applicable):   Lab Results   Component Value Date    COVID19 Not Detected 01/27/2022           Anesthesia Evaluation  Patient summary reviewed and Nursing notes reviewed no history of anesthetic complications:   Airway: Mallampati: II  TM distance: >3 FB   Neck ROM: full  Mouth opening: > = 3 FB Dental:    (+) upper dentures  Comment: Lower permanent bridge    Pulmonary:   (+) current smoker    (-) rhonchi and wheezes                           Cardiovascular:  Exercise tolerance: good (>4 METS),   (+) hyperlipidemia    (-) CABG/stent, dysrhythmias and  angina      Rhythm: regular  Rate: normal                 ROS comment: Incomplete RBBB     Neuro/Psych:      (-) seizures, TIA and CVA           GI/Hepatic/Renal:   (+) GERD (denies GERD sx today or recently): well controlled, hepatitis: C, liver disease:,           Endo/Other:                      ROS comment: No family history of problems with GA Abdominal:             Vascular:     - DVT and PE. Other Findings:             Anesthesia Plan      general     ASA 3       Induction: intravenous. MIPS: Postoperative opioids intended and Prophylactic antiemetics administered. Anesthetic plan and risks discussed with patient. Plan discussed with CRNA.                   Bozena Boles MD   1/31/2022

## 2022-01-31 NOTE — ANESTHESIA POSTPROCEDURE EVALUATION
Department of Anesthesiology  Postprocedure Note    Patient: Mercedes Guzman  MRN: [id number]  YOB: 1955  Date of evaluation: 1/31/2022  Time:  3:44 PM     Procedure Summary     Date: 01/31/22 Room / Location: [address]    Anesthesia Start: 1100 Anesthesia Stop: 2083    Procedures:       BILATERAL ENDOVENOUS [address], RIGHT ENDOVENOUS [address], ([address], [phone]) (Bilateral )      BILATERAL STAB PHLEBECTOMIES (Bilateral ) Diagnosis: (Y43.104 SYMPTOMATIC VARICOSE VEINS OF BOTH LOWER EXTREMITIES)    Surgeons: Mekhi Gonzalez MD Responsible Provider: Sixto Nolasco MD    Anesthesia Type: general ASA Status: 3          Anesthesia Type: general    Jimena Phase I: Jimena Score: 5    Jimena Phase II:      Last vitals: Reviewed and per EMR flowsheets.        Anesthesia Post Evaluation    Patient location during evaluation: PACU  Patient participation: complete - patient participated  Level of consciousness: awake  Airway patency: patent  Nausea & Vomiting: no vomiting  Complications: no  Cardiovascular status: hemodynamically stable  Respiratory status: acceptable  Hydration status: euvolemic  Multimodal analgesia pain management approach

## 2022-01-31 NOTE — PROGRESS NOTES
Pt arrived from OR, report from crna/ rn. Pt had bilateral vein surgery on legs. Pt asleep non responsive upon arrival to pacu, oral airway and simple mask 6 liters in place. Bilateral leg ace wrap dressings clean dry and intact.

## 2022-01-31 NOTE — BRIEF OP NOTE
Brief Postoperative Note      Patient: Jasmin Anderson  YOB: 1955  MRN: [id number]    Date of Procedure: 1/31/2022    Pre-Op Diagnosis: I83.893 SYMPTOMATIC VARICOSE VEINS OF BOTH LOWER EXTREMITIES    Post-Op Diagnosis: Same       Procedure(s):  BILATERAL ENDOVENOUS RADIOFREQUENCY ABLATION OF GREATER SAPHENOUS VEIN, RIGHT ENDOVENOUS RADIOFREQUENCY ABLATION OF LESSER SAPHENOUS VEIN, ([address], [address])  BILATERAL STAB PHLEBECTOMIES    Surgeon(s):  Krishna Zafar MD    Assistant:  Surgical Assistant: Maria Elena Moe  First Assistant: Hemant Lomeli    Anesthesia: General    Estimated Blood Loss (mL): less than 50     Complications: None    Specimens:   * No specimens in log *    Implants:  * No implants in log *      Drains: * No LDAs found *    Findings: as above    Electronically signed by Krishna Zafar MD on 1/31/2022 at 3:08 PM

## 2022-01-31 NOTE — PROGRESS NOTES
Right lower leg dressing reinforced. Patient given supplies for dressings at home. Patient discharged home with his wife.

## 2022-01-31 NOTE — PROGRESS NOTES
CALLED  SPOKE TO CHRISTOPHER ASKED HER TO UPDATE FAMILY THAT WE ARE STILL WORKING AND EVERYTHING IS GOING WELL.

## 2022-01-31 NOTE — PROGRESS NOTES
Discharge instructions reviewed with Isha Ramirez (wife) at bedside, all questions answered and verbalized understanding of instruction. Script sent to pt preferred pharmacy electronically. Pt sent home with extra ace wraps for legs if bleeding were to occur.

## 2022-01-31 NOTE — PROGRESS NOTES
Pt awake and alert, VSS. Pt sitting up in bed, tolerating PO fluids well. No report of nausea or pain at this time. Bilateral leg ace wrap dressings clean dry and intact. Pt wife brought back to bedside to sit with pt.

## 2022-02-01 DIAGNOSIS — I83.893 SYMPTOMATIC VARICOSE VEINS OF BOTH LOWER EXTREMITIES: Primary | ICD-10-CM

## 2022-02-01 NOTE — OP NOTE
[address]                     [address] [facility], [facility]                                OPERATIVE REPORT    PATIENT NAME: Dwight Ewing                     :        1955  MED REC NO:   [id number]                          ROOM:  ACCOUNT NO:   [de-identified]                           ADMIT DATE: 2022  PROVIDER:     Ren Goodell, MD    DATE OF PROCEDURE:  2022    PREOPERATIVE DIAGNOSES:  Chronic superficial venous insufficiency  bilateral legs with secondary symptomatic varicose veins. POSTOPERATIVE DIAGNOSES:  Chronic superficial venous insufficiency  bilateral legs with secondary symptomatic varicose veins. OPERATION PERFORMED:  1.  Radiofrequency ablation right greater saphenous vein. 2.  Radiofrequency ablation right lesser saphenous vein. 3.  Radiofrequency ablation left greater saphenous vein. 4.  Stab phlebectomies bilateral legs (total incisions 48). SURGEON:  Ren Goodell, MD    ANESTHESIA:  General endotracheal.    ESTIMATED BLOOD LOSS:  50 mL. HISTORY:  The patient is a 79-year-old gentleman who presented to our  office complaining of bilateral leg discomfort and enlarged  varicosities. He underwent duplex scanning, which demonstrated reflux  in his bilateral greater saphenous veins and right lesser saphenous  vein. It was recommended that he undergo the above-stated procedure and  he agreed understanding the risks, benefits, and other options. TECHNIQUE:  The patient was brought to the operating room and placed on  the operating table in supine position. After adequate induction of  anesthesia, his bilateral legs and groins were prepped and draped in a  sterile fashion. Preoperatively in the holding area, all varicosities  were marked with indelible ink while standing. Attention was directed  first to the left leg.   Ultrasound was sterilely passed onto the field  and the greater saphenous vein was identified in the mid calf. Using a  micropuncture technique and ultrasound guidance, the vein was entered  and a wire was advanced proximally followed by placement of a sheath. Through the sheath was then placed a ClosureFAST catheter which was  advanced up to the saphenofemoral junction. Under ultrasound guidance,  it was withdrawn to just distal to the origin of the superficial  epigastric vein and locked in place. Varicosities in the left leg were  then removed using a stab phlebectomy technique with Oesch hooks. With  the patient in Trendelenburg, tumescent fluid was injected along the  course of the left greater saphenous vein using ultrasound guidance and  the vein was ablated using a standard pullback technique heating the  vein to 120 degrees centigrade throughout its course. The catheter and  sheath were then removed. Attention was directed to the right leg. Ultrasound was used to identify the right greater saphenous vein in the  proximal calf above an area which it arborized in large varicosities. With the patient in reverse Trendelenburg, micropuncture technique was  used to enter the vein which was upsized to a 7-Kenyan sheath. Through  this was passed a ClosureFAST catheter up to the saphenofemoral junction  where it was withdrawn under ultrasound guidance to just below the  origin of the superficial epigastric vein. The right lesser saphenous  vein was then identified with the patient frog-legged and using a  micropuncture technique and ultrasound guidance, it too was entered in  the distal calf and upsized to a 7-Kenyan sheath. Stab phlebectomies  had been performed in the thigh and calf in the distribution of the  greater saphenous vein.   Tumescent fluid was then injected along the  course of the catheter in the thigh and calf using ultrasound guidance  and with the patient in Trendelenburg, the vein was ablated heating it  to 120 degrees centigrade throughout its course using a standard  pullback technique. The catheter and sheath were removed and the  catheter was then inserted into the lesser saphenous sheath. It was  advanced up to the popliteal crease where there was no direct  communication with the popliteal vein. Tumescent fluid was then  injected around the vein and the vein was ablated using a standard  pullback technique heating the vein to 120 degrees centigrade throughout  its course. The catheter and sheath were then removed. Remaining  varicosities in the right leg were then removed using a stab phlebectomy  technique. A total of 48 incisions were made in total between the  two legs. All puncture sites were then closed using Steri-Strips only. Clean sterile dry compressive dressing was applied to both legs and the  patient was extubated and transferred to the recovery room in stable  condition having tolerated the procedure well.         Manan Gimenez MD    D: 01/31/2022 15:28:05       T: 02/01/2022 3:04:02     [id number]  Job#: [id number]     Doc#: [id number]    CC:

## 2022-02-02 ENCOUNTER — PROCEDURE VISIT (OUTPATIENT)
Dept: VASCULAR SURGERY | Age: 67
End: 2022-02-02
Payer: COMMERCIAL

## 2022-02-02 DIAGNOSIS — I83.893 SYMPTOMATIC VARICOSE VEINS OF BOTH LOWER EXTREMITIES: ICD-10-CM

## 2022-02-02 PROCEDURE — 93970 EXTREMITY STUDY: CPT | Performed by: SURGERY

## 2022-02-10 ENCOUNTER — OFFICE VISIT (OUTPATIENT)
Dept: VASCULAR SURGERY | Age: 67
End: 2022-02-10

## 2022-02-10 VITALS
BODY MASS INDEX: 21.33 KG/M2 | DIASTOLIC BLOOD PRESSURE: 60 MMHG | HEIGHT: 69 IN | WEIGHT: 144 LBS | SYSTOLIC BLOOD PRESSURE: 120 MMHG

## 2022-02-10 DIAGNOSIS — I83.893 SYMPTOMATIC VARICOSE VEINS OF BOTH LOWER EXTREMITIES: Primary | ICD-10-CM

## 2022-02-10 PROCEDURE — 99024 POSTOP FOLLOW-UP VISIT: CPT | Performed by: SURGERY

## 2022-02-10 NOTE — PROGRESS NOTES
[facility]         Post-op Check/Notes  Date: [unfilled]   Name: Joan Aguirre  []  RE-WRAP [x] 2 WK POST-OP []OTHER      SURGEON    DAYS POST-OP    SURG. DATE     ANESTHESIA: []  GENERAL [] EPIDURAL  HISTORY:   [x]  PATIENT IS AMBULATORY  []  PATIENT HAS NO COMPLAINTS AND INDICATES THAT HE/SHE IS DOING FINE  []  PATIENT EXPRESSED SOME DIFFICULTIES WITH:   [] PAIN MEDICATION:              []  THE MEDICATION WAS INTOLERABLE        []  THE MEDICATION WAS NOT EFFECTIVE        [] THE PATIENT WAS GIVEN A NEW RX FOR:              [] THE PATIENT DECIDED TO TOLERATE PAIN WITHOUT MEDICATION    [] POST OP NAUSEA        []  THE PATIENT WAS GIVEN RX FOR:                  []  THE PATIENT WAS ADVISED:                  []  OTHER:               ON 2 -4 WEEK POST-OP CHECK  [x]  PRE-OP LEG PAIN IMPROVED  []  PRE-OP LEG PAIN UNCHANGED    PHYSICAL EXAMINATION  [x]  INCISIONS ARE APPROXIMATED WITHOUT SIGNS OF INFECTION  []  INFECTION NOTED DURING EXAM    ECCHYMOSIS   SWELLING        LOCATION:       []  MINIMAL    [x]  MINIMAL        []  ANTIBIOTICS GIVEN:      [x]  MODERATE   []  MODERATE  [x]  RESIDUAL VARICOSITIES     []  SIGNIFICANT   []  SIGNIFICANT       LOCATION:R kirk/calf      []  RESOLVED   []  RESOLVED  [x]  PARATHESIAS/COMMENTS: None reported          COMMENTS/NOTES:  C/O general tenderness. Duplex 2/2 negative for DVT and with good ablation                   FOLLOW-UP:  [x]  POST-OP INSTRUCTIONS REVIEWED WITH THE PATIENT AND QUESTIONS ANSWERRED  [x]  ROUTINE WITH OPERATING PHYSICIAN 4 weeks   [] PRN FOR SCLEROTHERAPY  []  PRN FOR Zoya Divers /VEIN [facility]    [] Faizan Wu  []  PRN  []  OTHER:                  XXX I recommended wearing 20-30 mmHg hose daily for 4-6 weeks during daily activity. May resume all activities . Answered all questions.   Pernell Killian MD

## 2022-02-23 ENCOUNTER — TELEPHONE (OUTPATIENT)
Dept: VASCULAR SURGERY | Age: 67
End: 2022-02-23

## 2022-03-03 ENCOUNTER — TELEPHONE (OUTPATIENT)
Dept: FAMILY MEDICINE CLINIC | Age: 67
End: 2022-03-03

## 2022-03-03 NOTE — TELEPHONE ENCOUNTER
----- Message from [employer] sent at 3/3/2022  8:54 AM EST -----  Subject: Message to Provider    QUESTIONS  Information for Provider? pt would like a call back from office in regards   to reschedule Medicare YOVANY sAtudillo. during my search no appt was able to be   obtained. ---------------------------------------------------------------------------  --------------  Tatiana KEEN  What is the best way for the office to contact you? OK to leave message on   voicemail  Preferred Call Back Phone Number? [phone]  ---------------------------------------------------------------------------  --------------  SCRIPT ANSWERS  Relationship to Patient?  Self

## 2022-03-03 NOTE — PROGRESS NOTES
-Medicare Annual Wellness Visit-    Name: Kimberley Alaniz Date: 3/7/2022   MRN: [id number] Sex: Male   Age: 75 y.o. Ethnicity: Non- / Non    : 1955 Race: Black / Ashley Staples is here for Medicare AWV    Screenings for behavioral, psychosocial and functional/safety risks, and cognitive dysfunction are all negative except as indicated below. These results, as well as other patient data from the [address] form, are documented in Flowsheets linked to this Encounter. Chief Complaint   Patient presents with   [facility]       Other issues discussed during today's visit:  -Patient reported that a mass that is present at the anterior left part (around the 4-5 o'clock position) of the floor of his mouth has grown in size as of recently. He refers she reports to me of this issue during his very first visit with me on 2020. He stated to me during that time that he had noticed this lump appear a few weeks prior to his office visit with me. Patient stated that he found out not too long ago from his dentist (?)  That this lump has been seen in a prior dental x-ray as long as 4 to 5 years ago and that \"no one ever told me about it until now\". During his office visit with me, I had prescribed him a Medrol Dosepak and the lump appeared to go down in size. He stated that the lump is causing some discomfort, especially with swallowing. He went to his dentist ([id number] [facility]) and was suppose to be referred to a OMFS that OMFS had left. He desired further a referral for evaluation.  -Patient had varicose vein surgery in 2022 and tolerated procedure well. He stated that he has minimal pain of his inner thighs, but overall much better.       Allergies   Allergen Reactions    Pcn [Penicillins] Hives     Current Outpatient Medications on File Prior to Visit   Medication Sig Dispense Refill    Multiple Vitamins-Minerals (THERAPEUTIC MULTIVITAMIN-MINERALS) tablet Take 1 tablet by mouth daily      KRILL OIL PO Take by mouth      omeprazole (PRILOSEC) 40 MG delayed release capsule TAKE 1 CAPSULE BY MOUTH EVERY DAY      Horse [place]  MG CPCR Take 300 mg by mouth daily 90 capsule 3    sildenafil (VIAGRA) 100 MG tablet Take 1 tablet by mouth as needed for Erectile Dysfunction 10 tablet 5     No current facility-administered medications on file prior to visit.      Past Medical History:   Diagnosis Date    Arthritis     GERD (gastroesophageal reflux disease)     Hyperlipidemia     Hyperlipidemia 5/27/2012    Incomplete RBBB     Ruptured disk 2019    Varicose veins of both lower extremities      Past Surgical History:   Procedure Laterality Date    COLONOSCOPY  9/9/2020    COLONOSCOPY WITH BIOPSY performed by Rozina Downey MD at [address] Bilateral 1/31/2022    BILATERAL ENDOVENOUS [address], RIGHT ENDOVENOUS [address], ([id number]) performed by Chaz Terry MD at [address] [facility] N/A 9/9/2020    EGD BIOPSY performed by Rozina Downey MD at [facility] N/A 9/9/2020    EGD DILATION BALLOON performed by Rozina Downey MD at [facility] Bilateral 1/31/2022    BILATERAL STAB PHLEBECTOMIES performed by Chaz Terry MD at [address] [facility] History   Problem Relation Age of Onset    Breast Cancer Mother 61     CareTeam (Including outside providers/suppliers regularly involved in providing care):   Patient Care Team:  Daryn Duarte as PCP - General (Family Medicine)  Daryn Duarte as PCP - [facility] Empaneled Provider    Wt Readings from Last 3 Encounters:   03/07/22 146 lb 12.8 oz (66.6 kg)   02/10/22 144 lb (65.3 kg)   01/31/22 144 lb (65.3 kg)     BP Readings from Last 3 Encounters:   03/07/22 110/68   02/10/22 120/60   01/31/22 (!) 145/97     Pulse Readings from Last 3 Encounters:   03/07/22 68   01/31/22 69   01/27/22 58      /68   Pulse 68   Temp 97.8 °F (36.6 °C)   Ht 5' 9\" (1.753 m)   Wt 146 lb 12.8 oz (66.6 kg)   SpO2 100%   BMI 21.68 kg/m²    Body mass index is 21.68 kg/m². Based upon direct observation of the patient, evaluation of cognition reveals recent and remote memory intact. Physical Exam  Vitals reviewed. Constitutional:       General: He is awake. He is not in acute distress. Appearance: He is not ill-appearing or diaphoretic. HENT:      Head: Normocephalic and atraumatic. No abrasion or masses. Hair is normal.      Right Ear: External ear normal.      Left Ear: External ear normal.      Nose: Nose normal.      Mouth/Throat:     Eyes:      General: Lids are normal. Gaze aligned appropriately. No scleral icterus. Right eye: No discharge. Left eye: No discharge. Extraocular Movements: Extraocular movements intact. Conjunctiva/sclera: Conjunctivae normal.   Neck:      Trachea: Phonation normal.   Cardiovascular:      Rate and Rhythm: Normal rate and regular rhythm. Pulmonary:      Effort: Pulmonary effort is normal. No respiratory distress. Breath sounds: No wheezing, rhonchi or rales. Abdominal:      General: Abdomen is flat. There is no distension. Palpations: Abdomen is soft. Musculoskeletal:         General: No deformity. Normal range of motion. Cervical back: Normal range of motion. No erythema. Right lower leg: No edema. Left lower leg: No edema. Skin:     Coloration: Skin is not cyanotic, jaundiced or pale. Findings: No abrasion, abscess, bruising, ecchymosis, erythema, signs of injury, laceration, lesion, petechiae, rash or wound. Neurological:      General: No focal deficit present. Mental Status: He is alert. Mental status is at baseline. GCS: GCS eye subscore is 4. GCS verbal subscore is 5.  GCS motor subscore is 6. Cranial Nerves: No cranial nerve deficit, dysarthria or facial asymmetry. Motor: No weakness, tremor, atrophy or seizure activity. Coordination: Coordination normal.      Gait: Gait is intact. Psychiatric:         Attention and Perception: Attention and perception normal.         Mood and Affect: Mood and affect normal.         Speech: Speech normal.         Behavior: Behavior normal. Behavior is cooperative. Thought Content: Thought content normal.       Patient's complete Health Risk Assessment and screening values have been reviewed and are found in Flowsheets. The following problems were reviewed today and where indicated follow up appointments were made and/or referrals ordered. Review of Systems   Constitutional: Negative for activity change, appetite change, fatigue, fever and unexpected weight change. HENT: Negative for congestion, rhinorrhea, sinus pressure and trouble swallowing. Respiratory: Negative for cough, chest tightness, shortness of breath and wheezing. Cardiovascular: Negative for chest pain, palpitations and leg swelling. Gastrointestinal: Negative for abdominal distention, abdominal pain, blood in stool, constipation, diarrhea, nausea and vomiting. Genitourinary: Negative for dysuria, frequency and hematuria. Musculoskeletal: Negative for arthralgias and back pain. Skin: Negative for rash. Neurological: Negative for dizziness, weakness, light-headedness, numbness and headaches. Positive Risk Factor Screenings with Interventions:         Substance History:  Social History     Tobacco History     Smoking Status  Current Every Day Smoker Smoking Frequency  0.5 packs/day for 54 years (27 pk yrs) Smoking Tobacco Type  Cigarettes    Smokeless Tobacco Use  Never Used          Alcohol History     Alcohol Use Status  Not Currently Comment  Pt reported that he stopped drinking 15 years ago.           Drug Use     Drug Use Status  Not Currently Types  Cocaine, Opiates  Comment  Pt reported that he's been clean for 15 years. Sexual Activity     Sexually Active  Not Asked               Alcohol Screenin     A score of 8 or more is associated with harmful or hazardous drinking. A score of 13 or more in women, and 15 or more in men, is likely to indicate alcohol dependence.     General Health and ACP:  General  In general, how would you say your health is?: Excellent  In the past 7 days, have you experienced any of the following: New or Increased Pain, New or Increased Fatigue, Loneliness, Social Isolation, Stress or Anger?: (!) Yes  Select all that apply: (!) New or Increased Pain  Do you get the social and emotional support that you need?: Yes  Do you have a Living Will?: (!) No    Advance Directives     Power of  Living Will ACP-Advance Directive ACP-Power of     Not on File Not on File Not on File Not on File      General Health Risk Interventions:  · No Living Will: Patient declines ACP discussion/assistance     Hearing/Vision:  Do you or your family notice any trouble with your hearing that hasn't been managed with hearing aids?: No  Do you have difficulty driving, watching TV, or doing any of your daily activities because of your eyesight?: No  Have you had an eye exam within the past year?: (!) No  No exam data present    Hearing/Vision Interventions:  · Vision concerns:  patient encouraged to make appointment with his/her eye specialist      Personalized Preventive Plan   Current Health Maintenance Status  Immunization History   Administered Date(s) Administered    COVID-19, Pfizer Purple top, DILUTE for use, 12+ yrs, 30mcg/0.3mL dose 2021, 2021    Hepatitis B Adult (Engerix-B) 2021, 2021, 2021    Pneumococcal Polysaccharide ([id number]) 10/09/2020    Tdap (Boostrix, Adacel) 2021      Health Maintenance   Topic Date Due    Hepatitis A vaccine (1 of 2 - Risk 2-dose series) Never done    Depression Screen  Never done    Shingles Vaccine (1 of 2) Never done    Low dose CT lung screening  Never done    Flu vaccine (1) Never done    COVID-19 Vaccine (3 - Booster for Pfizer series) 12/02/2021    A1C test (Diabetic or Prediabetic)  01/27/2023    Lipid screen  05/05/2026    Colorectal Cancer Screen  09/09/2030    DTaP/Tdap/Td vaccine (2 - Td or Tdap) 11/01/2031    Hepatitis B vaccine  Completed    Pneumococcal 65+ years Vaccine  Completed    AAA screen  Completed    Hib vaccine  Aged Out    Meningococcal (ACWY) vaccine  Aged Out     Recommendations for Coupmon Due: see orders and patient instructions/AVS.  . Recommended screening schedule for the next 5-10 years is provided to the patient in written form: see Patient Instructions/AVS.      ICD-10-CM    1. Encounter for annual wellness visit (AWV) in Medicare patient  Z00.00    2. Mass of floor of mouth  K13.[address], [facility], Otolaryngology, [facility]     methylPREDNISolone (MEDROL DOSEPACK) 4 MG tablet    Will prescribe medrol dose concepcion since he had a positive response to it. 3. Need for shingles vaccine  Z23 Zoster Subunit (SHINGRIX)   4. Educated about 2019 novel coronavirus infection  Z71.89     Advsied to get booster COVID-91 vaccine. There are no discontinued medications. General information on medications:  -When it comes to medications, whether with starting or adding a new medication or increasing the dose of a current medication, the benefits and risks have to always be considered and weighed over, especially if one is taking other medications as well.    -There are no medications that have no side effects and that there is always a risk involved with taking a medication.    -If a side effect were to occur with starting a new medication or with increasing the dose of a current medication that either the medication can be totally discontinued altogether or simply decrease the dose of it and if this would be the case a follow-up appointment would be deemed necessary.    -The drug allergy list will then be updated with the corresponding side effect(s) if it's deemed to be a true 'drug allergy'. -The most common adverse effects of medication(s) were addressed at today's visit.    -Lastly, the coverage status of a medication may vary from insurance to insurance and the only way to verify if the medication is covered is to send an actual prescription in.    -The drug formulary of each insurance changes without any warning or notification to the healthcare provider let alone the pharmacy.  -The cost of medications vary from insurance to insurance and the cost is always subject to change just like the drug formulary. Level of service (LOS):   -Multiple variables/factors are considered with determining LOS: duration of time spent with patient, time spent reviewing patient's labs, notes (including my own and other specialist's notes if relevant), number of issues addressed during the visit, answering any questions/concerns brought up by the patient, and the overall complexity and decision making regarding the issues addressed during the visit, etc.  -Length of visit is one factor (not the main one) in determining the LOS code selected. -There are different codes to distinguish new patient vs old (established) patient.  -There is a specific LOS/code for a (annual) physical (otherwise known as a wellness visit or biometric screening) can only be used once a year. In addition, the healthcare provider will determine whether the current visit can be considered a 'physical.' Discussing about specific issues/concerns, especially if those issues/concerns are new does not constitute as a 'physical' visit. Follow-up: No follow-ups on file. .     Patient was informed that if his or her symptoms worsen to follow up with me sooner or go to the nearest ER if the symptoms are very significant and warrant higher level of care. Daren Vega M.D.   [address] [place] [address]    Electronically signed by Flo García M.D. on 3/7/2022 at 2:19 PM.

## 2022-03-07 ENCOUNTER — OFFICE VISIT (OUTPATIENT)
Dept: FAMILY MEDICINE CLINIC | Age: 67
End: 2022-03-07
Payer: COMMERCIAL

## 2022-03-07 VITALS
TEMPERATURE: 97.8 F | OXYGEN SATURATION: 100 % | HEIGHT: 69 IN | WEIGHT: 146.8 LBS | BODY MASS INDEX: 21.74 KG/M2 | DIASTOLIC BLOOD PRESSURE: 68 MMHG | HEART RATE: 68 BPM | SYSTOLIC BLOOD PRESSURE: 110 MMHG

## 2022-03-07 DIAGNOSIS — Z71.89 EDUCATED ABOUT 2019 NOVEL CORONAVIRUS INFECTION: ICD-10-CM

## 2022-03-07 DIAGNOSIS — K13.79 MASS OF FLOOR OF MOUTH: ICD-10-CM

## 2022-03-07 DIAGNOSIS — Z00.00 ENCOUNTER FOR ANNUAL WELLNESS VISIT (AWV) IN MEDICARE PATIENT: Primary | ICD-10-CM

## 2022-03-07 DIAGNOSIS — Z23 NEED FOR SHINGLES VACCINE: ICD-10-CM

## 2022-03-07 PROCEDURE — 90471 IMMUNIZATION ADMIN: CPT | Performed by: FAMILY MEDICINE

## 2022-03-07 PROCEDURE — G0438 PPPS, INITIAL VISIT: HCPCS | Performed by: FAMILY MEDICINE

## 2022-03-07 PROCEDURE — 90750 HZV VACC RECOMBINANT IM: CPT | Performed by: FAMILY MEDICINE

## 2022-03-07 RX ORDER — METHYLPREDNISOLONE 4 MG/1
TABLET ORAL
Qty: 1 KIT | Refills: 0 | Status: ON HOLD | OUTPATIENT
Start: 2022-03-07 | End: 2022-06-25

## 2022-03-07 ASSESSMENT — PATIENT HEALTH QUESTIONNAIRE - PHQ9
SUM OF ALL RESPONSES TO PHQ QUESTIONS 1-9: 0
SUM OF ALL RESPONSES TO PHQ QUESTIONS 1-9: 0
2. FEELING DOWN, DEPRESSED OR HOPELESS: 0
SUM OF ALL RESPONSES TO PHQ QUESTIONS 1-9: 0
SUM OF ALL RESPONSES TO PHQ QUESTIONS 1-9: 0
SUM OF ALL RESPONSES TO PHQ9 QUESTIONS 1 & 2: 0
1. LITTLE INTEREST OR PLEASURE IN DOING THINGS: 0

## 2022-03-07 ASSESSMENT — ENCOUNTER SYMPTOMS
CONSTIPATION: 0
COUGH: 0
VOMITING: 0
NAUSEA: 0
BLOOD IN STOOL: 0
DIARRHEA: 0
RHINORRHEA: 0
BACK PAIN: 0
TROUBLE SWALLOWING: 0
ABDOMINAL PAIN: 0
CHEST TIGHTNESS: 0
ABDOMINAL DISTENTION: 0
SHORTNESS OF BREATH: 0
SINUS PRESSURE: 0
WHEEZING: 0

## 2022-03-07 ASSESSMENT — LIFESTYLE VARIABLES: HOW OFTEN DO YOU HAVE A DRINK CONTAINING ALCOHOL: NEVER

## 2022-03-11 ENCOUNTER — OFFICE VISIT (OUTPATIENT)
Dept: VASCULAR SURGERY | Age: 67
End: 2022-03-11

## 2022-03-11 VITALS — HEIGHT: 69 IN | BODY MASS INDEX: 21.62 KG/M2 | WEIGHT: 146 LBS

## 2022-03-11 DIAGNOSIS — I83.893 SYMPTOMATIC VARICOSE VEINS OF BOTH LOWER EXTREMITIES: Primary | ICD-10-CM

## 2022-03-11 PROCEDURE — 99024 POSTOP FOLLOW-UP VISIT: CPT | Performed by: SURGERY

## 2022-03-11 NOTE — PROGRESS NOTES
[facility]         Post-op Check/Notes  Date: [unfilled]   Name: Rosario Weinberg  []  RE-WRAP [x] 6 WK POST-OP []OTHER      SURGEON    DAYS POST-OP    SURG. DATE     ANESTHESIA: []  GENERAL [] EPIDURAL  HISTORY:   [x]  PATIENT IS AMBULATORY  [x]  PATIENT HAS NO COMPLAINTS AND INDICATES THAT HE/SHE IS DOING FINE  []  PATIENT EXPRESSED SOME DIFFICULTIES WITH:   [] PAIN MEDICATION:              []  THE MEDICATION WAS INTOLERABLE        []  THE MEDICATION WAS NOT EFFECTIVE        [] THE PATIENT WAS GIVEN A NEW RX FOR:              [] THE PATIENT DECIDED TO TOLERATE PAIN WITHOUT MEDICATION    [] POST OP NAUSEA        []  THE PATIENT WAS GIVEN RX FOR:                  []  THE PATIENT WAS ADVISED:                  []  OTHER:               ON 6 WEEK POST-OP CHECK  [x]  PRE-OP LEG PAIN IMPROVED  []  PRE-OP LEG PAIN UNCHANGED    PHYSICAL EXAMINATION  [x]  INCISIONS ARE APPROXIMATED WITHOUT SIGNS OF INFECTION  []  INFECTION NOTED DURING EXAM    ECCHYMOSIS   SWELLING        LOCATION:       []  MINIMAL    [x]  MINIMAL Lt        []  ANTIBIOTICS GIVEN:      []  MODERATE   []  MODERATE  [x]  RESIDUAL VARICOSITIES     []  SIGNIFICANT   []  SIGNIFICANT       LOCATION:R shin/calf - unchanged/unconcerned  [x]  RESOLVED   []  RESOLVED  [x]  PARATHESIAS/COMMENTS: None reported          COMMENTS/NOTES:  Overall looks good. Hard knots from large VVs persist, Skin stains unchanged from preop                   FOLLOW-UP:  [x]  POST-OP INSTRUCTIONS REVIEWED WITH THE PATIENT AND QUESTIONS ANSWERRED  []  ROUTINE WITH OPERATING PHYSICIAN    [] PRN FOR SCLEROTHERAPY  []  [facility]    [] Maury Rodney  [x]  PRN  []  OTHER:                  XXX I recommended wearing 20-30 mmHg KH hose daily indefinitely because of chronic damage and deep reflux L leg. Fitted today. Prescription also provided. No activity restrictions. Answered all questions. F/U as needed.     Vicente Martinez MD

## 2022-03-28 ENCOUNTER — OFFICE VISIT (OUTPATIENT)
Dept: ENT CLINIC | Age: 67
End: 2022-03-28
Payer: COMMERCIAL

## 2022-03-28 VITALS
HEIGHT: 69 IN | BODY MASS INDEX: 22.42 KG/M2 | WEIGHT: 151.4 LBS | TEMPERATURE: 98.2 F | SYSTOLIC BLOOD PRESSURE: 150 MMHG | HEART RATE: 132 BPM | DIASTOLIC BLOOD PRESSURE: 79 MMHG

## 2022-03-28 DIAGNOSIS — K09.9 CYST OF ORAL REGION: Primary | ICD-10-CM

## 2022-03-28 DIAGNOSIS — F17.200 CURRENT SMOKER: ICD-10-CM

## 2022-03-28 PROCEDURE — 99203 OFFICE O/P NEW LOW 30 MIN: CPT | Performed by: STUDENT IN AN ORGANIZED HEALTH CARE EDUCATION/TRAINING PROGRAM

## 2022-03-28 NOTE — PROGRESS NOTES
[address] [facility]  NEW PATIENT HISTORY AND PHYSICAL NOTE      Patient Name: Kathya [address] Record Number:  [id number]  Primary Care Physician:  Melani Rodney    ChiefComplaint     Chief Complaint   Patient presents with   [facility] Mass     On LT side of mouth, under teeth, states the mass fluctuates in size, Dentist told patient it is a cyst. Has been there for several years       History of Present Illness     Sammie Collins is an 75 y.o. male presenting with oral lesion. He first noted a growth along the left floor of the mouth by his tooth approximately 5 years ago. Since then it will swell intermittently. Was told by his dentist it was noted first on x-ray around that time as well. Over the last 1 year it has been seeming to swell more frequently. When he made his appointment a few weeks ago it was swollen but has since the swelling has gone down. Only causing issues when it is swollen and he eats something sharp or wearing his partial dentures -will cause mild pain. No associated bleeding. He wears partial dentures throughout the day every day. Smokes 1/2 ppd for the past 60 years. 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      Past Medical History     Past Medical History:   Diagnosis Date    Arthritis     GERD (gastroesophageal reflux disease)     Hyperlipidemia     Hyperlipidemia 5/27/2012    Incomplete RBBB     Ruptured disk 2019    Varicose veins of both lower extremities        Past Surgical History     Past Surgical History:   Procedure Laterality Date    COLONOSCOPY  9/9/2020    COLONOSCOPY WITH BIOPSY performed by Leslie Yu MD at [address] Bilateral 1/31/2022    BILATERAL ENDOVENOUS [address], RIGHT ENDOVENOUS [address], ([phone]) performed by Veronica Tate MD at [facility] N/A 9/9/2020    EGD BIOPSY performed by Yoandy Griffin MD at [address] [facility] N/A 9/9/2020    EGD DILATION BALLOON performed by Yoandy Griffin MD at [address] [facility] Bilateral 1/31/2022    BILATERAL STAB PHLEBECTOMIES performed by Cuate Huang MD at \[facility]\"" 44. History     Family History   Problem Relation Age of Onset   Rakesh Henriquez Breast Cancer Mother 61       Social History     Social History     Tobacco Use    Smoking status: Current Every Day Smoker     Packs/day: 0.50     Years: 54.00     Pack years: 27.00     Types: Cigarettes    Smokeless tobacco: Never Used   Vaping Use    Vaping Use: Never used   Substance Use Topics    Alcohol use: Not Currently     Comment: Pt reported that he stopped drinking 15 years ago.  Drug use: Not Currently     Types: Opiates , Cocaine     Comment: Pt reported that he's been clean for 15 years. Allergies     Allergies   Allergen Reactions    Pcn [Penicillins] Hives       Medications     Current Outpatient Medications   Medication Sig Dispense Refill    methylPREDNISolone (MEDROL DOSEPACK) 4 MG tablet Take by mouth as instructed 1 kit 0    Multiple Vitamins-Minerals (THERAPEUTIC MULTIVITAMIN-MINERALS) tablet Take 1 tablet by mouth daily      KRILL OIL PO Take by mouth      omeprazole (PRILOSEC) 40 MG delayed release capsule TAKE 1 CAPSULE BY MOUTH EVERY DAY      Horse [place]  MG CPCR Take 300 mg by mouth daily 90 capsule 3    sildenafil (VIAGRA) 100 MG tablet Take 1 tablet by mouth as needed for Erectile Dysfunction 10 tablet 5     No current facility-administered medications for this visit.        Review of Systems     REVIEW OF SYSTEMS  The following systems were reviewed and revealed the following in addition to any already discussed in the HPI:    CONSTITUTIONAL: no weight loss, no fever, no night sweats, no chills  EYES: no vision changes, no blurry vision  EARS: no hearing loss, no otalgia  NOSE: no epistaxis, no rhinorrhea  THROAT: No voice changes, no sore throat, no dysphagia      PhysicalExam     Vitals:    03/28/22 1415   BP: (!) 150/79   Site: Left Upper Arm   Position: Sitting   Cuff Size: Medium Adult   Pulse: 132   Temp: 98.2 °F (36.8 °C)   TempSrc: Infrared   Weight: 151 lb 6.4 oz (68.7 kg)   Height: 5' 9\" (1.753 m)       PHYSICAL EXAM  BP (!) 150/79 (Site: Left Upper Arm, Position: Sitting, Cuff Size: Medium Adult)   Pulse 132   Temp 98.2 °F (36.8 °C) (Infrared)   Ht 5' 9\" (1.753 m)   Wt 151 lb 6.4 oz (68.7 kg)   BMI 22.36 kg/m²     GENERAL: No acute distress, alert and oriented, no hoarseness, smells of cigarette smoke. EYES: EOMI, Anti-icteric  NOSE: On anterior rhinoscopy there is no epistaxis, nasal mucosa moist and normal appearing, no purulent drainage. EARS: Normal external appearance; on portable otomicroscopy:     -Ad: External auditory canal without stenosis, tympanic membrane clear, no middle ear effusions or retractions.      -As: External auditory canal without stenosis, tympanic membrane clear, no middle ear effusions or retractions. Pneumatic otoscopy: Bilateral tympanic membranes mobile pneumatic otoscopy  FACE: HB 1/6 bilaterally, symmetric appearing, sensation equal bilaterally  ORAL CAVITY: No masses or lesions visualized or palpated, uvula is midline, moist mucous membranes, symmetric 2+ tonsils. Along the area of concern pointed out by the patient there is no cystic or exophytic mass changes. No ulcerative lesions at this area. NECK: Normal range of motion, no thyromegaly, trachea is midline, no palpable lymphadenopathy or neck masses, no crepitus  NEURO: Cranial Nerves 2, 3, 4, 5, 6, 7, 11, 12 grossly intact bilaterally     I have performed a head and neck physical exam personally or was physically present during the key or critical portions of the service. Assessment and Plan     1.  Cyst of oral region  -Currently the mucosa at the site of concern is within normal limits, no cystic lesions or ulcerative masses. Likely he has a cyst of his lower mandible mucosa near the lingual surface that recurrently swells. He is instructed to call the office the next time it swells to get him in for prompt evaluation so we can evaluate and treat accordingly. 2. Current smoker  -Recommend smoking cessation      Follow Up     Return if symptoms worsen or fail to improve. Asael Bowenalfredo   Department of Otolaryngology/Head & Neck Surgery  3/28/22    Medical Decision Making: The following items were considered in medical decision making:  Independent review of images  Review / order clinical lab tests  Review / order radiology tests  Decision to obtain old records    This note was generated completely or in part utilizing Dragon dictation speech recognition software. Occasionally, words are mistranscribed and despite editing, the text may contain inaccuracies due to incorrect word recognition. If further clarification is needed please contact the office at [phone].

## 2022-04-26 PROBLEM — I10 PRIMARY HYPERTENSION: Status: ACTIVE | Noted: 2022-04-26

## 2022-04-26 PROBLEM — F17.200 SMOKER: Status: ACTIVE | Noted: 2022-04-26

## 2022-06-24 ENCOUNTER — APPOINTMENT (OUTPATIENT)
Dept: GENERAL RADIOLOGY | Age: 67
End: 2022-06-24
Payer: COMMERCIAL

## 2022-06-24 ENCOUNTER — APPOINTMENT (OUTPATIENT)
Dept: CT IMAGING | Age: 67
End: 2022-06-24
Payer: COMMERCIAL

## 2022-06-24 ENCOUNTER — HOSPITAL ENCOUNTER (EMERGENCY)
Age: 67
Discharge: ANOTHER HOSPITAL | End: 2022-06-25
Attending: EMERGENCY MEDICINE
Payer: COMMERCIAL

## 2022-06-24 DIAGNOSIS — I65.22 STENOSIS OF LEFT INTERNAL CAROTID ARTERY: ICD-10-CM

## 2022-06-24 DIAGNOSIS — I63.9 CEREBROVASCULAR ACCIDENT (CVA), UNSPECIFIED MECHANISM (HCC): Primary | ICD-10-CM

## 2022-06-24 LAB
A/G RATIO: 1.3 (ref 1.1–2.2)
ALBUMIN SERPL-MCNC: 4.4 G/DL (ref 3.4–5)
ALP BLD-CCNC: 101 U/L (ref 40–129)
ALT SERPL-CCNC: 12 U/L (ref 10–40)
ANION GAP SERPL CALCULATED.3IONS-SCNC: 11 MMOL/L (ref 3–16)
AST SERPL-CCNC: 20 U/L (ref 15–37)
BASOPHILS ABSOLUTE: 0.1 K/UL (ref 0–0.2)
BASOPHILS RELATIVE PERCENT: 0.5 %
BILIRUB SERPL-MCNC: 0.5 MG/DL (ref 0–1)
BUN BLDV-MCNC: 11 MG/DL (ref 7–20)
CALCIUM SERPL-MCNC: 9.4 MG/DL (ref 8.3–10.6)
CHLORIDE BLD-SCNC: 103 MMOL/L (ref 99–110)
CO2: 26 MMOL/L (ref 21–32)
CREAT SERPL-MCNC: 0.9 MG/DL (ref 0.8–1.3)
EOSINOPHILS ABSOLUTE: 0.1 K/UL (ref 0–0.6)
EOSINOPHILS RELATIVE PERCENT: 0.7 %
GFR AFRICAN AMERICAN: >60
GFR NON-AFRICAN AMERICAN: >60
GLUCOSE BLD-MCNC: 107 MG/DL (ref 70–99)
GLUCOSE BLD-MCNC: 108 MG/DL (ref 70–99)
HCT VFR BLD CALC: 37.6 % (ref 40.5–52.5)
HEMOGLOBIN: 12.4 G/DL (ref 13.5–17.5)
INR BLD: 1.12 (ref 0.87–1.14)
LYMPHOCYTES ABSOLUTE: 1.2 K/UL (ref 1–5.1)
LYMPHOCYTES RELATIVE PERCENT: 9.9 %
MCH RBC QN AUTO: 26.9 PG (ref 26–34)
MCHC RBC AUTO-ENTMCNC: 33 G/DL (ref 31–36)
MCV RBC AUTO: 81.6 FL (ref 80–100)
MONOCYTES ABSOLUTE: 0.5 K/UL (ref 0–1.3)
MONOCYTES RELATIVE PERCENT: 4.2 %
NEUTROPHILS ABSOLUTE: 10.5 K/UL (ref 1.7–7.7)
NEUTROPHILS RELATIVE PERCENT: 84.7 %
PDW BLD-RTO: 14.5 % (ref 12.4–15.4)
PERFORMED ON: ABNORMAL
PLATELET # BLD: 266 K/UL (ref 135–450)
PMV BLD AUTO: 7.7 FL (ref 5–10.5)
POTASSIUM SERPL-SCNC: 3.7 MMOL/L (ref 3.5–5.1)
PROTHROMBIN TIME: 14.4 SEC (ref 11.7–14.5)
RBC # BLD: 4.61 M/UL (ref 4.2–5.9)
SODIUM BLD-SCNC: 140 MMOL/L (ref 136–145)
TOTAL PROTEIN: 7.7 G/DL (ref 6.4–8.2)
TROPONIN: <0.01 NG/ML
WBC # BLD: 12.4 K/UL (ref 4–11)

## 2022-06-24 PROCEDURE — 93005 ELECTROCARDIOGRAM TRACING: CPT | Performed by: NURSE PRACTITIONER

## 2022-06-24 PROCEDURE — 80053 COMPREHEN METABOLIC PANEL: CPT

## 2022-06-24 PROCEDURE — 85610 PROTHROMBIN TIME: CPT

## 2022-06-24 PROCEDURE — 70450 CT HEAD/BRAIN W/O DYE: CPT

## 2022-06-24 PROCEDURE — 36415 COLL VENOUS BLD VENIPUNCTURE: CPT

## 2022-06-24 PROCEDURE — 85025 COMPLETE CBC W/AUTO DIFF WBC: CPT

## 2022-06-24 PROCEDURE — 84484 ASSAY OF TROPONIN QUANT: CPT

## 2022-06-24 PROCEDURE — 71045 X-RAY EXAM CHEST 1 VIEW: CPT

## 2022-06-24 PROCEDURE — 99285 EMERGENCY DEPT VISIT HI MDM: CPT

## 2022-06-24 PROCEDURE — 6360000004 HC RX CONTRAST MEDICATION: Performed by: NURSE PRACTITIONER

## 2022-06-24 PROCEDURE — 70496 CT ANGIOGRAPHY HEAD: CPT

## 2022-06-24 RX ADMIN — IOPAMIDOL 75 ML: 755 INJECTION, SOLUTION INTRAVENOUS at 21:24

## 2022-06-24 ASSESSMENT — PAIN - FUNCTIONAL ASSESSMENT: PAIN_FUNCTIONAL_ASSESSMENT: NONE - DENIES PAIN

## 2022-06-24 ASSESSMENT — ENCOUNTER SYMPTOMS
VOMITING: 0
CHEST TIGHTNESS: 0
SHORTNESS OF BREATH: 0
NAUSEA: 0
DIARRHEA: 0
ABDOMINAL PAIN: 0

## 2022-06-25 ENCOUNTER — APPOINTMENT (OUTPATIENT)
Dept: MRI IMAGING | Age: 67
DRG: 066 | End: 2022-06-25
Attending: INTERNAL MEDICINE
Payer: COMMERCIAL

## 2022-06-25 ENCOUNTER — HOSPITAL ENCOUNTER (INPATIENT)
Age: 67
LOS: 5 days | Discharge: HOME | DRG: 066 | End: 2022-06-30
Attending: INTERNAL MEDICINE | Admitting: INTERNAL MEDICINE
Payer: COMMERCIAL

## 2022-06-25 VITALS
SYSTOLIC BLOOD PRESSURE: 142 MMHG | OXYGEN SATURATION: 95 % | BODY MASS INDEX: 21.86 KG/M2 | HEART RATE: 54 BPM | DIASTOLIC BLOOD PRESSURE: 71 MMHG | WEIGHT: 147.6 LBS | TEMPERATURE: 97.9 F | RESPIRATION RATE: 15 BRPM | HEIGHT: 69 IN

## 2022-06-25 PROBLEM — I63.9 ISCHEMIC STROKE (HCC): Status: ACTIVE | Noted: 2022-06-25

## 2022-06-25 LAB
ANTI-XA UNFRAC HEPARIN: <0.1 IU/ML (ref 0.3–0.7)
APTT: 34.5 SEC (ref 23–34.3)
BACTERIA: NORMAL /HPF
BILIRUBIN URINE: NEGATIVE
BLOOD, URINE: NEGATIVE
CLARITY: CLEAR
COLOR: YELLOW
EKG ATRIAL RATE: 62 BPM
EKG DIAGNOSIS: NORMAL
EKG P AXIS: 70 DEGREES
EKG P-R INTERVAL: 130 MS
EKG Q-T INTERVAL: 476 MS
EKG QRS DURATION: 126 MS
EKG QTC CALCULATION (BAZETT): 483 MS
EKG R AXIS: -59 DEGREES
EKG T AXIS: 19 DEGREES
EKG VENTRICULAR RATE: 62 BPM
EPITHELIAL CELLS, UA: 0 /HPF (ref 0–5)
GLUCOSE URINE: NEGATIVE MG/DL
HYALINE CASTS: 0 /LPF (ref 0–8)
KETONES, URINE: NEGATIVE MG/DL
LEUKOCYTE ESTERASE, URINE: NEGATIVE
LV EF: 58 %
LVEF MODALITY: NORMAL
MICROSCOPIC EXAMINATION: YES
NITRITE, URINE: NEGATIVE
PH UA: 8 (ref 5–8)
PROTEIN UA: ABNORMAL MG/DL
RBC UA: 1 /HPF (ref 0–4)
SPECIFIC GRAVITY UA: >=1.03 (ref 1–1.03)
URINE REFLEX TO CULTURE: ABNORMAL
URINE TYPE: ABNORMAL
UROBILINOGEN, URINE: 1 E.U./DL
WBC UA: 0 /HPF (ref 0–5)

## 2022-06-25 PROCEDURE — 6360000004 HC RX CONTRAST MEDICATION: Performed by: SINGLE SPECIALTY

## 2022-06-25 PROCEDURE — 93010 ELECTROCARDIOGRAM REPORT: CPT | Performed by: INTERNAL MEDICINE

## 2022-06-25 PROCEDURE — 6360000002 HC RX W HCPCS: Performed by: STUDENT IN AN ORGANIZED HEALTH CARE EDUCATION/TRAINING PROGRAM

## 2022-06-25 PROCEDURE — 92523 SPEECH SOUND LANG COMPREHEN: CPT

## 2022-06-25 PROCEDURE — 99223 1ST HOSP IP/OBS HIGH 75: CPT | Performed by: INTERNAL MEDICINE

## 2022-06-25 PROCEDURE — 36415 COLL VENOUS BLD VENIPUNCTURE: CPT

## 2022-06-25 PROCEDURE — 70544 MR ANGIOGRAPHY HEAD W/O DYE: CPT

## 2022-06-25 PROCEDURE — 85520 HEPARIN ASSAY: CPT

## 2022-06-25 PROCEDURE — 92610 EVALUATE SWALLOWING FUNCTION: CPT

## 2022-06-25 PROCEDURE — 2000000000 HC ICU R&B

## 2022-06-25 PROCEDURE — 6370000000 HC RX 637 (ALT 250 FOR IP): Performed by: STUDENT IN AN ORGANIZED HEALTH CARE EDUCATION/TRAINING PROGRAM

## 2022-06-25 PROCEDURE — APPNB60 APP NON BILLABLE TIME 46-60 MINS

## 2022-06-25 PROCEDURE — 70551 MRI BRAIN STEM W/O DYE: CPT

## 2022-06-25 PROCEDURE — 70549 MR ANGIOGRAPH NECK W/O&W/DYE: CPT

## 2022-06-25 PROCEDURE — A9579 GAD-BASE MR CONTRAST NOS,1ML: HCPCS | Performed by: SINGLE SPECIALTY

## 2022-06-25 PROCEDURE — 6370000000 HC RX 637 (ALT 250 FOR IP)

## 2022-06-25 PROCEDURE — 81001 URINALYSIS AUTO W/SCOPE: CPT

## 2022-06-25 PROCEDURE — 85730 THROMBOPLASTIN TIME PARTIAL: CPT

## 2022-06-25 PROCEDURE — C8929 TTE W OR WO FOL WCON,DOPPLER: HCPCS

## 2022-06-25 RX ORDER — ONDANSETRON 4 MG/1
4 TABLET, ORALLY DISINTEGRATING ORAL EVERY 8 HOURS PRN
Status: DISCONTINUED | OUTPATIENT
Start: 2022-06-25 | End: 2022-06-30 | Stop reason: HOSPADM

## 2022-06-25 RX ORDER — ONDANSETRON 2 MG/ML
4 INJECTION INTRAMUSCULAR; INTRAVENOUS EVERY 6 HOURS PRN
Status: DISCONTINUED | OUTPATIENT
Start: 2022-06-25 | End: 2022-06-30 | Stop reason: HOSPADM

## 2022-06-25 RX ORDER — ROSUVASTATIN CALCIUM 20 MG/1
40 TABLET, COATED ORAL NIGHTLY
Status: DISCONTINUED | OUTPATIENT
Start: 2022-06-25 | End: 2022-06-30 | Stop reason: HOSPADM

## 2022-06-25 RX ORDER — ASPIRIN 300 MG/1
300 SUPPOSITORY RECTAL DAILY
Status: DISCONTINUED | OUTPATIENT
Start: 2022-06-25 | End: 2022-06-25

## 2022-06-25 RX ORDER — ASPIRIN 81 MG/1
81 TABLET ORAL DAILY
Status: DISCONTINUED | OUTPATIENT
Start: 2022-06-25 | End: 2022-06-25

## 2022-06-25 RX ORDER — POLYETHYLENE GLYCOL 3350 17 G/17G
17 POWDER, FOR SOLUTION ORAL DAILY PRN
Status: DISCONTINUED | OUTPATIENT
Start: 2022-06-25 | End: 2022-06-30 | Stop reason: HOSPADM

## 2022-06-25 RX ORDER — CLOPIDOGREL BISULFATE 75 MG/1
75 TABLET ORAL DAILY
Status: DISCONTINUED | OUTPATIENT
Start: 2022-06-25 | End: 2022-06-26

## 2022-06-25 RX ORDER — HEPARIN SODIUM 10000 [USP'U]/100ML
5-30 INJECTION, SOLUTION INTRAVENOUS CONTINUOUS
Status: DISCONTINUED | OUTPATIENT
Start: 2022-06-25 | End: 2022-06-30

## 2022-06-25 RX ADMIN — HEPARIN SODIUM 12 UNITS/KG/HR: 10000 INJECTION, SOLUTION INTRAVENOUS at 14:01

## 2022-06-25 RX ADMIN — ROSUVASTATIN CALCIUM 40 MG: 20 TABLET, COATED ORAL at 21:10

## 2022-06-25 RX ADMIN — CLOPIDOGREL BISULFATE 75 MG: 75 TABLET ORAL at 15:23

## 2022-06-25 RX ADMIN — GADOTERIDOL 14 ML: 279.3 INJECTION, SOLUTION INTRAVENOUS at 09:54

## 2022-06-25 ASSESSMENT — PAIN SCALES - GENERAL
PAINLEVEL_OUTOF10: 0

## 2022-06-25 ASSESSMENT — ENCOUNTER SYMPTOMS
WHEEZING: 0
SHORTNESS OF BREATH: 0
VOMITING: 0
VOMITING: 0
COUGH: 0
SHORTNESS OF BREATH: 0
ABDOMINAL DISTENTION: 0
NAUSEA: 0
DIARRHEA: 0
ABDOMINAL PAIN: 0
NAUSEA: 0

## 2022-06-25 NOTE — PROGRESS NOTES
4 Eyes Admission Assessment      I agree as the admission nurse that 2 RN's have performed a thorough Head to Toe Skin Assessment on the patient. ALL assessment sites listed below have been assessed on admission. Areas assessed by both nurses:   [x]   Head, Face, and Ears   [x]   Shoulders, Back, and Chest  [x]   Arms, Elbows, and Hands   [x]   Coccyx, Sacrum, and Ischum  [x]   Legs, Feet, and Heels                        Does the Patient have Skin Breakdown?   NO        Reynold Prevention initiated:  NO  Wound Care Orders initiated:  NO      [facility] nurse consulted for Pressure Injury (Stage 3,4, Unstageable, DTI, NWPT, and Complex wounds):  NO     Nurse 1 eSignature: Electronically signed by Richar Degroot on 6/25/22 at 7:27 AM EDT     **SHARE this note so that the co-signing nurse is able to place an eSignature**     Nurse 2 eSignature: Yaneli Degroot RN 06/25/22 at 0730

## 2022-06-25 NOTE — CONSULTS
Neurology / Neurocritical Care Consult Note    Chidi Burrell MD is requesting this consult. Reason for Consult: ischemic stroke vs TIA in the context of L ICA origin stenosis with concern of intraluminal thrombus formation  Admission Chief Complaint: speech difficulty    History of Present Illness     Dieudonne Bruce is a 75 y.o. y/o male with PMH significant for arthritis, GERD, HLD, incomplete RBB. Reported time of onset at 12:20 with slurred speech. Per my interview with the patient, reports that he also had difficulty grasping objects with his R hand and felt like it was more difficult to walk, but does not think that he was necessarily weak. Patient's wife last saw patient normal before leaving for work that morning. He presented to the ER and the stroke team was notified. ER provider noted that the patient was dysarthric, had a RUE drift as well as a R facial droop and some RLE numbness. CT head with no acute abnormality. CTA head and neck reviewed by Neurovascular, demonstrates critical cervical L ICA origin stenosis without associated tandem intracranial occlusion with concern for potential associated intraluminal thrombus formation but there is streak artifact in this location as well. Not a candidate for TPA due to his presentation time. He was transferred to [facility] for further management, planning for MRI brain and MRA head and neck and potential low dose no bolus heparin gtt depending on MRAs. Patient reports he does not have a history of high blood pressure, reports that last time he was at his PCP his systolic was 323. Reports he does not take a statin at home and that he is allergic to aspirin \"I broke out in hives\". He is a current smoker. Currently reports that he feels like he has occaisional word finding difficulty, but feels that his speech does not seem slurred to him and his symptoms have significantly improved/otherwise resolved. No facial weakness or pronator drift observed. NIHSS 1 for reported word finding difficulty though he does well with formal testing. He denies associated headache, dizziness, lightheadedness, numbness/tingling. He has never had anything like this happen before. REVIEW OF SYSTEMS:   Constitutional- No weight loss or fevers   Eyes- No diplopia. No photophobia. Ears/nose/throat- No dysphagia. No Dysarthria   Cardiovascular- No palpitations. No chest pain   Respiratory- No dyspnea. No Cough   Gastrointestinal- No Abdominal pain. No Vomiting. Genitourinary- No incontinence. No urinary retention   Musculoskeletal- No myalgia. No arthralgia   Skin- No rash. No easy bruising. Psychiatric- No depression. No anxiety   Endocrine- No diabetes. No thyroid issues. Hematologic- No bleeding difficulty. No fatigue   Neurologic- No headache. No focal weakness. No numbness/tingling.     Past Medical, Surgical, Family, and Social History   PAST MEDICAL HISTORY:  Past Medical History:   Diagnosis Date    Arthritis     GERD (gastroesophageal reflux disease)     Hyperlipidemia     Hyperlipidemia 5/27/2012    Incomplete RBBB     Ruptured disk 2019    Varicose veins of both lower extremities      SURGICAL HISTORY:  Past Surgical History:   Procedure Laterality Date    COLONOSCOPY  9/9/2020    COLONOSCOPY WITH BIOPSY performed by Mali High MD at [address] Bilateral 1/31/2022    BILATERAL ENDOVENOUS [address], RIGHT ENDOVENOUS [address], ([phone]) performed by Skyla Royal MD at [address] N/A 9/9/2020    EGD BIOPSY performed by Mali High MD at [facility] N/A 9/9/2020    EGD DILATION BALLOON performed by Mali High MD at [address] [facility] Bilateral 1/31/2022    BILATERAL STAB PHLEBECTOMIES performed by Skyla Royal MD at [facility] OR     FAMILY HISTORY & SOCIAL HISTORY:  Family history non-contributory  Family History   Problem Relation Age of Onset    Breast Cancer Mother 61     Social History     Tobacco Use    Smoking status: Current Every Day Smoker     Packs/day: 0.50     Years: 54.00     Pack years: 27.00     Types: Cigarettes    Smokeless tobacco: Never Used   Vaping Use    Vaping Use: Never used   Substance Use Topics    Alcohol use: Not Currently     Comment: Pt reported that he stopped drinking 15 years ago.  Drug use: Not Currently     Types: Opiates , Cocaine     Comment: Pt reported that he's been clean for 15 years. Allergies & Outpatient Medications   ALLERGIES:  Allergies   Allergen Reactions    Pcn [Penicillins] Hives     HOME MEDICATIONS:  Current Discharge Medication List      CONTINUE these medications which have NOT CHANGED    Details   methylPREDNISolone (MEDROL DOSEPACK) 4 MG tablet Take by mouth as instructed  Qty: 1 kit, Refills: 0    Associated Diagnoses:  Mass of floor of mouth      Multiple Vitamins-Minerals (THERAPEUTIC MULTIVITAMIN-MINERALS) tablet Take 1 tablet by mouth daily      KRILL OIL PO Take by mouth      omeprazole (PRILOSEC) 40 MG delayed release capsule TAKE 1 CAPSULE BY MOUTH EVERY DAY      [place]  MG CPCR Take 300 mg by mouth daily  Qty: 90 capsule, Refills: 3    Associated Diagnoses: Symptomatic varicose veins of both lower extremities      sildenafil (VIAGRA) 100 MG tablet Take 1 tablet by mouth as needed for Erectile Dysfunction  Qty: 10 tablet, Refills: 5    Associated Diagnoses: Erectile dysfunction, unspecified erectile dysfunction type               Physical Exam   PHYSICAL EXAM:  Vitals:    06/25/22 0554 06/25/22 0600   BP: (!) 147/69 129/60   Pulse: 59 53   Resp: 19 16   Temp: 98 °F (36.7 °C)    TempSrc: Oral    SpO2: 100%    Weight: 141 lb 8.6 oz (64.2 kg)    Height: 5' 9\" (1.753 m)          General: Alert, no distress, well-nourished  Neurologic  Mental artery, at risk for contributing to distal emboli.       Otherwise, no acute abnormality or flow-limiting stenosis in the remainder of   the major arteries of the head and neck. MRI Brain w/o Contrast:  ordered    MRA Head and Neck WO Contrast:  Ordered      LABS:  All results below personally reviewed. Pertinent positives & negatives are addressed in Impression & Recommendations below. LABS   Metabolic Panel Recent Labs     06/24/22 2123      K 3.7      CO2 26   BUN 11   CREATININE 0.9   GLUCOSE 108*   CALCIUM 9.4   LABALBU 4.4   ALKPHOS 101   ALT 12   AST 20      CBC / Coags Recent Labs     06/24/22 2123   WBC 12.4*   RBC 4.61   HGB 12.4*   HCT 37.6*      INR 1.12      Other No results for input(s): LABA1C, LDLCALC, TRIG, TSH, [id number], FOLATE, LABSALI, COVID19 in the last 72 hours. No results for input(s): PHENYTOIN, KEPPRA, LACOSA, LAMO, VALPROATE, LACTSEPSIS, LACTA in the last 72 hours. CURRENT SCHEDULED MEDICATIONS   Inpatient Medications   rosuvastatin, 40 mg, Oral, Nightly   Infusions      Antibiotics   Recent Abx Admin      No antibiotic orders with administrations found. IMPRESSION & RECOMMENDATIONS     IMPRESSION:  Manpreet Hollins is a 75 y.o. y/o male with PMH significant for arthritis, GERD, HLD, incomplete RBB who presented with slurred speech, R facial weakness, RLE numbness and RUE pronator drift who was found to have critical stenosis of his critical cervical L ICA origin stenosis and some concern of associated thrombus. He will be receiving MRI brain and MRA head and Neck for further evaluation. He currently is reporting some word finding difficulty but otherwise asymptomatic.     RECOMMENDATIONS:  - Await results of MRI brain and MRA head and neck, depending on results per Neurovascular recommendations may need low dose no bolus heparin gtt if thrombus seen on MRA  - Q1 hour neuro checks for now  - NIHSS per guidelines  - Patient reporting allergy to ASA  - Initiate statin  - Telemetry while inpatient  - PT/OT/SLP  - Allow BP to autoregulate for first 24 hours, treat for systolic > 863  - Hgb L0Q and Lipid panel if not recently obtained  - ECHO with Bubble study  - Will need stroke education at discharge  - Will need to follow up with Neurology after discharge  - Further recs pending MRI/MRA head and neck results. Please call Neurology with any exam change      YASMANY Pulido - CNP   Neurology & Neurocritical Care   Neurology Line: [phone]  [employer]: [facility] Neurology & Neuro Critical Care NPs  6/25/2022 8:11 AM    I spent 50 minutes in the care of this patient. Over 50% of that time was in face-to-face counseling regarding disease process, diagnostic testing, preventative measures, and answering patient and family questions.

## 2022-06-25 NOTE — PROGRESS NOTES
Speech Language Pathology  Facility/Department: [facility] ICU  Initial Speech/Language/Cognitive Assessment & DC    NAME: Yoanna Doe  : 1955   MRN: [id number]  ADMISSION DATE: 2022  ADMITTING DIAGNOSIS: has GERD (gastroesophageal reflux disease); Incomplete RBBB; Osteoarthritis of lumbar spine; Alcohol abuse; Prediabetes; History of drug abuse in remission ([facility] [address] Chronic hepatitis C virus genotype 1 infection ([facility] [address] Acrophobia; Symptomatic varicose veins of right lower extremity; Symptomatic varicose veins of both lower extremities; Postoperative pain of extremity; Symptomatic varicose veins of left lower extremity; Primary hypertension; Smoker; Acute CVA (cerebrovascular accident) ([facility] [address] and Ischemic stroke ([facility] [address] on their problem list.  DATE ONSET: 2022    Date of Eval: 2022   Evaluating Therapist: QUIQUE Eugene    RECENT RESULTS MRI: 2022  ression   1. Multiple foci of restricted diffusion, acute infarct in left middle cerebral artery territory in the left frontal, left parietal and temporal parietal lobe cortex.       2. Underlying moderate to severe chronic small vessel disease, periventricular microangiopathic leukoencephalopathy. Primary Complaint: resolved speech difficulty    Pain:  Pain Assessment  Pain Assessment: 0-10  Pain Level: 0  Patient's Stated Pain Goal: 0 - No pain    Assessment:  Diagnosis: Speech and expressive/receptive language skills appear grossly WNL. Informally assessed cognitive-linguistics; appear grossly functional at this time. Will sign off; if new concerns arise, please re-refer.     Recommendations:  Requires SLP Intervention: No  Duration of Treatment: NA  D/C Recommendations: No follow up therapy recommended post discharge       Plan:   Goals:  Short-term Goals  Timeframe for Short-term Goals: NA  Long-term Goals  Timeframe for Long-term Goals: NA   Patient/family involved in developing goals and treatment plan: the patient    Subjective:   Previous level of function and limitations:  independent     Social/Functional History  Lives With: Alone  Vision  Vision: Impaired  Vision Exceptions: Wears glasses at all times  Hearing  Hearing: Within functional limits           Objective:     Oral/Motor  Oral Hygiene: Moist;Clean    Auditory Comprehension  Comprehension: Exceptions  Basic Questions: WFL  One Step Commands: WFL  Two Step Commands: WFL  Conversation: WFL    Reading Comprehension  Reading Status: Within functional limits    Expression  Primary Mode of Expression: Verbal    Verbal Expression  Verbal Expression: Within functional limits    Written Expression  Dominant Hand: Right  Written Expression: Within Functional Limits         Pragmatics/Social Functioning  Pragmatics: Within functional limits    Cognition:      Orientation  Overall Orientation Status: Within Functional Limits  Attention  Attention: Within Functional Limits  Memory  Memory: Within Functional Limits    Prognosis:  Speech Therapy Prognosis  Prognosis: Good  Individuals consulted  Consulted and agree with results and recommendations: Patient;RN  RN Name: Diamond    Education:  Patient Education: Educated pt to purpose of visit, recommendations. Patient Education Response: Verbalizes understanding  Safety Devices in place: Yes  Type of devices: All fall risk precautions in place; Left in bed;Bed alarm in place;Call light within reach;Nurse notified    Therapy Time:   Individual Concurrent Group Co-treatment   Time In 1138         Time Out 1151         Minutes 13            Timed Code Treatment Minutes: 0 Minutes  Total Treatment Time: [address], [place], [address] [facility], .[id number]  Pg.  # [id number]

## 2022-06-25 NOTE — H&P
ICU HISTORY AND 20[facility] Day:   ICU Day:                                                          Code:Full Code  Admit Date: 6/25/2022  PCP: Lisbeth Perales MD                                  CC: dysarthria     HISTORY OF PRESENT ILLNESS:   Wilda Bronson is a 75 y.o. male with PMH as below notable for HTN, alcohol abuse, tobacco use who presented with dysarthria. Time of onset was reported at 12:20 PM. Patient report he was working when he started having slurred speech, patient reports he started also feeling numbness in his R arm. Patient decided to consult. Patient reports his symptoms lasted for approximately 3 hours. Patient denies current alcohol use. He states he smokes 50 packs/year history, currently smokes 1 pack. Lives at home with his family. The patient denies abdominal or flank pain, anorexia, nausea or vomiting, dysphagia, change in bowel habits or black or bloody stools or weight loss. Patient denies any exertional chest pain, dyspnea, palpitations, syncope, orthopnea, edema or paroxysmal nocturnal dyspnea. The patient denies dysuria, frequency or hematuria. He denies constitutional symptoms of fatigue, weakness, weight loss or gain, fevers, night sweats. On admission patient was hemodynamically stable and afebrile. Patient was AOx4 with dysarthria, no aphasia, R arm weakness with positive drift, also R sided facial droop. NIH 4. WBC 12. EKG did not show any acute ischemic changes. Trop neg. CXR did not show any acute cardiopulmonary abnormality. CT head did not show any acute intracranial abnormality. CTA head showed critical stenosis at the origin of the left internal carotid artery. Thrombus projecting into the lumen at the origin of the left internal carotid artery, at risk for contributing to distal emboli.  stroke team was consulted. Patient not candidate for tPA due to symptoms outside window.  NSGY was consulted recommended heparin drip and MR based non invasive vascular imaging to confirm presence of intraluminal thrombus within the cervical left ICA origin. Patient was admitted to the ICU for further workup and management of ischemic stroke vs TIA. PAST HISTORY:     Past Medical History:   Diagnosis Date    Arthritis     GERD (gastroesophageal reflux disease)     Hyperlipidemia     Hyperlipidemia 5/27/2012    Incomplete RBBB     Ruptured disk 2019    Varicose veins of both lower extremities        Past Surgical History:   Procedure Laterality Date    COLONOSCOPY  9/9/2020    COLONOSCOPY WITH BIOPSY performed by Luke Nichols MD at [address] Bilateral 1/31/2022    BILATERAL ENDOVENOUS [address], RIGHT ENDOVENOUS [address], ([phone], [phone]) performed by Liliana Patel MD at [address] N/A 9/9/2020    EGD BIOPSY performed by Luke Nichols MD at [address] N/A 9/9/2020    EGD DILATION BALLOON performed by Luke Nichols MD at [address] [facility] Bilateral 1/31/2022    BILATERAL STAB PHLEBECTOMIES performed by Liliana Patel MD at [facility]:    reports that he has been smoking cigarettes. He has a 27.00 pack-year smoking history. He has never used smokeless tobacco. He reports previous alcohol use. He reports previous drug use. Drugs: Opiates  and Cocaine. Family History:  Family History   Problem Relation Age of Onset    Breast Cancer Mother 61       MEDICATIONS:     No current facility-administered medications on file prior to encounter.      Current Outpatient Medications on File Prior to Encounter   Medication Sig Dispense Refill    methylPREDNISolone (MEDROL DOSEPACK) 4 MG tablet Take by mouth as instructed 1 kit 0    Multiple Vitamins-Minerals (THERAPEUTIC MULTIVITAMIN-MINERALS) tablet Take 1 tablet by mouth daily Psychiatric:         Mood and Affect: Mood normal.         Behavior: Behavior normal.         Access:   -Central Access Day #: NA                                -Peripheral Access Day#: 1  -Arterial line Day#:  NA                              Ashraf Day#: NA  NGT Day#:       NA                                   ETT Day#: NA  Vent Settings:  NA    IV:      DATA:       Labs:  CBC:   Recent Labs     06/24/22 2123   WBC 12.4*   HGB 12.4*   HCT 37.6*          BMP:   Recent Labs     06/24/22 2123      K 3.7      CO2 26   BUN 11   CREATININE 0.9   GLUCOSE 108*     LFT's:   Recent Labs     06/24/22 2123   AST 20   ALT 12   BILITOT 0.5   ALKPHOS 101     Troponin:   Recent Labs     06/24/22 2123   TROPONINI <0.01     BNP:No results for input(s): BNP in the last 72 hours. ABGs: No results for input(s): PHART, UVE7FDM, PO2ART in the last 72 hours. INR:   Recent Labs     06/24/22 2123   INR 1.12       U/A:  Recent Labs     06/25/22 0222   COLORU Yellow   PHUR 8.0   WBCUA 0   RBCUA 1   BACTERIA None Seen   CLARITYU Clear   SPECGRAV >=1.030   LEUKOCYTESUR Negative   UROBILINOGEN 1.0   BILIRUBINUR Negative   BLOODU Negative   GLUCOSEU Negative       MRI BRAIN WO CONTRAST    (Results Pending)   MRA HEAD WO CONTRAST    (Results Pending)   MRA NECK W WO CONTRAST    (Results Pending)       EKG: did not show any acute ischemic changes      ASSESSMENT AND PLAN:   Dandy Sneed is a 75 y.o. male with PMH as below notable for HTN, HLD alcohol abuse, tobacco use who presented with dysarthria. Patient was admitted to the ICU for further workup and management of ischemic stroke vs TIA. TIA in the context of left ICA origin stenosis with concern of intraluminal thrombus formation  Presented with dysarthria, no aphasia, R arm weakness with positive drift, also R sided facial droop. CT head did not show any acute intracranial abnormality.  CTA head showed critical stenosis at the origin of the left internal carotid artery. Thrombus projecting into the lumen at the origin of the left internal carotid artery, at risk for contributing to distal emboli. - q1h neurochecks, NPO, SLP/PT/OT, NSGY consult   - MRI head wo contrast, MRA head wo contrast, MRA neck w wo contrast If thrombus found patient will need to be started in heparin drip  -Permissive HTN BP goal <210/120   - Keep HOB >30 degrees   -Neurology consulted appreciate recs   - repeat CT and contact NSGY for any changes in neuro exam    Leukocytosis likely reactive  -Monitor VS  -F/u CBC    Tobacco use  -Avoid nicotine patch     Chronic problems   HTN  HLD-Pharmacy to med rec    Code Status: Full Code  FEN: Diet NPO  PPX:  SCDs  DISPO: ICU    This patient will be staffed and discussed with Fay Beckford MD.    -----------------------------  Byron Webster MD, PGY-1  6/25/2022  5:51 AM      Please note that this chart was generated using Dragon dictation software. Although every effort was made to ensure the accuracy of this automated transcription, some errors in transcription may have occurred.

## 2022-06-25 NOTE — ED NOTES
Urine obtained at 0300 sent to lab. Report called to Roge padgett RN at this time.      Benitez Gilbert RN  06/25/22 8126

## 2022-06-25 NOTE — CONSULTS
Neurosurgery Consult Note/H&P    Neurosurgical Pertinent HPI: Robin Marsh is a 75 y.o. male who presents to the [facility] ED with signs and symptoms consistent with an acute left hemispheric ischemic incident. Non-invasive vascular (CTA) and anatomical (NCHCT) imaging performed upon arrival was reviewed; as a composite, these studies demonstrate critical cervical left ICA origin stenosis without associated tandem intracranial occlusion. There is also concern for potential associated intraluminal thrombus formation. However, streak artifact confounds the fidelity of the images at this particular location. The patient was reportedly last seen normal at approximately 12:20 on 6/24/2022. The patient's current NIHSS is reported to be 4. The patient was not deemed to be an appropriate candidate for systemic IV rtPA therapy. Neurointerventional surgery consultation has been requested for further evaluation through dedicated, catheter-based diagnostic cerebral angiography and potential expanded-window endovascular revascularization if angiographically indicated and technically feasible. Assessment & Plan:  Given the insufficient severity of stroke symptoms (NIHSS <6) and the absence of any discernible tandem intracranial associated with the cervical left ICA origin critical stenosis, expanded-window endovascular intervention is not felt to be warranted at this time. Should there be intraluminal thrombus formation associated with the cervical left ICA origin critical stenosis, however, systemic heparinization should be expeditiously initiated. At this time, it is unclear whether such intraluminal thrombus exists given that the fidelity of the noninvasive vascular imaging is confounded by streak artifact at the level of the cervical left ICA origin.   The patient will therefore be transferred to [address] [place] [address] under the care of the inpatient intensivist service for close observation and immediate overnight acquisition of MR-based anatomical (MRI brain w/o contrast) and noninvasive vascular (MRA head w/o contrast, MRA neck w/wo contrast) imaging studies. Should the additional MR-based noninvasive vascular imaging studies confirm the presence of intraluminal thrombs formation within the cervical left ICA origin, it is recommended that the low-dose, no-bolus systemic heparinization protocol be immediately initiated. Repeat MR-based noninvasive vascular imaging studies will then be repeated in 3-5 days from the point at which a therapeutic hptt or anti-Xa level is first reached with further plan of care to be predicated upon the imaging findings. The inpatient neurology service should also be consulted upon the patient's arrival at [place] [address]. Further neurointerventional surgery evaluation and management moving forward will only be necessary should thromboembolic incident and resultant ipsilateral large vessel occlusion occur. Please call with questions.       Past medical history:  Past Medical History:   Diagnosis Date    Arthritis     GERD (gastroesophageal reflux disease)     Hyperlipidemia     Hyperlipidemia 5/27/2012    Incomplete RBBB     Ruptured disk 2019    Varicose veins of both lower extremities        Past surgical history:  Past Surgical History:   Procedure Laterality Date    COLONOSCOPY  9/9/2020    COLONOSCOPY WITH BIOPSY performed by Juliana Dodd MD at [address] Bilateral 1/31/2022    BILATERAL ENDOVENOUS [address], RIGHT ENDOVENOUS [address], ([id number], [id number]) performed by Neida Medeiros MD at [facility] N/A 9/9/2020    EGD BIOPSY performed by Juliana Dodd MD at [place] [address] N/A 9/9/2020    EGD DILATION BALLOON performed by Juliana Dodd MD at [address] [place] [address] 1/31/2022    BILATERAL STAB PHLEBECTOMIES performed by Lynnette Browne MD at [address] [place] [address]       Medications:  No current facility-administered medications on file prior to encounter. Current Outpatient Medications on File Prior to Encounter   Medication Sig Dispense Refill    methylPREDNISolone (MEDROL DOSEPACK) 4 MG tablet Take by mouth as instructed 1 kit 0    Multiple Vitamins-Minerals (THERAPEUTIC MULTIVITAMIN-MINERALS) tablet Take 1 tablet by mouth daily      KRILL OIL PO Take by mouth      omeprazole (PRILOSEC) 40 MG delayed release capsule TAKE 1 CAPSULE BY MOUTH EVERY DAY      [place]  MG CPCR Take 300 mg by mouth daily 90 capsule 3    sildenafil (VIAGRA) 100 MG tablet Take 1 tablet by mouth as needed for Erectile Dysfunction 10 tablet 5       Allergies: Allergies   Allergen Reactions    Pcn [Penicillins] Hives       Family history:  Family History   Problem Relation Age of Onset   Cesilia Esteves Breast Cancer Mother 61       Social history:  Social History     Socioeconomic History    Marital status:      Spouse name: Not on file    Number of children: Not on file    Years of education: Not on file    Highest education level: Not on file   Occupational History    Not on file   Tobacco Use    Smoking status: Current Every Day Smoker     Packs/day: 0.50     Years: 54.00     Pack years: 27.00     Types: Cigarettes    Smokeless tobacco: Never Used   Vaping Use    Vaping Use: Never used   Substance and Sexual Activity    Alcohol use: Not Currently     Comment: Pt reported that he stopped drinking 15 years ago.  Drug use: Not Currently     Types: Opiates , Cocaine     Comment: Pt reported that he's been clean for 15 years.     Sexual activity: Not on file   Other Topics Concern    Not on file   Social History Narrative    Not on file     Social Determinants of Health     Financial Resource Strain:     Difficulty of Paying Living Expenses: Not on file   Food Insecurity:     Worried About Running Out of Food in the Last Year: Not on file    Ran Out of Food in the Last Year: Not on file   Transportation Needs:     Lack of Transportation (Medical): Not on file    Lack of Transportation (Non-Medical): Not on file   Physical Activity: Sufficiently Active    Days of Exercise per Week: 7 days    Minutes of Exercise per Session: 150+ min   Stress:     Feeling of Stress : Not on file   Social Connections:     Frequency of Communication with Friends and Family: Not on file    Frequency of Social Gatherings with Friends and Family: Not on file    Attends Buddhist Services: Not on file    Active Member of Clubs or Organizations: Not on file    Attends Club or Organization Meetings: Not on file    Marital Status: Not on file   Intimate Partner Violence:     Fear of Current or Ex-Partner: Not on file    Emotionally Abused: Not on file    Physically Abused: Not on file    Sexually Abused: Not on file   Housing Stability:     Unable to Pay for Housing in the Last Year: Not on file    Number of [place] in the Last Year: Not on file    Unstable Housing in the Last Year: Not on file       Review of Systems   Constitutional: Negative for fever. Respiratory: Negative for shortness of breath. Cardiovascular: Negative for chest pain. Gastrointestinal: Negative for nausea and vomiting. Neurological: Positive for facial asymmetry, speech difficulty and weakness. Negative for syncope and headaches.        Laboratory:  Recent Results (from the past 24 hour(s))   POCT Glucose    Collection Time: 06/24/22  9:08 PM   Result Value Ref Range    POC Glucose 107 (H) 70 - 99 mg/dl    Performed on ACCU-CHEK    CBC with Auto Differential    Collection Time: 06/24/22  9:23 PM   Result Value Ref Range    WBC 12.4 (H) 4.0 - 11.0 K/uL    RBC 4.61 4.20 - 5.90 M/uL    Hemoglobin 12.4 (L) 13.5 - 17.5 g/dL    Hematocrit 37.6 (L) 40.5 - 52.5 %    MCV 81.6 80.0 - 100.0 fL    MCH 26.9 26.0 - 34.0 pg    MCHC 33.0 31.0 - 36.0 g/dL    RDW 14.5 12.4 - 15.4 %    Platelets 022 683 - 826 K/uL    MPV 7.7 5.0 - 10.5 fL    Neutrophils % 84.7 %    Lymphocytes % 9.9 %    Monocytes % 4.2 %    Eosinophils % 0.7 %    Basophils % 0.5 %    Neutrophils Absolute 10.5 (H) 1.7 - 7.7 K/uL    Lymphocytes Absolute 1.2 1.0 - 5.1 K/uL    Monocytes Absolute 0.5 0.0 - 1.3 K/uL    Eosinophils Absolute 0.1 0.0 - 0.6 K/uL    Basophils Absolute 0.1 0.0 - 0.2 K/uL   Comprehensive Metabolic Panel    Collection Time: 06/24/22  9:23 PM   Result Value Ref Range    Sodium 140 136 - 145 mmol/L    Potassium 3.7 3.5 - 5.1 mmol/L    Chloride 103 99 - 110 mmol/L    CO2 26 21 - 32 mmol/L    Anion Gap 11 3 - 16    Glucose 108 (H) 70 - 99 mg/dL    BUN 11 7 - 20 mg/dL    CREATININE 0.9 0.8 - 1.3 mg/dL    GFR Non-African American >60 >60    GFR African American >60 >60    Calcium 9.4 8.3 - 10.6 mg/dL    Total Protein 7.7 6.4 - 8.2 g/dL    Albumin 4.4 3.4 - 5.0 g/dL    Albumin/Globulin Ratio 1.3 1.1 - 2.2    Total Bilirubin 0.5 0.0 - 1.0 mg/dL    Alkaline Phosphatase 101 40 - 129 U/L    ALT 12 10 - 40 U/L    AST 20 15 - 37 U/L   Troponin    Collection Time: 06/24/22  9:23 PM   Result Value Ref Range    Troponin <0.01 <0.01 ng/mL   Protime-INR    Collection Time: 06/24/22  9:23 PM   Result Value Ref Range    Protime 14.4 11.7 - 14.5 sec    INR 1.12 0.87 - 1.14   EKG 12 Lead    Collection Time: 06/24/22  9:50 PM   Result Value Ref Range    Ventricular Rate 62 BPM    Atrial Rate 62 BPM    P-R Interval 130 ms    QRS Duration 126 ms    Q-T Interval 476 ms    QTc Calculation (Bazett) 483 ms    P Axis 70 degrees    R Axis -59 degrees    T Axis 19 degrees    Diagnosis       Normal sinus rhythmPossible Left atrial enlargementRight bundle branch blockLeft anterior fascicular block *Bifascicular block *        Radiology:  XR CHEST PORTABLE   Final Result   No evidence of acute process. Hyperinflated lungs.          CTA HEAD NECK W CONTRAST   Final Result   Critical stenosis at the origin of the left internal carotid artery. Thrombus projecting into the lumen at the origin of the left internal carotid   artery, at risk for contributing to distal emboli. Otherwise, no acute abnormality or flow-limiting stenosis in the remainder of   the major arteries of the head and neck. CT HEAD WO CONTRAST   Final Result   No acute intracranial abnormality. Minimal parenchymal volume loss. Mild chronic microvascular disease. Physical exam:  Vitals:    06/24/22 2214 06/24/22 2315 06/24/22 2330 06/24/22 2345   BP:  128/71 134/69 132/73   Pulse: 52 64 54 57   Resp: 16 14 18 17   Temp:       SpO2:  99% 94% 92%   Weight:       Height:               Gena Arceo MD  [phone]    In excess of 25 minutes was spent elucidating the patients clinical history, &/or reviewing/interpreting recent laboratory and radiographic findings, &/or performing of a focused physical examination, &/or formulation of a plan of care (from a neurosurgical perspective), &/or providing education with regards to disease process, &/or and providing updates with regards to clinical course to date and plan of care moving forward. Critical care time is inclusive of direct delivery of care, coordination of care and/or record review by myself for this critically ill/injured patient. This patient has one or more acutely injured vital organ systems such that the patient has a high probability of imminent or life threatening deterioration in condition. This patient's care required high complexity medical decision-making to assess, manipulate, and support vital organ system function in order to treat single or multiple vital organ system failure. Some of this encounter was transcribed using Dragon Naturally Speaking computerized voice recognition without a human . This report may or may not have been adjusted for typographical or medical and syntax errors.  If you find any errors do not hesitate to contact me.

## 2022-06-25 NOTE — PROGRESS NOTES
Speech Language Pathology  Facility/Department: [facility] ICU   CLINICAL BEDSIDE SWALLOW EVALUATION & DC    NAME: Petey Lance  : 1955  MRN: [id number]    ADMISSION DATE: 2022  ADMITTING DIAGNOSIS: has GERD (gastroesophageal reflux disease); Incomplete RBBB; Osteoarthritis of lumbar spine; Alcohol abuse; Prediabetes; History of drug abuse in remission ([facility] Chronic hepatitis C virus genotype 1 infection ([facility] Acrophobia; Symptomatic varicose veins of right lower extremity; Symptomatic varicose veins of both lower extremities; Postoperative pain of extremity; Symptomatic varicose veins of left lower extremity; Primary hypertension; Smoker; Acute CVA (cerebrovascular accident) ([facility] and Ischemic stroke ([facility] on their problem list.  ONSET DATE: 2022    Recent Chest Xray: (2022)     Impression   No evidence of acute process.       Hyperinflated lungs.             MRI Brain: (2022)  Impression   1. Multiple foci of restricted diffusion, acute infarct in left middle cerebral artery territory in the left frontal, left parietal and temporal parietal lobe cortex.       2. Underlying moderate to severe chronic small vessel disease, periventricular microangiopathic leukoencephalopathy. Date of Eval: 2022  Evaluating Therapist: QUIQUE Kamara    Current Diet level:  Current Diet : NPO      Primary Complaint  Patient Complaint: Patient denies difficulty swallowing    Pain:  Pain Assessment  Pain Assessment: 0-10  Pain Level: 0  Patient's Stated Pain Goal: 0 - No pain    Reason for Referral  Petey Lance was referred for a bedside swallow evaluation to assess the efficiency of his swallow function, identify signs and symptoms of aspiration and make recommendations regarding safe dietary consistencies, effective compensatory strategies, and safe eating environment.     Impression  Dysphagia Diagnosis: Swallow function appears WFL  Dysphagia Impression : Swallow function appears grossly intact for PO trials assessed (thins via cup/straw/3oz screen, regular solid), with pt demonstrating positive oral acceptance, timely mastication, positive swallow movement, good oral cearance, and passing 3 oz screen with no overt s/s aspiration. Recommend regular texture diet and thin liquids. No further dysphagia therapy appears warranted at this time. Dysphagia Outcome Severity Scale: Level 7: Normal in all situations     Treatment Plan  Requires SLP Intervention: No  Duration of Treatment: NA  D/C Recommendations: No follow up therapy recommended post discharge       Recommended Diet and Intervention  Regular solids and thin liquids  Recommended Form of Meds: PO       Compensatory Swallowing Strategies  Compensatory Swallowing Strategies : Upright as possible for all oral intake    Treatment/Goals  Short-term Goals  Timeframe for Short-term Goals: NA  Long-term Goals  Timeframe for Long-term Goals: NA    General  Chart Reviewed: Yes  Comments: Per admitting H&P (06/25/2022): 'Hetal Argueta is a 75 y.o. male with PMH as below notable for HTN, alcohol abuse, tobacco use who presented with dysarthria. Time of onset was reported at 12:20 PM. Patient report he was working when he started having slurred speech, patient reports he started also feeling numbness in his R arm. Patient decided to consult. Patient reports his symptoms lasted for approximately 3 hours. Patient denies current alcohol use. He states he smokes 50 packs/year history, currently smokes 1 pack. Lives at home with his family. The patient denies abdominal or flank pain, anorexia, nausea or vomiting, dysphagia, change in bowel habits or black or bloody stools or weight loss. Patient denies any exertional chest pain, dyspnea, palpitations, syncope, orthopnea, edema or paroxysmal nocturnal dyspnea. The patient denies dysuria, frequency or hematuria.   He denies constitutional symptoms of fatigue, weakness, weight loss or gain, fevers, night sweats. On admission patient was hemodynamically stable and afebrile. Patient was AOx4 with dysarthria, no aphasia, R arm weakness with positive drift, also R sided facial droop. NIH 4. WBC 12. EKG did not show any acute ischemic changes. Trop neg. CXR did not show any acute cardiopulmonary abnormality. CT head did not show any acute intracranial abnormality. CTA head showed critical stenosis at the origin of the left internal carotid artery. Thrombus projecting into the lumen at the origin of the left internal carotid artery, at risk for contributing to distal emboli.  stroke team was consulted. Patient not candidate for tPA due to symptoms outside window. NSGY was consulted recommended heparin drip and MR based non invasive vascular imaging to confirm presence of intraluminal thrombus within the cervical left ICA origin. Patient was admitted to the ICU for further workup and management of ischemic stroke vs TIA.'  Subjective  Subjective: Patient awake, alert, pleasant, seen seated up in bed. Cleared by RN to assess patient. Behavior/Cognition: Alert; Cooperative;Pleasant mood  Respiratory Status: Room air  O2 Device: None (Room air)  Follows Directions: Simple  Dentition: Dentures top;Dentures bottom  Patient Positioning: Upright in bed  Baseline Vocal Quality: Normal  Volitional Cough: Strong  Prior Dysphagia History: None found in chart review    Vision/Hearing  Vision  Vision: Impaired  Vision Exceptions: Wears glasses at all times  Hearing  Hearing: Within functional limits    Oral Motor Deficits  Oral/Motor  Oral Hygiene: Moist;Clean    Oral Phase Dysfunction  WFL     Indicators of Pharyngeal Phase Dysfunction  WFL    Prognosis  Individuals consulted  Consulted and agree with results and recommendations: Patient;RN  RN Name: [id number]    Education  Patient Education: Educated pt to purpose of visit, recommendations.   Patient Education Response: Verbalizes understanding  Safety Devices in place: Yes  Type of devices: All fall risk precautions in place; Left in bed;Bed alarm in place;Call light within reach;Nurse notified       Therapy Time  SLP Individual Minutes  Time In: 6643  Time Out: 1201  Minutes: 10     SLP Total Treatment Time  Timed Code Treatment Minutes: 0 Minutes  Total Treatment Time: 10    Plan  Diet Recommendations: regular solids and thin liquids, meds PO    Discharge Plan:  TBD  Discussed with RNDiamond within reach.     Electronically Signed by:  Sarbjit Dorsey M.A., [address]   Speech-Language Pathologist  Pager #[phone]

## 2022-06-25 NOTE — PLAN OF CARE
Problem: Discharge Planning  Goal: Discharge to home or other facility with appropriate resources  Outcome: Progressing  Flowsheets  Taken 6/25/2022 0800 by Kel Hancock RN  Discharge to home or other facility with appropriate resources:   Identify barriers to discharge with patient and caregiver   Arrange for needed discharge resources and transportation as appropriate   Identify discharge learning needs (meds, wound care, etc)   Refer to discharge planning if patient needs post-hospital services based on physician order or complex needs related to functional status, cognitive ability or social support system  Problem: Pain  Goal: Verbalizes/displays adequate comfort level or baseline comfort level  Outcome: Progressing     Problem: Safety - Adult  Goal: Free from fall injury  Outcome: Progressing  Flowsheets (Taken 6/25/2022 0800)  Free From Fall Injury:   Instruct family/caregiver on patient safety   Based on caregiver fall risk screen, instruct family/caregiver to ask for assistance with transferring infant if caregiver noted to have fall risk factors     Problem: Chronic Conditions and Co-morbidities  Goal: Patient's chronic conditions and co-morbidity symptoms are monitored and maintained or improved  Outcome: Progressing  Flowsheets (Taken 6/25/2022 0800)  Care Plan - Patient's Chronic Conditions and Co-Morbidity Symptoms are Monitored and Maintained or Improved:   Monitor and assess patient's chronic conditions and comorbid symptoms for stability, deterioration, or improvement   Collaborate with multidisciplinary team to address chronic and comorbid conditions and prevent exacerbation or deterioration   Update acute care plan with appropriate goals if chronic or comorbid symptoms are exacerbated and prevent overall improvement and discharge     Problem: ABCDS Injury Assessment  Goal: Absence of physical injury  Outcome: Progressing     Problem: Neurosensory - Adult  Goal: Achieves stable or improved neurological status  Outcome: Progressing  Goal: Absence of seizures  Outcome: Progressing  Goal: Remains free of injury related to seizures activity  Outcome: Progressing  Goal: Achieves maximal functionality and self care  Outcome: Progressing     Problem: Respiratory - Adult  Goal: Achieves optimal ventilation and oxygenation  Outcome: Progressing     Problem: Cardiovascular - Adult  Goal: Maintains optimal cardiac output and hemodynamic stability  Outcome: Progressing  Goal: Absence of cardiac dysrhythmias or at baseline  Outcome: Progressing     Problem: Skin/Tissue Integrity - Adult  Goal: Skin integrity remains intact  Outcome: Progressing  Goal: Incisions, wounds, or drain sites healing without S/S of infection  Outcome: Progressing  Goal: Oral mucous membranes remain intact  Outcome: Progressing     Problem: Musculoskeletal - Adult  Goal: Return mobility to safest level of function  Outcome: Progressing  Goal: Maintain proper alignment of affected body part  Outcome: Progressing  Goal: Return ADL status to a safe level of function  Outcome: Progressing     Problem: Gastrointestinal - Adult  Goal: Minimal or absence of nausea and vomiting  Outcome: Progressing  Goal: Maintains or returns to baseline bowel function  Outcome: Progressing  Goal: Maintains adequate nutritional intake  Outcome: Progressing  Goal: Establish and maintain optimal ostomy function  Outcome: Progressing     Problem: Genitourinary - Adult  Goal: Absence of urinary retention  Outcome: Progressing     Problem: Infection - Adult  Goal: Absence of infection at discharge  Outcome: Progressing  Goal: Absence of infection during hospitalization  Outcome: Progressing     Problem: Metabolic/Fluid and Electrolytes - Adult  Goal: Electrolytes maintained within normal limits  Outcome: Progressing  Goal: Hemodynamic stability and optimal renal function maintained  Outcome: Progressing  Goal: Glucose maintained within prescribed range  Outcome: Progressing     Problem: Hematologic - Adult  Goal: Maintains hematologic stability  Outcome: Progressing

## 2022-06-25 NOTE — PROGRESS NOTES
Heparin gtt started via L FA PIV at 12units/kg/hour, no bolus. AntiXa scheduled for 6hours post start of infusion. Will continue to monitor.

## 2022-06-25 NOTE — PROGRESS NOTES
Progress Note    Admit Date: 6/25/2022  Day: 1  Diet: Diet NPO    CC: Dysarthria, right hand/arm numbness and weakness    Interval history: Patient states his symptoms have resolved. His word finding ability has improved. MRI brain resulted with acute infarct in left middle cerebral artery territory in the left frontal, left parietal and temporal parietal lobe cortex. MRA with critical stenosis of left ICA. Patient denies any other complaints. HPI: Petey Lance is a 75 y.o. male with PMH as below notable for HTN, alcohol abuse, tobacco use who presented with dysarthria. Time of onset was reported at 12:20 PM. Patient report he was working when he started having slurred speech, patient reports he started also feeling numbness in his R arm. Patient decided to consult. Patient reports his symptoms lasted for approximately 3 hours. Patient denies current alcohol use. He states he smokes 50 packs/year history, currently smokes 1 pack. Lives at home with his family.      The patient denies abdominal or flank pain, anorexia, nausea or vomiting, dysphagia, change in bowel habits or black or bloody stools or weight loss. Patient denies any exertional chest pain, dyspnea, palpitations, syncope, orthopnea, edema or paroxysmal nocturnal dyspnea. The patient denies dysuria, frequency or hematuria. He denies constitutional symptoms of fatigue, weakness, weight loss or gain, fevers, night sweats.      On admission patient was hemodynamically stable and afebrile. Patient was AOx4 with dysarthria, no aphasia, R arm weakness with positive drift, also R sided facial droop. NIH 4. WBC 12. EKG did not show any acute ischemic changes. Trop neg. CXR did not show any acute cardiopulmonary abnormality. CT head did not show any acute intracranial abnormality. CTA head showed critical stenosis at the origin of the left internal carotid artery.  Thrombus projecting into the lumen at the origin of the left internal carotid artery, at risk for contributing to distal emboli.  stroke team was consulted. Patient not candidate for tPA due to symptoms outside window. NSGY was consulted recommended heparin drip and MR based non invasive vascular imaging to confirm presence of intraluminal thrombus within the cervical left ICA origin.      Patient was admitted to the ICU for further workup and management of ischemic stroke vs TIA. Medications:     Scheduled Meds:   rosuvastatin  40 mg Oral Nightly     Continuous Infusions:  PRN Meds:ondansetron **OR** ondansetron, polyethylene glycol, perflutren lipid microspheres    Objective:   Vitals:   T-max:  Patient Vitals for the past 8 hrs:   BP Temp Temp src Pulse Resp SpO2 Height Weight   06/25/22 1100 (!) 124/59 -- -- (!) 44 15 99 % -- --   06/25/22 1000 (!) 141/74 -- -- 50 16 99 % -- --   06/25/22 0800 138/67 97.5 °F (36.4 °C) Oral (!) 47 16 100 % -- --   06/25/22 0700 (!) 130/55 -- -- 54 18 100 % -- --   06/25/22 0600 129/60 -- -- 53 16 -- -- --   06/25/22 0554 (!) 147/69 98 °F (36.7 °C) Oral 59 19 100 % 5' 9\" (1.753 m) 141 lb 8.6 oz (64.2 kg)     No intake or output data in the 24 hours ending 06/25/22 1223    Review of Systems   Constitutional: Negative for chills, fatigue and fever. HENT: Negative for congestion. Respiratory: Negative for cough, shortness of breath and wheezing. Cardiovascular: Negative for chest pain and palpitations. Gastrointestinal: Negative for abdominal distention, abdominal pain, diarrhea, nausea and vomiting. Genitourinary: Negative for dysuria. Neurological: Negative for dizziness, syncope, weakness, light-headedness and numbness. Physical Exam  Constitutional:       General: He is not in acute distress. Appearance: He is well-developed. He is not diaphoretic. HENT:      Head: Normocephalic and atraumatic. Cardiovascular:      Rate and Rhythm: Normal rate and regular rhythm. Heart sounds: Normal heart sounds. No murmur heard.       Pulmonary: Effort: Pulmonary effort is normal. No respiratory distress. Breath sounds: Normal breath sounds. No wheezing. Abdominal:      General: Bowel sounds are normal. There is no distension. Palpations: Abdomen is soft. Tenderness: There is no abdominal tenderness. Skin:     General: Skin is warm and dry. Capillary Refill: Capillary refill takes less than 2 seconds. Findings: No erythema. Neurological:      Mental Status: He is alert and oriented to person, place, and time. Psychiatric:         Behavior: Behavior normal.         LABS:    CBC:   Recent Labs     06/24/22 2123   WBC 12.4*   HGB 12.4*   HCT 37.6*      MCV 81.6     Renal:    Recent Labs     06/24/22 2123      K 3.7      CO2 26   BUN 11   CREATININE 0.9   GLUCOSE 108*   CALCIUM 9.4   ANIONGAP 11     Hepatic:   Recent Labs     06/24/22 2123   AST 20   ALT 12   BILITOT 0.5   PROT 7.7   LABALBU 4.4   ALKPHOS 101     Troponin:   Recent Labs     06/24/22 2123   TROPONINI <0.01     BNP: No results for input(s): BNP in the last 72 hours. Lipids: No results for input(s): CHOL, HDL in the last 72 hours. Invalid input(s): LDLCALCU, TRIGLYCERIDE  ABGs:  No results for input(s): PHART, RXF7EPF, PO2ART, YBW1ZQA, BEART, THGBART, W8QCIFOA, AYF7OCJ in the last 72 hours. INR:   Recent Labs     06/24/22 2123   INR 1.12     Lactate: No results for input(s): LACTATE in the last 72 hours. Cultures:  -----------------------------------------------------------------  RAD:   MRA NECK W WO CONTRAST   Final Result      1. Critical stenosis (greater than 90% NASCET criteria) of the left internal carotid artery origin related to densely calcified plaque when correlating with CTA appearance with additional contiguous noncalcified intraluminal filling defect suspicious for    thrombus in the proximal bulbar segment of the left internal carotid artery   2. No right carotid artery stenosis   3.  No vertebral artery stenosis MRA HEAD WO CONTRAST   Final Result      No intracranial large vessel occlusion or significant stenosis         MRI BRAIN WO CONTRAST   Final Result   1. Multiple foci of restricted diffusion, acute infarct in left middle cerebral artery territory in the left frontal, left parietal and temporal parietal lobe cortex. 2. Underlying moderate to severe chronic small vessel disease, periventricular microangiopathic leukoencephalopathy. Assessment/Plan:   TIA in the context of left ICA origin stenosis with concern of intraluminal thrombus formation  Presented with dysarthria, no aphasia, R arm weakness with positive drift, also R sided facial droop. CT head did not show any acute intracranial abnormality. CTA head showed critical stenosis at the origin of the left internal carotid artery. Thrombus projecting into the lumen at the origin of the left internal carotid artery, at risk for contributing to distal emboli. - q1h neurochecks, NPO, SLP/PT/OT, NSGY consult   - MRA head normal  - MRA neck showing \"critical stenosis of L ICA, and filling defect suspicious for thrombus in the proximal bulbar segment of the L ICA\"  - MRI brain \"Multiple foci of restricted diffusion, acute infarct in left middle cerebral artery territory in the left frontal, left parietal and temporal parietal lobe cortex. \"  - Will discuss with NSGY about intervention and heparin gtt  - Permissive HTN BP goal <210/120   - Keep HOB >30 degrees  - Statin  - Has allergy to ASA so will start plavix instead  - Echo w/ bubble pending  - Lipid, Hb A1c pending  - Neurology consulted   - repeat CT and contact NSGY for any changes in neuro exam     Leukocytosis likely reactive  -Monitor VS  -F/u CBC     Tobacco use  -Avoid nicotine patch     Code Status: Full Code  FEN: Diet NPO  PPX: SCDs  DISPO: ICU, pending neuology clearance    Diana Gallagher MD, PGY-3  06/25/22  12:23 PM    This patient has been staffed and discussed with Dr. Lokesh Bentley. Patient seen, examined and discussed with the resident and I agree with the assessment and plan as edited above. Patient presented with symptoms of a left sided stroke that resolved. However MRI shows stroke and thrombus in LICA  While he says his symptoms have resolved they appear to be waxing and waning as he was transiently dysarthric with a facial droop and had trouble using his right hand to answer his iphone when I saw him. He hadn't received the plavix yet and the heparin was about to be started. Will get his dose of plavix now and start heparin. Continue hourly neuro-checks as his situation is clearly tenuous.     Josue Lawler MD

## 2022-06-25 NOTE — CONSULTS
Clinical Pharmacy Progress Note  Medication History      Asked to verify home medications by Dr. Marna Severin. List of of current medications patient is taking is complete. Home Medication list in EPIC updated to reflect changes noted below. Source of information: RN interview with patient on admission,  [facility]. [facility] - [phone]), PCP office notes      Changes made to home medication list:   Medications removed (no longer taking):  · Medrol dosepak - prescribed in early March 2022 for mass on floor of mouth     Medications added:   · None     Medication doses / instructions adjusted:   · None     Other notes:    Pt states he doesn't remember last time he took Sildenafil for ED. Last filled 2/10/22 for 10 tablets. Left on profile with last dose time of \"unknown\".      Please call with questions--  Thanks--  Fariad Mena, PharmD, BCPS, [facility]  [id number]   6/25/2022 4:00 PM      Current Outpatient Medications   Medication Instructions    [place]  mg, Oral, DAILY    KRILL OIL PO Oral    methylPREDNISolone (MEDROL DOSEPACK) 4 MG tablet Take by mouth as instructed    Multiple Vitamins-Minerals (THERAPEUTIC MULTIVITAMIN-MINERALS) tablet 1 tablet, Oral, DAILY    omeprazole (PRILOSEC) 40 MG delayed release capsule TAKE 1 CAPSULE BY MOUTH EVERY DAY    sildenafil (VIAGRA) 100 mg, Oral, PRN

## 2022-06-25 NOTE — ED PROVIDER NOTES
[facility]        Pt Name: Lacretia Jeans  MRN: [id number]  [place] 1955  Date of evaluation: 6/24/2022  Provider: YASMANY Pina - KYLE  PCP: Keven Trammell MD  Note Started: 9:20 PM EDT        I have seen and evaluated this patient with my supervising physician Waqas Cantu MD.    CHIEF COMPLAINT       Chief Complaint   Patient presents with    Numbness     pt wife states pt comes home from work in afternoon, pt states he was at work when noticed speech changes around 1220pm today. pt drove home, was asleep when wife arrived at [address] [place] [address]   (Location, Timing/Onset, Context/Setting, Quality, Duration, Modifying Factors, Severity, Associated Signs and Symptoms)  Note limiting factors. Chief Complaint: stroke     Lacretia Jeans is a 75 y.o. male who presents to the ER with complaint of stroke    Patient is accompanied by his wife teja to the emergency department. He is ambulatory into the hospital, wife reports Shirley Mcqueen is having a stroke\". I did move the patient immediately to triage a and assessed him, called stroke alert. Patient reports time of onset at 12:20 PM while at work with slurred speech. The patient is awake, alert, oriented x4 with dysarthria, no aphasia. He does have some right arm weakness with positive drift. He is right-hand dominant. Also noted right-sided facial droop. Patient denies history of the same. No alcohol today. Patient's wife reports that she saw him last normal this morning before leaving for work. He does drive. He works every day. Symptoms have not resolved since time of onset. Denies any headache, fever, lightheadedness, dizziness, visual disturbances. No chest pain or pressure. No neck or back pain. No shortness of breath, cough, or congestion. No abdominal pain, nausea, vomiting, diarrhea, constipation, or dysuria.   No rash.    Nursing Notes were all reviewed and agreed with or any disagreements were addressed in the HPI. REVIEW OF SYSTEMS    (2-9 systems for level 4, 10 or more for level 5)     Review of Systems   Constitutional: Negative for activity change, chills and fever. Respiratory: Negative for chest tightness and shortness of breath. Cardiovascular: Negative for chest pain. Gastrointestinal: Negative for abdominal pain, diarrhea, nausea and vomiting. Genitourinary: Negative for dysuria. Neurological: Positive for speech difficulty and weakness. All other systems reviewed and are negative. Positives and Pertinent negatives as per HPI. Except as noted above in the ROS, all other systems were reviewed and negative.        PAST MEDICAL HISTORY     Past Medical History:   Diagnosis Date    Arthritis     GERD (gastroesophageal reflux disease)     Hyperlipidemia     Hyperlipidemia 5/27/2012    Incomplete RBBB     Ruptured disk 2019    Varicose veins of both lower extremities          SURGICAL HISTORY     Past Surgical History:   Procedure Laterality Date    COLONOSCOPY  9/9/2020    COLONOSCOPY WITH BIOPSY performed by Manuel Smith MD at [address] Bilateral 1/31/2022    BILATERAL ENDOVENOUS [address], RIGHT ENDOVENOUS [address], ([id number], [id number]) performed by Clinton Valle MD at [facility] N/A 9/9/2020    EGD BIOPSY performed by Manuel Smith MD at [facility] N/A 9/9/2020    EGD DILATION BALLOON performed by Manuel Smith MD at [facility] Bilateral 1/31/2022    BILATERAL STAB PHLEBECTOMIES performed by Clinton Valle MD at [facility]       Previous Medications    HORSE CHESTNUT  MG CPCR    Take 300 mg by mouth daily    KRILL OIL PO    Take by mouth    METHYLPREDNISOLONE (MEDROL DOSEPACK) 4 MG TABLET    Take by mouth as instructed    MULTIPLE VITAMINS-MINERALS (THERAPEUTIC MULTIVITAMIN-MINERALS) TABLET    Take 1 tablet by mouth daily    OMEPRAZOLE (PRILOSEC) 40 MG DELAYED RELEASE CAPSULE    TAKE 1 CAPSULE BY MOUTH EVERY DAY    SILDENAFIL (VIAGRA) 100 MG TABLET    Take 1 tablet by mouth as needed for Erectile Dysfunction         ALLERGIES     Pcn [penicillins]    FAMILYHISTORY       Family History   Problem Relation Age of Onset    Breast Cancer Mother 61          SOCIAL HISTORY       Social History     Tobacco Use    Smoking status: Current Every Day Smoker     Packs/day: 0.50     Years: 54.00     Pack years: 27.00     Types: Cigarettes    Smokeless tobacco: Never Used   Vaping Use    Vaping Use: Never used   Substance Use Topics    Alcohol use: Not Currently     Comment: Pt reported that he stopped drinking 15 years ago.  Drug use: Not Currently     Types: Opiates , Cocaine     Comment: Pt reported that he's been clean for 15 years. SCREENINGS   NIH Stroke Scale  Interval: Baseline  Level of Consciousness (1a): Alert  LOC Questions (1b): Answers both correctly  LOC Commands (1c): Performs both tasks correctly  Best Gaze (2): Normal  Visual (3): No visual loss  Facial Palsy (4): (!) Minor paralysis  Motor Arm, Left (5a): No drift  Motor Arm, Right (5b): No drift  Motor Leg, Left (6a): No drift  Motor Leg, Right (6b):  No drift  Limb Ataxia (7): Absent  Sensory (8): (!) Mild to Moderate  Best Language (9): Mild to moderate aphasia  Dysarthria (10): Mild to moderate, slurs some words  Extinction and Inattention (11): No abnormality  Total: 4Glasgow Coma Scale  Eye Opening: Spontaneous  Best Verbal Response: Oriented  Best Motor Response: Obeys commands  Kendrick Coma Scale Score: 15        PHYSICAL EXAM    (up to 7 for level 4, 8 or more for level 5)     ED Triage Vitals [06/24/22 2110]   BP Temp Temp src Heart Rate Resp SpO2 Height Weight   (!) 142/64 97.9 °F (36.6 °C) -- 71 18 98 % 5' 9\" (1.753 m) 151 lb 8 oz (68.7 kg)       Physical Exam  Vitals and nursing note reviewed. Constitutional:       Appearance: He is well-developed. He is not diaphoretic. HENT:      Head: Normocephalic and atraumatic. Right Ear: External ear normal.      Left Ear: External ear normal.   Eyes:      General:         Right eye: No discharge. Left eye: No discharge. Neck:      Vascular: No JVD. Cardiovascular:      Rate and Rhythm: Normal rate. Pulses: Normal pulses. Pulmonary:      Effort: Pulmonary effort is normal. No respiratory distress. Abdominal:      Palpations: Abdomen is soft. Tenderness: There is no abdominal tenderness. Musculoskeletal:         General: Normal range of motion. Cervical back: Normal range of motion and neck supple. Skin:     General: Skin is warm and dry. Coloration: Skin is not pale. Neurological:      Mental Status: He is alert and oriented to person, place, and time. Cranial Nerves: Dysarthria and facial asymmetry present. Sensory: Sensation is intact. Motor: Weakness present.       Coordination: Finger-Nose-Finger Test normal.      Comments: Right sided arm drift   Psychiatric:         Behavior: Behavior normal.         DIAGNOSTIC RESULTS   LABS:    Labs Reviewed   CBC WITH AUTO DIFFERENTIAL - Abnormal; Notable for the following components:       Result Value    WBC 12.4 (*)     Hemoglobin 12.4 (*)     Hematocrit 37.6 (*)     Neutrophils Absolute 10.5 (*)     All other components within normal limits   COMPREHENSIVE METABOLIC PANEL - Abnormal; Notable for the following components:    Glucose 108 (*)     All other components within normal limits   POCT GLUCOSE - Abnormal; Notable for the following components:    POC Glucose 107 (*)     All other components within normal limits   TROPONIN   PROTIME-INR   URINALYSIS WITH REFLEX TO CULTURE       When ordered only abnormal lab results are displayed. All other labs were within normal range or not returned as of this dictation. EKG: When ordered, EKG's are interpreted by the Emergency Department Physician in the absence of a cardiologist.  Please see their note for interpretation of EKG. RADIOLOGY:   Non-plain film images such as CT, Ultrasound and MRI are read by the radiologist. Plain radiographic images are visualized and preliminarily interpreted by the ED Provider with the below findings:        Interpretation per the Radiologist below, if available at the time of this note:    XR CHEST PORTABLE   Final Result   No evidence of acute process. Hyperinflated lungs. CTA HEAD NECK W CONTRAST   Final Result   Critical stenosis at the origin of the left internal carotid artery. Thrombus projecting into the lumen at the origin of the left internal carotid   artery, at risk for contributing to distal emboli. Otherwise, no acute abnormality or flow-limiting stenosis in the remainder of   the major arteries of the head and neck. CT HEAD WO CONTRAST   Final Result   No acute intracranial abnormality. Minimal parenchymal volume loss. Mild chronic microvascular disease. No results found. PROCEDURES   Unless otherwise noted below, none     Procedures    CRITICAL CARE TIME   The total critical care time spent while evaluating and treating this patient was at least 65 minutes. This excludes time spent doing separately billable procedures. This includes time at the bedside, data interpretation, medication management, obtaining critical history from collateral sources if the patient is unable to provide it directly, and multiple physician consultations with repeat consultations. Specifics of interventions taken and potentially life-threatening diagnostic considerations are listed above in the medical decision making.       CONSULTS:  None      EMERGENCY DEPARTMENT COURSE and DIFFERENTIAL DIAGNOSIS/MDM: Vitals:    Vitals:    06/25/22 0030 06/25/22 0037 06/25/22 0045 06/25/22 0048   BP: (!) 146/74  126/73    Pulse: 50 (!) 48 50 51   Resp: 15 (!) 9 12 13   Temp:       SpO2: 95% 95% 94% 94%   Weight:       Height:           Patient was given the following medications:  Medications   iopamidol (ISOVUE-370) 76 % injection 75 mL (75 mLs IntraVENous Given 6/24/22 2124)         Is this patient to be included in the SEP-1 Core Measure due to severe sepsis or septic shock? No   Exclusion criteria - the patient is NOT to be included for SEP-1 Core Measure due to: Infection is not suspected    Briefly, this is a 75year old male that presents with complaint of stroke. Time of onset at 1220 this afternoon. Symptoms have been constant since time of onset. NIH stroke scale 4    Medical history including alcohol abuse, hepatitis C, GERD, drug abuse in remission, incomplete right bundle branch block, prediabetes, hypertension, cigarette smoker. I did talk with DR. Hernandez,  stroke team, and he did state the patient is not a candidate for tPA as symptoms are outside of the window. CT head, CTA head and neck were completed prior to patient being taken to room 21. CBC shows a leukocytosis with a white count of 12.4. CMP shows glucose of 108. Troponin negative. PT/INR within normal limits. XR CHEST PORTABLE (Final result)  Result time 06/24/22 22:19:07  Final result by Devorah Markham MD (06/24/22 22:19:07)                Impression:    No evidence of acute process. Hyperinflated lungs. CT HEAD WO CONTRAST (Final result)  Result time 06/24/22 21:27:50  Final result by Katie Julien MD (06/24/22 21:27:50)                Impression:    No acute intracranial abnormality. Minimal parenchymal volume loss. Mild chronic microvascular disease.                CTA HEAD NECK W CONTRAST (Final result)  Result time 06/24/22 22:02:59  Final result by Katie Julien MD (06/24/22 22:02:59) Impression:    Critical stenosis at the origin of the left internal carotid artery. Thrombus projecting into the lumen at the origin of the left internal carotid   artery, at risk for contributing to distal emboli. Otherwise, no acute abnormality or flow-limiting stenosis in the remainder of   the major arteries of the head and neck. [facility] spoke with Dr. Meryl Bal. Right-sided facial droop, dysarthria, mild sensory impairment, and right-sided arm weakness. I did speak with Dr. Meryl Bal- he did recommend that I speak with the endovascular interventionalist, I then spoke with DR. Keren Maldonado, he would like a timely MRA of brain with contrast to better evaluate concern of thrombus in internal carotid. Since this imaging could not be completed timely at [facility], he does recommend admission to [facility], [facility] request that I speak with the ICU doctor for ICU admission. The endovascular specialist will place orders for the MRI of brain with contrast and any other imaging studies that he requires. States that the patient will also be followed closely by the neurology team.    Awaiting callback from hospitalist team at [facility], patient accepted to ICU. Transport is unable to arrive quickly, I did ask transfer center to contact endovascular specialist to clarify if patient would require immediate transport, of transfer record note, \"he doesn't need ETA because pt isn't getting any emergent intervention\". [facility] to arrive at 0400. FINAL IMPRESSION      1. Cerebrovascular accident (CVA), unspecified mechanism ([address]    2. Stenosis of left internal carotid artery          DISPOSITION/PLAN   DISPOSITION        PATIENT REFERRED TO:  No follow-up provider specified.     DISCHARGE MEDICATIONS:  New Prescriptions    No medications on file       DISCONTINUED MEDICATIONS:  Discontinued Medications    No medications on file              (Please note that portions of this note were completed with a voice recognition program.  Efforts were made to edit the dictations but occasionally words are mis-transcribed.)    YASMANY Grey CNP (electronically signed)           YASMANY Grey CNP  06/25/22 [address] [facility] Gillermina Councilman, APRN - CNP  06/25/22 [id number]

## 2022-06-25 NOTE — PROGRESS NOTES
Neuro critical care NP  Damaris at bedside. Ready to transport to MRi/MRa via wheelchair, on monitor.

## 2022-06-26 LAB
ALBUMIN SERPL-MCNC: 3.8 G/DL (ref 3.4–5)
ANION GAP SERPL CALCULATED.3IONS-SCNC: 12 MMOL/L (ref 3–16)
ANTI-XA UNFRAC HEPARIN: 0.11 IU/ML (ref 0.3–0.7)
ANTI-XA UNFRAC HEPARIN: 0.2 IU/ML (ref 0.3–0.7)
ANTI-XA UNFRAC HEPARIN: 0.3 IU/ML (ref 0.3–0.7)
ANTI-XA UNFRAC HEPARIN: 0.43 IU/ML (ref 0.3–0.7)
ANTI-XA UNFRAC HEPARIN: >1.1 IU/ML (ref 0.3–0.7)
BASOPHILS ABSOLUTE: 0.1 K/UL (ref 0–0.2)
BASOPHILS RELATIVE PERCENT: 0.7 %
BUN BLDV-MCNC: 14 MG/DL (ref 7–20)
CALCIUM SERPL-MCNC: 9.1 MG/DL (ref 8.3–10.6)
CHLORIDE BLD-SCNC: 104 MMOL/L (ref 99–110)
CHOLESTEROL, TOTAL: 145 MG/DL (ref 0–199)
CO2: 20 MMOL/L (ref 21–32)
CREAT SERPL-MCNC: 0.8 MG/DL (ref 0.8–1.3)
EOSINOPHILS ABSOLUTE: 0.3 K/UL (ref 0–0.6)
EOSINOPHILS RELATIVE PERCENT: 2.3 %
ESTIMATED AVERAGE GLUCOSE: 114 MG/DL
GFR AFRICAN AMERICAN: >60
GFR NON-AFRICAN AMERICAN: >60
GLUCOSE BLD-MCNC: 77 MG/DL (ref 70–99)
HBA1C MFR BLD: 5.6 %
HCT VFR BLD CALC: 38.9 % (ref 40.5–52.5)
HDLC SERPL-MCNC: 55 MG/DL (ref 40–60)
HEMOGLOBIN: 12.7 G/DL (ref 13.5–17.5)
LDL CHOLESTEROL CALCULATED: 79 MG/DL
LYMPHOCYTES ABSOLUTE: 3.5 K/UL (ref 1–5.1)
LYMPHOCYTES RELATIVE PERCENT: 30.1 %
MAGNESIUM: 2 MG/DL (ref 1.8–2.4)
MCH RBC QN AUTO: 26.9 PG (ref 26–34)
MCHC RBC AUTO-ENTMCNC: 32.6 G/DL (ref 31–36)
MCV RBC AUTO: 82.6 FL (ref 80–100)
MONOCYTES ABSOLUTE: 0.8 K/UL (ref 0–1.3)
MONOCYTES RELATIVE PERCENT: 6.7 %
NEUTROPHILS ABSOLUTE: 7.1 K/UL (ref 1.7–7.7)
NEUTROPHILS RELATIVE PERCENT: 60.2 %
PDW BLD-RTO: 14.5 % (ref 12.4–15.4)
PHOSPHORUS: 3.3 MG/DL (ref 2.5–4.9)
PLATELET # BLD: 202 K/UL (ref 135–450)
PMV BLD AUTO: 8.8 FL (ref 5–10.5)
POTASSIUM SERPL-SCNC: 4.3 MMOL/L (ref 3.5–5.1)
RBC # BLD: 4.71 M/UL (ref 4.2–5.9)
SODIUM BLD-SCNC: 136 MMOL/L (ref 136–145)
TRIGL SERPL-MCNC: 57 MG/DL (ref 0–150)
VLDLC SERPL CALC-MCNC: 11 MG/DL
WBC # BLD: 11.7 K/UL (ref 4–11)

## 2022-06-26 PROCEDURE — 85520 HEPARIN ASSAY: CPT

## 2022-06-26 PROCEDURE — 97530 THERAPEUTIC ACTIVITIES: CPT

## 2022-06-26 PROCEDURE — 6370000000 HC RX 637 (ALT 250 FOR IP)

## 2022-06-26 PROCEDURE — 36415 COLL VENOUS BLD VENIPUNCTURE: CPT

## 2022-06-26 PROCEDURE — 97165 OT EVAL LOW COMPLEX 30 MIN: CPT

## 2022-06-26 PROCEDURE — 97162 PT EVAL MOD COMPLEX 30 MIN: CPT

## 2022-06-26 PROCEDURE — 83036 HEMOGLOBIN GLYCOSYLATED A1C: CPT

## 2022-06-26 PROCEDURE — 99232 SBSQ HOSP IP/OBS MODERATE 35: CPT | Performed by: INTERNAL MEDICINE

## 2022-06-26 PROCEDURE — 80061 LIPID PANEL: CPT

## 2022-06-26 PROCEDURE — 80069 RENAL FUNCTION PANEL: CPT

## 2022-06-26 PROCEDURE — 85025 COMPLETE CBC W/AUTO DIFF WBC: CPT

## 2022-06-26 PROCEDURE — 6360000002 HC RX W HCPCS: Performed by: STUDENT IN AN ORGANIZED HEALTH CARE EDUCATION/TRAINING PROGRAM

## 2022-06-26 PROCEDURE — 2000000000 HC ICU R&B

## 2022-06-26 PROCEDURE — 6370000000 HC RX 637 (ALT 250 FOR IP): Performed by: STUDENT IN AN ORGANIZED HEALTH CARE EDUCATION/TRAINING PROGRAM

## 2022-06-26 PROCEDURE — 97116 GAIT TRAINING THERAPY: CPT

## 2022-06-26 PROCEDURE — 83735 ASSAY OF MAGNESIUM: CPT

## 2022-06-26 RX ORDER — PANTOPRAZOLE SODIUM 40 MG/1
40 TABLET, DELAYED RELEASE ORAL
Status: DISCONTINUED | OUTPATIENT
Start: 2022-06-26 | End: 2022-06-30 | Stop reason: HOSPADM

## 2022-06-26 RX ORDER — PANTOPRAZOLE SODIUM 40 MG/10ML
40 INJECTION, POWDER, LYOPHILIZED, FOR SOLUTION INTRAVENOUS DAILY
Status: DISCONTINUED | OUTPATIENT
Start: 2022-06-26 | End: 2022-06-26

## 2022-06-26 RX ADMIN — PANTOPRAZOLE SODIUM 40 MG: 40 TABLET, DELAYED RELEASE ORAL at 10:27

## 2022-06-26 RX ADMIN — ROSUVASTATIN CALCIUM 40 MG: 20 TABLET, COATED ORAL at 21:20

## 2022-06-26 RX ADMIN — HEPARIN SODIUM 18 UNITS/KG/HR: 10000 INJECTION, SOLUTION INTRAVENOUS at 15:27

## 2022-06-26 ASSESSMENT — ENCOUNTER SYMPTOMS
ABDOMINAL DISTENTION: 0
SHORTNESS OF BREATH: 0
VOMITING: 0
DIARRHEA: 0
COUGH: 0
ABDOMINAL PAIN: 0
NAUSEA: 0
WHEEZING: 0

## 2022-06-26 ASSESSMENT — PAIN SCALES - GENERAL
PAINLEVEL_OUTOF10: 0

## 2022-06-26 NOTE — PLAN OF CARE
Problem: Discharge Planning  Goal: Discharge to home or other facility with appropriate resources  6/26/2022 1141 by Rob Keith RN  Outcome: Progressing  Flowsheets (Taken 6/26/2022 0800)  Discharge to home or other facility with appropriate resources:   Arrange for needed discharge resources and transportation as appropriate   Identify barriers to discharge with patient and caregiver   Identify discharge learning needs (meds, wound care, etc)   Refer to discharge planning if patient needs post-hospital services based on physician order or complex needs related to functional status, cognitive ability or social support system    Problem: Pain  Goal: Verbalizes/displays adequate comfort level or baseline comfort level  6/26/2022 1141 by Rob Keith RN  Outcome: Progressing       Problem: Safety - Adult  Goal: Free from fall injury  6/26/2022 1141 by Rob Keith RN  Outcome: Progressing  Flowsheets (Taken 6/26/2022 0839)  Free From Fall Injury:   Instruct family/caregiver on patient safety   Based on caregiver fall risk screen, instruct family/caregiver to ask for assistance with transferring infant if caregiver noted to have fall risk factors    Problem: Chronic Conditions and Co-morbidities  Goal: Patient's chronic conditions and co-morbidity symptoms are monitored and maintained or improved  6/26/2022 1141 by Rob Keith RN  Outcome: Progressing  Flowsheets (Taken 6/26/2022 0800)  Care Plan - Patient's Chronic Conditions and Co-Morbidity Symptoms are Monitored and Maintained or Improved:   Monitor and assess patient's chronic conditions and comorbid symptoms for stability, deterioration, or improvement   Collaborate with multidisciplinary team to address chronic and comorbid conditions and prevent exacerbation or deterioration   Update acute care plan with appropriate goals if chronic or comorbid symptoms are exacerbated and prevent overall improvement and discharge Problem: ABCDS Injury Assessment  Goal: Absence of physical injury  6/26/2022 1141 by Nina Torrez RN  Outcome: Progressing  Flowsheets (Taken 6/26/2022 1847)  Absence of Physical Injury: Implement safety measures based on patient assessment     Problem: Neurosensory - Adult  Goal: Achieves stable or improved neurological status  6/26/2022 1141 by Nina Torrez RN  Outcome: Progressing  Flowsheets (Taken 6/26/2022 0800)  Achieves stable or improved neurological status:   Assess for and report changes in neurological status   Initiate measures to prevent increased intracranial pressure   Maintain blood pressure and fluid volume within ordered parameters to optimize cerebral perfusion and minimize risk of hemorrhage   Monitor temperature, glucose, and sodium. Initiate appropriate interventions as ordered    Goal: Absence of seizures  6/26/2022 1141 by Nina Torrez RN  Outcome: Progressing  Flowsheets (Taken 6/26/2022 0800)  Absence of seizures:   Monitor for seizure activity.   If seizure occurs, document type and location of movements and any associated apnea   If seizure occurs, turn head to side and suction secretions as needed   Administer anticonvulsants as ordered   Support airway/breathing, administer oxygen as needed   Diagnostic studies as ordered    Goal: Remains free of injury related to seizures activity  6/26/2022 1141 by Nina Torrez RN  Outcome: Progressing  Flowsheets (Taken 6/26/2022 0800)  Remains free of injury related to seizure activity:   Maintain airway, patient safety  and administer oxygen as ordered   Monitor patient for seizure activity, document and report duration and description of seizure to Licensed Independent Practitioner   If seizure occurs, turn patient to side and suction secretions as needed   Reorient patient post seizure   Seizure pads on all 4 side rails   Instruct patient/family to notify RN of any seizure activity   Instruct patient/family to call for assistance with activity based on assessment    Goal: Achieves maximal functionality and self care  6/26/2022 1141 by Rob Keith RN  Outcome: Progressing  Flowsheets (Taken 6/26/2022 0800)  Achieves maximal functionality and self care:   Encourage and assist patient to increase activity and self care with guidance from physical therapy/occupational therapy   Monitor swallowing and airway patency with patient fatigue and changes in neurological status   Encourage visually impaired, hearing impaired and aphasic patients to use assistive/communication devices       Problem: Respiratory - Adult  Goal: Achieves optimal ventilation and oxygenation  6/26/2022 1141 by Rob Keith RN  Outcome: Progressing  Flowsheets (Taken 6/26/2022 0800)  Achieves optimal ventilation and oxygenation:   Assess for changes in respiratory status   Assess for changes in mentation and behavior   Position to facilitate oxygenation and minimize respiratory effort   Oxygen supplementation based on oxygen saturation or arterial blood gases   Initiate smoking cessation protocol as indicated   Encourage broncho-pulmonary hygiene including cough, deep breathe, incentive spirometry   Assess the need for suctioning and aspirate as needed   Assess and instruct to report shortness of breath or any respiratory difficulty   Respiratory therapy support as indicated    Problem: Cardiovascular - Adult  Goal: Maintains optimal cardiac output and hemodynamic stability  6/26/2022 1141 by Rob Keith RN  Outcome: Progressing  Flowsheets (Taken 6/26/2022 0800)  Maintains optimal cardiac output and hemodynamic stability:   Monitor blood pressure and heart rate   Monitor urine output and notify Licensed Independent Practitioner for values outside of normal range   Assess for signs of decreased cardiac output   Administer fluid and/or volume expanders as ordered   Administer vasoactive medications as ordered    Goal: Absence of cardiac dysrhythmias or at baseline  6/26/2022 1141 by Bella Cueva RN  Outcome: Progressing  Flowsheets (Taken 6/26/2022 0800)  Absence of cardiac dysrhythmias or at baseline:   Monitor cardiac rate and rhythm   Assess for signs of decreased cardiac output   Administer antiarrhythmia medication and electrolyte replacement as ordered       Problem: Skin/Tissue Integrity - Adult  Goal: Skin integrity remains intact  6/26/2022 1141 by Bella Cueva RN  Outcome: Progressing  Flowsheets  Taken 6/26/2022 0839  Skin Integrity Remains Intact:   Monitor for areas of redness and/or skin breakdown   Assess vascular access sites hourly   Every 4-6 hours minimum: Change oxygen saturation probe site  Taken 6/26/2022 0800  Skin Integrity Remains Intact:   Monitor for areas of redness and/or skin breakdown   Assess vascular access sites hourly   Every 4-6 hours minimum: Change oxygen saturation probe site    Goal: Incisions, wounds, or drain sites healing without S/S of infection  6/26/2022 1141 by Bella Cueva RN  Outcome: Progressing  Flowsheets  Taken 6/26/2022 0839  Incisions, Wounds, or Drain Sites Healing Without Sign and Symptoms of Infection:   ADMISSION and DAILY: Assess and document risk factors for pressure ulcer development   TWICE DAILY: Assess and document skin integrity   TWICE DAILY: Assess and document dressing/incision, wound bed, drain sites and surrounding tissue  Taken 6/26/2022 0800  Incisions, Wounds, or Drain Sites Healing Without Sign and Symptoms of Infection:   ADMISSION and DAILY: Assess and document risk factors for pressure ulcer development   TWICE DAILY: Assess and document skin integrity   TWICE DAILY: Assess and document dressing/incision, wound bed, drain sites and surrounding tissue    Goal: Oral mucous membranes remain intact  6/26/2022 1141 by Bella Cueva RN  Outcome: Progressing  Flowsheets  Taken 6/26/2022 0839  Oral Mucous Membranes Remain Intact:   Assess oral mucosa and hygiene practices   Implement preventative oral hygiene regimen   Implement oral medicated treatments as ordered  Taken 6/26/2022 0800  Oral Mucous Membranes Remain Intact:   Assess oral mucosa and hygiene practices   Implement preventative oral hygiene regimen   Implement oral medicated treatments as ordered       Problem: Musculoskeletal - Adult  Goal: Return mobility to safest level of function  6/26/2022 1141 by Erick Oconnor RN  Outcome: Progressing  Flowsheets (Taken 6/26/2022 0800)  Return Mobility to Safest Level of Function:   Assess patient stability and activity tolerance for standing, transferring and ambulating with or without assistive devices   Assist with transfers and ambulation using safe patient handling equipment as needed   Ensure adequate protection for wounds/incisions during mobilization   Obtain physical therapy/occupational therapy consults as needed   Instruct patient/family in ordered activity level    Goal: Maintain proper alignment of affected body part  6/26/2022 1141 by Erick Oconnor RN  Outcome: Progressing  Flowsheets (Taken 6/26/2022 0800)  Maintain proper alignment of affected body part:   Support and protect limb and body alignment per provider's orders   Instruct and reinforce with patient and family use of appropriate assistive device and precautions (e.g. spinal or hip dislocation precautions)    Goal: Return ADL status to a safe level of function  6/26/2022 1141 by Erick Oconnor RN  Outcome: Progressing  Flowsheets (Taken 6/26/2022 0800)  Return ADL Status to a Safe Level of Function:   Administer medication as ordered   Assess activities of daily living deficits and provide assistive devices as needed   Obtain physical therapy/occupational therapy consults as needed   Assist and instruct patient to increase activity and self care as tolerated     Problem: Gastrointestinal - Adult  Goal: Minimal or absence of nausea and vomiting  6/26/2022 1141 by Kel Hancock RN  Outcome: Progressing  Flowsheets (Taken 6/26/2022 0800)  Minimal or absence of nausea and vomiting:   Administer IV fluids as ordered to ensure adequate hydration   Administer ordered antiemetic medications as needed   Provide nonpharmacologic comfort measures as appropriate   Advance diet as tolerated, if ordered    Goal: Maintains or returns to baseline bowel function  6/26/2022 1141 by Kel Hancock RN  Outcome: Progressing  Flowsheets (Taken 6/26/2022 0800)  Maintains or returns to baseline bowel function:   Assess bowel function   Encourage oral fluids to ensure adequate hydration   Administer ordered medications as needed   Encourage mobilization and activity  Goal: Maintains adequate nutritional intake  6/26/2022 1141 by Kel Hancock RN  Outcome: Progressing  Flowsheets (Taken 6/26/2022 0800)  Maintains adequate nutritional intake:   Monitor percentage of each meal consumed   Identify factors contributing to decreased intake, treat as appropriate   Assist with meals as needed   Monitor intake and output, weight and lab values   Obtain nutritional consult as needed     Problem: Genitourinary - Adult  Goal: Absence of urinary retention  6/26/2022 1141 by Kel Hancock RN  Outcome: Progressing  Flowsheets (Taken 6/26/2022 0800)  Absence of urinary retention:   Assess patients ability to void and empty bladder   Monitor intake/output and perform bladder scan as needed     Problem: Infection - Adult  Goal: Absence of infection at discharge  6/26/2022 1141 by Kel Hancock RN  Outcome: Progressing  Flowsheets  Taken 6/26/2022 0839  Absence of infection at discharge:   Assess and monitor for signs and symptoms of infection   Monitor lab/diagnostic results   Monitor all insertion sites i.e., indwelling lines, tubes and drains   Administer medications as ordered   Instruct and encourage patient and family to use good hand hygiene technique   Identify and weight   Monitor urine specific gravity, serum osmolarity and serum sodium as indicated or ordered   Monitor response to interventions for patient's volume status, including labs, urine output, blood pressure (other measures as available)   Encourage oral intake as appropriate   Instruct patient on fluid and nutrition restrictions as appropriate    Goal: Glucose maintained within prescribed range  6/26/2022 1141 by Nelly Aase, RN  Outcome: Progressing  Flowsheets (Taken 6/26/2022 0800)  Glucose maintained within prescribed range:   Monitor blood glucose as ordered   Assess for signs and symptoms of hyperglycemia and hypoglycemia   Administer ordered medications to maintain glucose within target range   Assess barriers to adequate nutritional intake and initiate nutrition consult as needed   Instruct patient on self management of diabetes and initiate consult as needed    Problem: Hematologic - Adult  Goal: Maintains hematologic stability  6/26/2022 1141 by Nelly Aase, RN  Outcome: Progressing  Flowsheets (Taken 6/26/2022 0800)  Maintains hematologic stability:   Assess for signs and symptoms of bleeding or hemorrhage   Monitor labs for bleeding or clotting disorders   Administer blood products/factors as ordered

## 2022-06-26 NOTE — PROGRESS NOTES
Physical Therapy  Facility/Department: [address] [place] [address] ICU  Physical Therapy Initial Assessment and DISCHARGE    Name: Nicole Gonzales  : 1955  MRN: [id number]  Date of Service: 2022    Discharge Recommendations:  Nicole Gonzales scored a / on the AM-PAC short mobility form. At this time, no further PT is recommended upon discharge. Recommend patient returns to prior setting with prior services. PT Equipment Recommendations  Equipment Needed: No        Assessment   Assessment: Pt from home with acute CVA. Pt doing well from PT standpoint, demonstrating independent transfers and gait at supervision level. Dynamic standing balance is good. No neuro deficits noted on PT exam.  No further ongoing PT needs identified. Will sign off from acute PT services. Recommend continued ambulation/activity per RN staff for remainder of stay. Decision Making: Medium Complexity  Requires PT Follow-Up: No     Plan   Plan: Discharge with evaluation only    Safety Devices  Type of Devices: Left in bed,Call light within reach,Nurse notified (RN ok with no bed alarm in place)     Restrictions  Up as tolerated     Subjective   Chart Reviewed: Yes  Additional Pertinent Hx: Adm  with stroke like symptoms. CTA head/neck:Critical stenosis at the origin of the left internal carotid artery. Head CT (-). Neurosurg & neuology consulted. Outside window for tpa. PMH:  OA, GERD  Diagnosis: Acute CVA    Subjective  Subjective: Pt found supine in bed. Pt pleasant and agreeable for PT. Pt reports feeling back to normal.  Pt denies pain.     Social/Functional History  Lives With: Spouse  Type of Home: Condo (3rd floor)  Home Layout: One level  Home Access: Stairs to enter with rails (3 flights up to 3rd floor condo unit)  Bathroom Shower/Tub: Tub/Shower unit  Bathroom Toilet: Handicap height (sink for leverage)  Bathroom Equipment:  (None)  Home Equipment:  (None)  Has the patient had two or more falls in the past year or any fall with injury in the past year?: No  ADL Assistance: Independent  Ambulation Assistance: Independent  Transfer Assistance: Independent  Active : Yes  Occupation: Full time employment ([employer])  Leisure & Hobbies: watching TV  Additional Comments: Wife works    Vision/Hearing  Vision Exceptions: Wears glasses at all times  Hearing: Within functional limits      Cognition   Within Functional Limits     Objective   Gross Assessment  AROM: Within functional limits  Strength:  Within functional limits     Bed mobility  Supine to Sit: Independent  Sit to Supine: Independent     Transfers  Sit to Stand: Modified independent  Stand to sit: Modified independent     Ambulation  Device: No Device  Assistance: Supervision  Gait Deviations: None  Distance: 400ft    Stairs  # Steps : 3  Rails: Right ascending  Assistance: Supervision  Comment: # of stairs ambulated limited by IV pole    Balance  Sitting - Static: Good  Sitting - Dynamic: Good  Standing - Static: Good  Standing - Dynamic: Good      AM-PAC Score  AM-PAC Inpatient Mobility Raw Score : 24 (06/26/22 1011)  AM-PAC Inpatient T-Scale Score : 61.14 (06/26/22 1011)  Mobility Inpatient CMS 0-100% Score: 0 (06/26/22 1011)  Mobility Inpatient CMS G-Code Modifier : CH (06/26/22 1011)      Education  Patient Education  Education Given To: Patient  Education Provided: Role of Therapy  Education Method: Verbal  Barriers to Learning: None  Education Outcome: Verbalized understanding      Therapy Time   Individual Concurrent Group Co-treatment   Time In 0840         Time Out 0908         Minutes 28         Timed Code Treatment Minutes:   15  Total Treatment Minutes:  17[facility],

## 2022-06-26 NOTE — PROGRESS NOTES
AM assessment completed. Patient A&Ox4, pleasant and cooperative. NIHSS 0. No deficits noted on exam. Pt states he is tired, not sleeping much 2/2 hourly neuro assessments. L FA PIV infusing heparin gtt (see eMAR). Pt declines toileting, bathing at this time. In bed with alarm on. pt has call light. Next AntiXa ~1400. Will continue to monitor.

## 2022-06-26 NOTE — PROGRESS NOTES
Progress Note  Admit Date: 6/25/2022             75 y.o. male with PMH as below notable for HTN, alcohol abuse, tobacco use who presented with dysarthria. Time of onset was reported at 12:20 PM. Patient report he was working when he started having slurred speech, patient reports he started also feeling numbness in his R arm. Patient decided to consult. Patient reports his symptoms lasted for approximately 3 hours. Patient denies current alcohol use. He states he smokes 50 packs/year history, currently smokes 1 pack. Lives at home with his family.      The patient denies abdominal or flank pain, anorexia, nausea or vomiting, dysphagia, change in bowel habits or black or bloody stools or weight loss. Patient denies any exertional chest pain, dyspnea, palpitations, syncope, orthopnea, edema or paroxysmal nocturnal dyspnea. The patient denies dysuria, frequency or hematuria. He denies constitutional symptoms of fatigue, weakness, weight loss or gain, fevers, night sweats.      On admission patient was hemodynamically stable and afebrile. Patient was AOx4 with dysarthria, no aphasia, R arm weakness with positive drift, also R sided facial droop. NIH 4. WBC 12. EKG did not show any acute ischemic changes. Trop neg. CXR did not show any acute cardiopulmonary abnormality. CT head did not show any acute intracranial abnormality. CTA head showed critical stenosis at the origin of the left internal carotid artery. Thrombus projecting into the lumen at the origin of the left internal carotid artery, at risk for contributing to distal emboli.  stroke team was consulted. Patient not candidate for tPA due to symptoms outside window.  NSGY was consulted recommended heparin drip and MR based non invasive vascular imaging to confirm presence of intraluminal thrombus within the cervical left ICA origin.      Patient was admitted to the ICU for further workup and management       Interval History: No new issues  Continues to be on heparin gtt    No chest pain  No dyspnea      Review of Systems - Negative except as in HPI  All other systems reviewed and are negative. .     Scheduled Medications:    rosuvastatin  40 mg Oral Nightly    clopidogrel  75 mg Oral Daily      PRN Medications: ondansetron **OR** ondansetron, polyethylene glycol, perflutren lipid microspheres  Diet: ADULT DIET;  Regular; 4 carb choices (60 gm/meal)    Continuous Infusions:   heparin (PORCINE) Infusion 16 Units/kg/hr (06/25/22 5984)       PHYSICAL EXAM:  /65   Pulse 55   Temp 98.1 °F (36.7 °C) (Oral)   Resp 16   Ht 5' 9\" (1.753 m)   Wt 138 lb 3.7 oz (62.7 kg)   SpO2 99%   BMI 20.41 kg/m²   Recent Labs     06/24/22 2108   POCGLU 107*       Intake/Output Summary (Last 24 hours) at 6/26/2022 1111  Last data filed at 6/26/2022 1000  Gross per 24 hour   Intake 859.42 ml   Output --   Net 859.42 ml       General appearance: alert, appears stated age and cooperative  Head: Normocephalic, without obvious abnormality, atraumatic  Neck: no adenopathy, no carotid bruit, no JVD, supple, symmetrical, trachea midline and thyroid not enlarged, symmetric, no tenderness/mass/nodules  Lungs: clear to auscultation bilaterally  Chest wall: no tenderness  Heart: regular rate and rhythm, S1, S2 normal, no murmur, click, rub or gallop  Abdomen: soft, non-tender; bowel sounds normal; no masses,  no organomegaly  Extremities: extremities normal, atraumatic, no cyanosis or edema  Pulses: 2+ and symmetric  Neurologic: Grossly normal    LABS:  Recent Labs     06/24/22 2123 06/26/22 0521   WBC 12.4* 11.7*   HGB 12.4* 12.7*   HCT 37.6* 38.9*    202                                                                    Recent Labs     06/24/22 2123 06/26/22 0521    136   K 3.7 4.3    104   CO2 26 20*   BUN 11 14   CREATININE 0.9 0.8   GLUCOSE 108* 77     Recent Labs     06/24/22 2123   AST 20   ALT 12   BILITOT 0.5   ALKPHOS 101     Recent Labs     06/24/22 2123 TROPONINI <0.01     No results for input(s): BNP in the last 72 hours. No results for input(s): CHOL, HDL in the last 72 hours. Invalid input(s): LDLCALCU  Recent Labs     06/24/22 2123   INR 1.12       Assessment & Plan:    75 y.o. male with HLD, Incomplete RBB with GERD admitted with Right facial weakness with right upper extremity weakness and numbness      Acute CVA left MCA territory: POA  Critical cervical R ICA stenosis with probable  intraluminal thrombus: POA  - Appears thromboembolic CVA from LIZ stenosis, withmultiple risk factors for atherosclerotic CVD: tobacco abuse, HTN, \"Prediabetes\" and hyperlipidemia  - Pxt started on heparin gtt  - Keep HOB >30 degrees  - High intensity Statin  - Has allergy to ASA so placed on Plavix   - Echo w/ bubble :   Normal left ventricle size, wall thickness, and systolic function with an   estimated ejection fraction of 55-60%. No regional wall motion abnormalities   are seen. Diastolic filling parameters suggests normal diastolic function. IVC size is normal (<2.1cm) and collapses > 50% with respiration consistent   with normal RA pressure (3mmHg). Estimated pulmonary artery systolic   pressure is at 29 mmHg assuming a right atrial pressure of 3 mmHg. A bubble   study was performed and there are late bubbles suggestive of pulmonary AVMs   (no PFO). - Lipid, Hb A1c pending  - Neurochecks  - Neurology consulted   - Ayan Mims following  - Counseling re tobacco abuse  - Keep on telemetry            GERD (gastroesophageal reflux disease)     Primary hypertension: Monitor BPs     Incomplete RBBB. Echo, telemetry     Alcohol abuse: Monitor for withdrawal.      Prediabetes: Update A1C     History of drug abuse in remission      Chronic hepatitis C virus genotype 1 infection: O/p F/u            The patient and / or the family were informed of the results of any tests, a time was given to answer questions, a plan was proposed and they agreed with plan.     Full Code    Disposition: Remains in ICU        Emi Hui MD

## 2022-06-26 NOTE — PROGRESS NOTES
Progress Note    Admit Date: 6/25/2022  Day: 1  Diet: ADULT DIET; Regular; 4 carb choices (60 gm/meal)    CC: Dysarthria, right hand/arm numbness and weakness    Interval history: NAEON. Patient continues to not have symptoms. Very sleepy from getting woken up a lot. Heparin not therapeutic as of yet. Denies any other complaints. HPI: Shahab Garcia is a 75 y.o. male with PMH as below notable for HTN, alcohol abuse, tobacco use who presented with dysarthria. Time of onset was reported at 12:20 PM. Patient report he was working when he started having slurred speech, patient reports he started also feeling numbness in his R arm. Patient decided to consult. Patient reports his symptoms lasted for approximately 3 hours. Patient denies current alcohol use. He states he smokes 50 packs/year history, currently smokes 1 pack. Lives at home with his family.      The patient denies abdominal or flank pain, anorexia, nausea or vomiting, dysphagia, change in bowel habits or black or bloody stools or weight loss. Patient denies any exertional chest pain, dyspnea, palpitations, syncope, orthopnea, edema or paroxysmal nocturnal dyspnea. The patient denies dysuria, frequency or hematuria. He denies constitutional symptoms of fatigue, weakness, weight loss or gain, fevers, night sweats.      On admission patient was hemodynamically stable and afebrile. Patient was AOx4 with dysarthria, no aphasia, R arm weakness with positive drift, also R sided facial droop. NIH 4. WBC 12. EKG did not show any acute ischemic changes. Trop neg. CXR did not show any acute cardiopulmonary abnormality. CT head did not show any acute intracranial abnormality. CTA head showed critical stenosis at the origin of the left internal carotid artery. Thrombus projecting into the lumen at the origin of the left internal carotid artery, at risk for contributing to distal emboli.  stroke team was consulted.  Patient not candidate for tPA due to symptoms Head: Normocephalic and atraumatic. Cardiovascular:      Rate and Rhythm: Normal rate and regular rhythm. Heart sounds: Normal heart sounds. No murmur heard. Pulmonary:      Effort: Pulmonary effort is normal. No respiratory distress. Breath sounds: Normal breath sounds. No wheezing. Abdominal:      General: Bowel sounds are normal. There is no distension. Palpations: Abdomen is soft. Tenderness: There is no abdominal tenderness. Skin:     General: Skin is warm and dry. Capillary Refill: Capillary refill takes less than 2 seconds. Findings: No erythema. Neurological:      Mental Status: He is alert and oriented to person, place, and time. Psychiatric:         Behavior: Behavior normal.         LABS:    CBC:   Recent Labs     06/24/22 2123 06/26/22  0521   WBC 12.4* 11.7*   HGB 12.4* 12.7*   HCT 37.6* 38.9*    202   MCV 81.6 82.6     Renal:    Recent Labs     06/24/22 2123 06/26/22  0521    136   K 3.7 4.3    104   CO2 26 20*   BUN 11 14   CREATININE 0.9 0.8   GLUCOSE 108* 77   CALCIUM 9.4 9.1   MG  --  2.00   PHOS  --  3.3   ANIONGAP 11 12     Hepatic:   Recent Labs     06/24/22 2123 06/26/22  0521   AST 20  --    ALT 12  --    BILITOT 0.5  --    PROT 7.7  --    LABALBU 4.4 3.8   ALKPHOS 101  --      Troponin:   Recent Labs     06/24/22 2123   TROPONINI <0.01     BNP: No results for input(s): BNP in the last 72 hours. Lipids: No results for input(s): CHOL, HDL in the last 72 hours. Invalid input(s): LDLCALCU, TRIGLYCERIDE  ABGs:  No results for input(s): PHART, FVO2QGP, PO2ART, UXW2UFV, BEART, THGBART, U9SSHNDB, GYQ9VNU in the last 72 hours. INR:   Recent Labs     06/24/22 2123   INR 1.12     Lactate: No results for input(s): LACTATE in the last 72 hours. Cultures:  -----------------------------------------------------------------  RAD:   MRA NECK W WO CONTRAST   Final Result      1.  Critical stenosis (greater than 90% NASCET criteria) of the left internal carotid artery origin related to densely calcified plaque when correlating with CTA appearance with additional contiguous noncalcified intraluminal filling defect suspicious for    thrombus in the proximal bulbar segment of the left internal carotid artery   2. No right carotid artery stenosis   3. No vertebral artery stenosis      MRA HEAD WO CONTRAST   Final Result      No intracranial large vessel occlusion or significant stenosis         MRI BRAIN WO CONTRAST   Final Result   1. Multiple foci of restricted diffusion, acute infarct in left middle cerebral artery territory in the left frontal, left parietal and temporal parietal lobe cortex. 2. Underlying moderate to severe chronic small vessel disease, periventricular microangiopathic leukoencephalopathy. Assessment/Plan:   Left ICA Stenosis, thrombus in bulbar segment of left ICA, Acute stroke (L MCA, frontal, temporal, parital  Presented with dysarthria, no aphasia, R arm weakness with positive drift, also R sided facial droop. CT head did not show any acute intracranial abnormality. CTA head showed critical stenosis at the origin of the left internal carotid artery. Thrombus projecting into the lumen at the origin of the left internal carotid artery, at risk for contributing to distal emboli. - q1h neurochecks  - MRA head normal  - MRA neck showing \"critical stenosis of L ICA, and filling defect suspicious for thrombus in the proximal bulbar segment of the L ICA\"  - MRI brain \"Multiple foci of restricted diffusion, acute infarct in left middle cerebral artery territory in the left frontal, left parietal and temporal parietal lobe cortex. \"  - Will discuss with NSGY about intervention and heparin gtt  - Permissive HTN BP goal <210/120   - Keep HOB >30 degrees  - Statin  - Has allergy to ASA (hives), Will continue on plavix  - Echo w/ bubble overall normal, no PFO  - Neurology consulted   - repeat CT and contact NSGY for any changes in neuro exam  - On low dose heparin gtt (no bolus) for thrombus. Will continue for 3-5 days once reaches therapeutic level and repeat MRI scan. At that time will decide about intervention on ICA stenosis per neurovascular.     Leukocytosis likely reactive  -Monitor VS  -F/u CBC     Tobacco use  -Avoid nicotine patch     Code Status: Full Code  FEN: ADULT DIET; Regular; 4 carb choices (60 gm/meal)  PPX: SCDs, protonix  DISPO: ICU, pending neuology clearance    Jose Cavanuagh MD, PGY-3  06/26/22  8:40 AM    This patient has been staffed and discussed with Dr. Frank Hull. Patient seen, examined and discussed with the resident and I agree with the assessment and plan as edited above.       Raynold Lesches, MD

## 2022-06-26 NOTE — PROGRESS NOTES
Occupational Therapy  Facility/Department: [facility] ICU  Occupational Therapy Initial Assessment/Tx/Discharge Note    Name: Amaya Zambrano  : 1955  MRN: [id number]  Date of Service: 2022     Assessment: Pt's deficits have resolved and he is at his functional baseline. He demonstrates safe mobility and no difficulty with ADLs. Pt will return home with wife at d/c. No acute OT needs identified. Discharge Recommendations: Amaya Zambrano scored a / on the AM-PAC ADL Inpatient form. At this time, no further OT is recommended upon discharge. Recommend patient returns to prior setting with prior services. OT Equipment Recommendations  Equipment Needed: No       Patient Diagnosis(es): There were no encounter diagnoses. Past Medical History:  has a past medical history of Arthritis, GERD (gastroesophageal reflux disease), Hyperlipidemia, Hyperlipidemia, Incomplete RBBB, Ruptured disk, Stroke, and Varicose veins of both lower extremities. Past Surgical History:  has a past surgical history that includes Upper gastrointestinal endoscopy (N/A, 2020); Upper gastrointestinal endoscopy (N/A, 2020); Colonoscopy (2020); Hand surgery (); SAPHENOUS VEIN ABLATION (Bilateral, 2022); and Vein Surgery (Bilateral, 2022). Assessment   Decision Making: Low Complexity  No Skilled OT: No OT goals identified; Safe to return home  REQUIRES OT FOLLOW-UP: No  Activity Tolerance  Activity Tolerance: Patient Tolerated treatment well        Plan  D/C OT services        Restrictions  Position Activity Restriction  Other position/activity restrictions: Up as tolerated    Subjective   General  Chart Reviewed: Yes  Additional Pertinent Hx: Admit  to ICU from [facility] with R sided weakness / Slurred speech  neuro sx -consult- no intervention     CPTA -  Critical stenosis at the origin of the left internal carotid artery. Thrombus projecting into the lumen at the origin of the left internal carotidartery  CT head- neg, MRI brain - Multiple foci of restricted diffusion, acute infarct in left middle cerebral artery territory in the left frontal, left parietal and temporal parietal lobe cortex. PMHX: HTN, smoker  Family / Caregiver Present: No  Referring Practitioner: Sari  Diagnosis: CVA  Subjective  Subjective: Pt in bed on entry. Pleasant and cooperative. Feels back to normal.  General Comment  Comments: No c/o pain     Social/Functional History  Social/Functional History  Lives With: Spouse  Type of Home: Condo (3rd floor)  Home Layout: One level  Home Access: Stairs to enter with rails (3 flights up to 3rd floor condo unit)  Bathroom Shower/Tub: Tub/Shower unit  Bathroom Toilet: Handicap height (sink for leverage)  Bathroom Equipment:  (None)  Home Equipment:  (None)  Has the patient had two or more falls in the past year or any fall with injury in the past year?: No  ADL Assistance: Independent  Ambulation Assistance: Independent  Transfer Assistance: Independent  Active : Yes  Occupation: Full time employment ([employer])  Leisure & Hobbies: watching TV  Additional Comments: Wife works       Objective   Safety Devices  Type of Devices: Left in bed;Call light within reach;Nurse notified (RN ok with no bed alarm in place)  Balance  Sitting: Intact  Standing: Intact (static stance independent, dynamic stance spvn to SBA; two slight, self corrected LOB - one adjusting clothes at bedside, another turning during ambulation)  Transfer Training  Transfer Training: Yes  Overall Level of Assistance: Supervision  Sit to Stand: Independent  Stand to Sit: Independent  Bed to Chair: Supervision  Gait  Overall Level of Assistance: Supervision (in room, bathroom, +stairs with PT)     Strength:  Within functional limits  Coordination: Within functional limits  Sensation: Intact  ADL  Feeding: Independent  Grooming: Independent  LE Dressing: Independent  Toileting: Independent Vision - Basic Assessment  Patient Visual Report: No visual complaint reported.   Cognition  Overall Cognitive Status: WNL  Perception  Overall Perceptual Status: WFL               Education Given To: Patient  Education Provided: Role of Therapy;Plan of Care;Precautions  Education Method: Verbal  Barriers to Learning: None  Education Outcome: Verbalized understanding                        AM-PAC Score  AM-PAC Inpatient Daily Activity Raw Score: 24 (06/26/22 1017)  AM-PAC Inpatient ADL T-Scale Score : 57.54 (06/26/22 1017)  ADL Inpatient CMS 0-100% Score: 0 (06/26/22 1017)  ADL Inpatient CMS G-Code Modifier : 509 [address] (06/26/22 1017)      Therapy Time   Individual Concurrent Group Co-treatment   Time In 0840         Time Out 0908         Minutes 28          Timed Code Tx Min: 13  Total Tx Time: [address], OT

## 2022-06-26 NOTE — PROGRESS NOTES
Questionable anti-XA result at 0521. Anti xa went from <0.10 to >1.10. Repeat anti Xa of 0.11 at 0721. Will titrate heparin off of this result per protocol.

## 2022-06-26 NOTE — PLAN OF CARE
Problem: Discharge Planning  Goal: Discharge to home or other facility with appropriate resources  6/25/2022 2243 by Myah Dyer RN  Outcome: Progressing  Flowsheets (Taken 6/25/2022 2000)  Discharge to home or other facility with appropriate resources: Identify barriers to discharge with patient and caregiver     Problem: Pain  Goal: Verbalizes/displays adequate comfort level or baseline comfort level  6/25/2022 2243 by Myah Dyer RN  Outcome: Progressing  Flowsheets (Taken 6/25/2022 2000)  Verbalizes/displays adequate comfort level or baseline comfort level:   Encourage patient to monitor pain and request assistance   Assess pain using appropriate pain scale     Problem: Safety - Adult  Goal: Free from fall injury  6/25/2022 2243 by Myah Dyer RN  Outcome: Progressing  Flowsheets (Taken 6/25/2022 2200)  Free From Fall Injury: Instruct family/caregiver on patient safety     Problem: Chronic Conditions and Co-morbidities  Goal: Patient's chronic conditions and co-morbidity symptoms are monitored and maintained or improved  6/25/2022 2243 by Myah Dyer RN  Outcome: Progressing  [address] (Taken 6/25/2022 2000)  Care Plan - Patient's Chronic Conditions and Co-Morbidity Symptoms are Monitored and Maintained or Improved: Monitor and assess patient's chronic conditions and comorbid symptoms for stability, deterioration, or improvement     Problem: ABCDS Injury Assessment  Goal: Absence of physical injury  6/25/2022 2243 by Myah Dyer RN  Outcome: Progressing  Flowsheets (Taken 6/25/2022 2200)  Absence of Physical Injury: Implement safety measures based on patient assessment     Problem: Neurosensory - Adult  Goal: Achieves stable or improved neurological status  6/25/2022 2243 by Myah Dyer RN  Outcome: Progressing  Flowsheets (Taken 6/25/2022 2000)  Achieves stable or improved neurological status: Assess for and report changes in neurological status     Problem: Neurosensory - Adult  Goal: Absence of Integrity - Adult  Goal: Incisions, wounds, or drain sites healing without S/S of infection  6/25/2022 2243 by Montserrat Duncan RN  Outcome: Progressing  Flowsheets  Taken 6/25/2022 2200  Incisions, Wounds, or Drain Sites Healing Without Sign and Symptoms of Infection:   ADMISSION and DAILY: Assess and document risk factors for pressure ulcer development   TWICE DAILY: Assess and document skin integrity  Taken 6/25/2022 2000  Incisions, Wounds, or Drain Sites Healing Without Sign and Symptoms of Infection:   ADMISSION and DAILY: Assess and document risk factors for pressure ulcer development   TWICE DAILY: Assess and document skin integrity   TWICE DAILY: Assess and document dressing/incision, wound bed, drain sites and surrounding tissue     Problem: Skin/Tissue Integrity - Adult  Goal: Oral mucous membranes remain intact  6/25/2022 2243 by Montserrat Duncan RN  Outcome: Progressing  Flowsheets  Taken 6/25/2022 2200  Oral Mucous Membranes Remain Intact: Assess oral mucosa and hygiene practices  Taken 6/25/2022 2000  Oral Mucous Membranes Remain Intact: Assess oral mucosa and hygiene practices     Problem: Musculoskeletal - Adult  Goal: Return mobility to safest level of function  6/25/2022 2243 by Montserrat Duncan RN  Outcome: Progressing  Flowsheets (Taken 6/25/2022 2000)  Return Mobility to Safest Level of Function: Assess patient stability and activity tolerance for standing, transferring and ambulating with or without assistive devices     Problem: Musculoskeletal - Adult  Goal: Maintain proper alignment of affected body part  6/25/2022 2243 by Montserrat Duncan RN  Outcome: Progressing  Flowsheets (Taken 6/25/2022 2000)  Maintain proper alignment of affected body part: Support and protect limb and body alignment per provider's orders     Problem: Musculoskeletal - Adult  Goal: Maintain proper alignment of affected body part  6/25/2022 2243 by Montserrat Dunacn RN  Outcome: Progressing  Flowsheets (Taken 6/25/2022 2000)  Maintain proper alignment of affected body part: Support and protect limb and body alignment per provider's orders     Problem: Musculoskeletal - Adult  Goal: Return ADL status to a safe level of function  6/25/2022 2243 by Lydia Garcia RN  Outcome: Progressing  Flowsheets (Taken 6/25/2022 2000)  Return ADL Status to a Safe Level of Function: Administer medication as ordered     Problem: Gastrointestinal - Adult  Goal: Minimal or absence of nausea and vomiting  6/25/2022 2243 by Lydia Garcia RN  Outcome: Progressing  Flowsheets (Taken 6/25/2022 2000)  Minimal or absence of nausea and vomiting: Administer IV fluids as ordered to ensure adequate hydration     Problem: Gastrointestinal - Adult  Goal: Maintains adequate nutritional intake  6/25/2022 2243 by Lydia Garcia RN  Outcome: Progressing  Flowsheets (Taken 6/25/2022 2000)  Maintains adequate nutritional intake: Monitor percentage of each meal consumed     Problem: Gastrointestinal - Adult  Goal: Establish and maintain optimal ostomy function  6/25/2022 2243 by Lydia Garcia RN  Outcome: Progressing  Flowsheets (Taken 6/25/2022 2000)  Establish and maintain optimal ostomy function: Monitor output from ostomies     Problem: Genitourinary - Adult  Goal: Absence of urinary retention  6/25/2022 2243 by Lydia Garcia RN  Outcome: Progressing  Flowsheets (Taken 6/25/2022 2000)  Absence of urinary retention: Assess patients ability to void and empty bladder     Problem: Infection - Adult  Goal: Absence of infection at discharge  6/25/2022 2243 by Lydia Garcia RN  Outcome: Progressing  Flowsheets  Taken 6/25/2022 2200  Absence of infection at discharge: Assess and monitor for signs and symptoms of infection  Taken 6/25/2022 2000  Absence of infection at discharge: Assess and monitor for signs and symptoms of infection     Problem: Infection - Adult  Goal: Absence of infection during hospitalization  6/25/2022 2243 by Lydia Garcia RN  Outcome: Progressing  Flowsheets  Taken 6/25/2022 2200  Absence of infection during hospitalization: Assess and monitor for signs and symptoms of infection  Taken 6/25/2022 2000  Absence of infection during hospitalization: Assess and monitor for signs and symptoms of infection     Problem: Metabolic/Fluid and Electrolytes - Adult  Goal: Hemodynamic stability and optimal renal function maintained  6/25/2022 2243 by Marlon Barboza RN  Outcome: Progressing  Flowsheets (Taken 6/25/2022 2000)  Hemodynamic stability and optimal renal function maintained: Monitor labs and assess for signs and symptoms of volume excess or deficit     Problem: Metabolic/Fluid and Electrolytes - Adult  Goal: Glucose maintained within prescribed range  6/25/2022 2243 by Marlon Barboza RN  Outcome: Progressing  Flowsheets (Taken 6/25/2022 2000)  Glucose maintained within prescribed range: Monitor blood glucose as ordered     Problem: Hematologic - Adult  Goal: Maintains hematologic stability  6/25/2022 2243 by Marlon Barboza RN  Outcome: Progressing  Flowsheets (Taken 6/25/2022 2000)  Maintains hematologic stability: Assess for signs and symptoms of bleeding or hemorrhage

## 2022-06-27 LAB
ALBUMIN SERPL-MCNC: 4 G/DL (ref 3.4–5)
ANION GAP SERPL CALCULATED.3IONS-SCNC: 11 MMOL/L (ref 3–16)
ANTI-XA UNFRAC HEPARIN: 0.23 IU/ML (ref 0.3–0.7)
ANTI-XA UNFRAC HEPARIN: 0.28 IU/ML (ref 0.3–0.7)
ANTI-XA UNFRAC HEPARIN: 0.55 IU/ML (ref 0.3–0.7)
BASOPHILS ABSOLUTE: 0.2 K/UL (ref 0–0.2)
BASOPHILS RELATIVE PERCENT: 1.9 %
BUN BLDV-MCNC: 13 MG/DL (ref 7–20)
CALCIUM SERPL-MCNC: 9.2 MG/DL (ref 8.3–10.6)
CHLORIDE BLD-SCNC: 102 MMOL/L (ref 99–110)
CO2: 24 MMOL/L (ref 21–32)
CREAT SERPL-MCNC: 0.8 MG/DL (ref 0.8–1.3)
EOSINOPHILS ABSOLUTE: 0.3 K/UL (ref 0–0.6)
EOSINOPHILS RELATIVE PERCENT: 2.4 %
GFR AFRICAN AMERICAN: >60
GFR NON-AFRICAN AMERICAN: >60
GLUCOSE BLD-MCNC: 77 MG/DL (ref 70–99)
HCT VFR BLD CALC: 38.5 % (ref 40.5–52.5)
HEMOGLOBIN: 12.7 G/DL (ref 13.5–17.5)
LYMPHOCYTES ABSOLUTE: 2.9 K/UL (ref 1–5.1)
LYMPHOCYTES RELATIVE PERCENT: 26.2 %
MAGNESIUM: 1.9 MG/DL (ref 1.8–2.4)
MCH RBC QN AUTO: 27.2 PG (ref 26–34)
MCHC RBC AUTO-ENTMCNC: 33.1 G/DL (ref 31–36)
MCV RBC AUTO: 82.3 FL (ref 80–100)
MONOCYTES ABSOLUTE: 0.6 K/UL (ref 0–1.3)
MONOCYTES RELATIVE PERCENT: 5.9 %
NEUTROPHILS ABSOLUTE: 7 K/UL (ref 1.7–7.7)
NEUTROPHILS RELATIVE PERCENT: 63.6 %
PDW BLD-RTO: 14.4 % (ref 12.4–15.4)
PHOSPHORUS: 3.6 MG/DL (ref 2.5–4.9)
PLATELET # BLD: 308 K/UL (ref 135–450)
PMV BLD AUTO: 8.7 FL (ref 5–10.5)
POTASSIUM SERPL-SCNC: 3.9 MMOL/L (ref 3.5–5.1)
RBC # BLD: 4.68 M/UL (ref 4.2–5.9)
SODIUM BLD-SCNC: 137 MMOL/L (ref 136–145)
WBC # BLD: 11.1 K/UL (ref 4–11)

## 2022-06-27 PROCEDURE — 6370000000 HC RX 637 (ALT 250 FOR IP)

## 2022-06-27 PROCEDURE — 36415 COLL VENOUS BLD VENIPUNCTURE: CPT

## 2022-06-27 PROCEDURE — 99291 CRITICAL CARE FIRST HOUR: CPT | Performed by: NURSE PRACTITIONER

## 2022-06-27 PROCEDURE — 99233 SBSQ HOSP IP/OBS HIGH 50: CPT | Performed by: INTERNAL MEDICINE

## 2022-06-27 PROCEDURE — 80069 RENAL FUNCTION PANEL: CPT

## 2022-06-27 PROCEDURE — 83735 ASSAY OF MAGNESIUM: CPT

## 2022-06-27 PROCEDURE — 85520 HEPARIN ASSAY: CPT

## 2022-06-27 PROCEDURE — 6370000000 HC RX 637 (ALT 250 FOR IP): Performed by: STUDENT IN AN ORGANIZED HEALTH CARE EDUCATION/TRAINING PROGRAM

## 2022-06-27 PROCEDURE — 85025 COMPLETE CBC W/AUTO DIFF WBC: CPT

## 2022-06-27 PROCEDURE — 2000000000 HC ICU R&B

## 2022-06-27 RX ADMIN — PANTOPRAZOLE SODIUM 40 MG: 40 TABLET, DELAYED RELEASE ORAL at 07:51

## 2022-06-27 RX ADMIN — ROSUVASTATIN CALCIUM 40 MG: 20 TABLET, COATED ORAL at 20:05

## 2022-06-27 ASSESSMENT — PAIN SCALES - GENERAL
PAINLEVEL_OUTOF10: 0

## 2022-06-27 ASSESSMENT — ENCOUNTER SYMPTOMS: FACIAL SWELLING: 1

## 2022-06-27 NOTE — PROGRESS NOTES
NEUROSURGERY PROGRESS NOTE    6/27/2022 10:38 AM                               Amaya Zambrano                      LOS: 2 days     Subjective: Patient sitting up in bed upon entering the room. No acute events overnight. Patient has no specific complaints this morning. Physical Exam:  Patient seen and examined    Vitals:    06/27/22 1000   BP: 126/66   Pulse: (!) 49   Resp: 21   Temp: 98 °F (36.7 °C)   SpO2: 100%     GCS:  4 - Opens eyes on own  5 - Alert and oriented  6 - Follows simple motor commands  General: Well developed. Alert and cooperative in no acute distress. HENT: atraumatic, neck supple  Eyes: Optic discs: Not tested  Pulmonary: unlabored respiratory effort  Cardiovascular:  Warm well perfused. No peripheral edema  Gastrointestinal: abdomen soft, NT, ND    Neurological:  Mental Status: Awake, alert, oriented x 4, speech clear and appropriate  Attention: Intact  Language: No aphasia or dysarthria noted  Sensation: Intact to all extremities to light touch  Coordination: Intact    Cranial Nerves:  II: Visual acuity not tested, denies new visual changes / diplopia  III, IV, VI: PERRL, 3 mm bilaterally, EOMI, no nystagmus noted  V: Facial sensation intact bilaterally to touch  VII: Face symmetric  VIII: Hearing intact bilaterally to spoken voice  IX: Palate movement equal bilaterally  XI: Shoulder shrug equal bilaterally  XII: Tongue midline    Musculoskeletal:   Gait: Not tested   Assist devices: None   Tone: Normal  Motor strength:    Right  Left    Right  Left    Deltoid  5 5  Hip Flex  5 5   Biceps  5 5  Knee Extensors  5 5   Triceps  5 5  Knee Flexors  5 5   Wrist Ext  5 5  Ankle Dorsiflex. 5 5   Wrist Flex  5 5  Ankle Plantarflex. 5 5   Handgrip  5 5  Ext Ladarius Longus  5 5   Thumb Ext  5 5         Radiological Findings:  CT HEAD WO CONTRAST  Result Date: 6/24/2022  No acute intracranial abnormality. Minimal parenchymal volume loss. Mild chronic microvascular disease.      CTA HEAD NECK W CONTRAST  Result Date: 6/24/2022  Critical stenosis at the origin of the left internal carotid artery. Thrombus projecting into the lumen at the origin of the left internal carotid artery, at risk for contributing to distal emboli. Otherwise, no acute abnormality or flow-limiting stenosis in the remainder of the major arteries of the head and neck. MRI BRAIN WO CONTRAST  Result Date: 6/25/2022  1. Multiple foci of restricted diffusion, acute infarct in left middle cerebral artery territory in the left frontal, left parietal and temporal parietal lobe cortex. 2. Underlying moderate to severe chronic small vessel disease, periventricular microangiopathic leukoencephalopathy. MRA HEAD WO CONTRAST  Result Date: 6/25/2022  No intracranial large vessel occlusion or significant stenosis     MRA NECK W WO CONTRAST  Result Date: 6/25/2022  1. Critical stenosis (greater than 90% NASCET criteria) of the left internal carotid artery origin related to densely calcified plaque when correlating with CTA appearance with additional contiguous noncalcified intraluminal filling defect suspicious for thrombus in the proximal bulbar segment of the left internal carotid artery  2. No right carotid artery stenosis  3.  No vertebral artery stenosis    Labs:  Recent Labs     06/27/22  0537   WBC 11.1*   HGB 12.7*   HCT 38.5*          Recent Labs     06/27/22  0537      K 3.9      CO2 24   BUN 13   CREATININE 0.8   GLUCOSE 77   CALCIUM 9.2   PHOS 3.6   MG 1.90       Recent Labs     06/24/22 2123 06/25/22  1250   PROTIME 14.4  --    INR 1.12  --    APTT  --  34.5*       Patient Active Problem List    Diagnosis Date Noted    Ischemic stroke ([address] 06/25/2022    Acute CVA (cerebrovascular accident) ([address] 06/24/2022    Primary hypertension 04/26/2022    Smoker 04/26/2022    Symptomatic varicose veins of right lower extremity     Symptomatic varicose veins of both lower extremities     Postoperative pain of extremity     Symptomatic varicose veins of left lower extremity     Acrophobia 06/18/2021    Chronic hepatitis C virus genotype 1 infection ([facility] 10/15/2020    History of drug abuse in remission ([facility] 10/09/2020    Prediabetes 09/18/2020    Alcohol abuse 09/17/2020    Osteoarthritis of lumbar spine 11/22/2012    Incomplete RBBB 06/27/2012    GERD (gastroesophageal reflux disease) 05/27/2012       Assessment:  Patient is a 75 y.o. male w/ signs and symptoms consistent with an acute left hemispheric ischemic incident. MRA showed LICA stenosis with thrombus so low dose no bolus Heparin gtt started. Plan:  1. Neurologic exams frequency: Defer to Neurology recs  2. For change in exam MUST contact neurosurgery team along with critical care or primary team  3. Thrombus in the proximal bulbar segment of the left internal carotid artery:  - Low-dose, no-bolus systemic heparinization protocol  - Repeat MR-based noninvasive vascular imaging studies will then be repeated in 3-5 days from the point at which a therapeutic hptt or anti-Xa level is first reached with further plan of care to be predicated upon the imaging findings. - Neurology following for stroke w/u  - Further neurointerventional surgery evaluation and management moving forward will only be necessary should thromboembolic incident and resultant ipsilateral large vessel occlusion occur. 4. Will follow inpatient. Please call with any questions or decline in neurological status    DISPO: Remain inpatient from neurosurgery standpoint. Dispo timing to be determined by primary team once patient is medically stable for discharge. Patient was discussed with Dr. Zelda Fuentes who agrees with above assessment and plan.      Electronically signed by: YASMANY Agee CNP, APRN-CNP, 6/27/2022 10:38 AM  [phone]

## 2022-06-27 NOTE — PLAN OF CARE
Problem: Discharge Planning  Goal: Discharge to home or other facility with appropriate resources  Outcome: Progressing  Flowsheets (Taken 6/27/2022 0751)  Discharge to home or other facility with appropriate resources:   Identify barriers to discharge with patient and caregiver   Arrange for needed discharge resources and transportation as appropriate   Identify discharge learning needs (meds, wound care, etc)     Problem: Pain  Goal: Verbalizes/displays adequate comfort level or baseline comfort level  6/27/2022 1019 by Sari Banks RN  Outcome: Progressing  Flowsheets  Taken 6/27/2022 1000  Verbalizes/displays adequate comfort level or baseline comfort level:   Encourage patient to monitor pain and request assistance   Assess pain using appropriate pain scale   Administer analgesics based on type and severity of pain and evaluate response   Implement non-pharmacological measures as appropriate and evaluate response   Consider cultural and social influences on pain and pain management   Notify Licensed Independent Practitioner if interventions unsuccessful or patient reports new pain  Taken 6/27/2022 0900  Verbalizes/displays adequate comfort level or baseline comfort level:   Encourage patient to monitor pain and request assistance   Assess pain using appropriate pain scale   Administer analgesics based on type and severity of pain and evaluate response   Implement non-pharmacological measures as appropriate and evaluate response   Consider cultural and social influences on pain and pain management   Notify Licensed Independent Practitioner if interventions unsuccessful or patient reports new pain  6/27/2022 0106 by Cathy Coreas RN  Outcome: Progressing  Flowsheets (Taken 6/26/2022 2000)  Verbalizes/displays adequate comfort level or baseline comfort level:   Encourage patient to monitor pain and request assistance   Assess pain using appropriate pain scale   Administer analgesics based on type and severity of pain and evaluate response     Problem: Safety - Adult  Goal: Free from fall injury  6/27/2022 1019 by Delilah Dao RN  Outcome: Progressing  6/27/2022 0106 by Elvia Zimmer RN  Outcome: Progressing  Flowsheets (Taken 6/27/2022 0053)  Free From Fall Injury: Instruct family/caregiver on patient safety     Problem: Chronic Conditions and Co-morbidities  Goal: Patient's chronic conditions and co-morbidity symptoms are monitored and maintained or improved  6/27/2022 1019 by Delilah Dao RN  Outcome: Progressing  Flowsheets (Taken 6/27/2022 0751)  Care Plan - Patient's Chronic Conditions and Co-Morbidity Symptoms are Monitored and Maintained or Improved:   Monitor and assess patient's chronic conditions and comorbid symptoms for stability, deterioration, or improvement   Collaborate with multidisciplinary team to address chronic and comorbid conditions and prevent exacerbation or deterioration   Update acute care plan with appropriate goals if chronic or comorbid symptoms are exacerbated and prevent overall improvement and discharge  6/27/2022 0106 by Elvia Zimmer RN  Outcome: Progressing     Problem: ABCDS Injury Assessment  Goal: Absence of physical injury  6/27/2022 1019 by Delilah Dao RN  Outcome: Progressing  6/27/2022 0106 by Elvia Zimmer RN  Outcome: Progressing  Flowsheets (Taken 6/27/2022 0053)  Absence of Physical Injury: Implement safety measures based on patient assessment     Problem: Neurosensory - Adult  Goal: Achieves stable or improved neurological status  6/27/2022 1019 by Delilah Dao RN  Outcome: Progressing  Flowsheets (Taken 6/27/2022 0751)  Achieves stable or improved neurological status:   Assess for and report changes in neurological status   Initiate measures to prevent increased intracranial pressure   Maintain blood pressure and fluid volume within ordered parameters to optimize cerebral perfusion and minimize risk of hemorrhage   Monitor temperature, glucose, and sodium. Initiate appropriate interventions as ordered  6/27/2022 0106 by Olivia Cintron RN  Outcome: Progressing  Flowsheets (Taken 6/26/2022 1900)  Achieves stable or improved neurological status:   Assess for and report changes in neurological status   Initiate measures to prevent increased intracranial pressure   Maintain blood pressure and fluid volume within ordered parameters to optimize cerebral perfusion and minimize risk of hemorrhage   Monitor temperature, glucose, and sodium. Initiate appropriate interventions as ordered  Goal: Absence of seizures  6/27/2022 1019 by Zakiya Elam RN  Outcome: Progressing  Flowsheets (Taken 6/27/2022 0751)  Absence of seizures:   Monitor for seizure activity.   If seizure occurs, document type and location of movements and any associated apnea   If seizure occurs, turn head to side and suction secretions as needed   Administer anticonvulsants as ordered   Support airway/breathing, administer oxygen as needed  6/27/2022 0106 by Olivia Cintron RN  Outcome: Progressing  Goal: Remains free of injury related to seizures activity  Outcome: Progressing  Flowsheets (Taken 6/27/2022 0751)  Remains free of injury related to seizure activity: Maintain airway, patient safety  and administer oxygen as ordered  Goal: Achieves maximal functionality and self care  Outcome: Progressing  Flowsheets (Taken 6/27/2022 0751)  Achieves maximal functionality and self care:   Monitor swallowing and airway patency with patient fatigue and changes in neurological status   Encourage and assist patient to increase activity and self care with guidance from physical therapy/occupational therapy     Problem: Respiratory - Adult  Goal: Achieves optimal ventilation and oxygenation  Outcome: Progressing  Flowsheets (Taken 6/27/2022 0751)  Achieves optimal ventilation and oxygenation:   Assess for changes in respiratory status   Assess for changes in mentation and behavior   Position to facilitate oxygenation and minimize respiratory effort     Problem: Cardiovascular - Adult  Goal: Maintains optimal cardiac output and hemodynamic stability  Outcome: Progressing  Flowsheets (Taken 6/27/2022 0751)  Maintains optimal cardiac output and hemodynamic stability:   Monitor blood pressure and heart rate   Monitor urine output and notify Licensed Independent Practitioner for values outside of normal range   Assess for signs of decreased cardiac output  Goal: Absence of cardiac dysrhythmias or at baseline  Outcome: Progressing  Flowsheets (Taken 6/27/2022 0751)  Absence of cardiac dysrhythmias or at baseline:   Monitor cardiac rate and rhythm   Assess for signs of decreased cardiac output     Problem: Skin/Tissue Integrity - Adult  Goal: Skin integrity remains intact  6/27/2022 1019 by Daniel Vazquez RN  Outcome: Progressing  Flowsheets (Taken 6/27/2022 0751)  Skin Integrity Remains Intact:   Monitor for areas of redness and/or skin breakdown   Assess vascular access sites hourly  6/27/2022 0106 by Keturah Peterson RN  Outcome: Progressing  Flowsheets  Taken 6/27/2022 0053  Skin Integrity Remains Intact:   Monitor for areas of redness and/or skin breakdown   Every 4-6 hours minimum: Change oxygen saturation probe site  Taken 6/26/2022 2000  Skin Integrity Remains Intact:   Monitor for areas of redness and/or skin breakdown   Every 4-6 hours minimum: Change oxygen saturation probe site  Goal: Incisions, wounds, or drain sites healing without S/S of infection  Outcome: Progressing  Flowsheets (Taken 6/27/2022 0751)  Incisions, Wounds, or Drain Sites Healing Without Sign and Symptoms of Infection:   ADMISSION and DAILY: Assess and document risk factors for pressure ulcer development   TWICE DAILY: Assess and document skin integrity   TWICE DAILY: Assess and document dressing/incision, wound bed, drain sites and surrounding tissue   Implement wound care per orders  Goal: Oral mucous membranes remain intact  Outcome: Progressing  Flowsheets (Taken 6/27/2022 0751)  Oral Mucous Membranes Remain Intact:   Assess oral mucosa and hygiene practices   Implement preventative oral hygiene regimen   Implement oral medicated treatments as ordered     Problem: Musculoskeletal - Adult  Goal: Return mobility to safest level of function  Outcome: Progressing  Flowsheets (Taken 6/27/2022 0751)  Return Mobility to Safest Level of Function:   Assess patient stability and activity tolerance for standing, transferring and ambulating with or without assistive devices   Assist with transfers and ambulation using safe patient handling equipment as needed   Ensure adequate protection for wounds/incisions during mobilization   Obtain physical therapy/occupational therapy consults as needed   Apply continuous passive motion per provider or physical therapy orders to increase flexion toward goal   Instruct patient/family in ordered activity level  Goal: Maintain proper alignment of affected body part  Outcome: Progressing  Flowsheets (Taken 6/27/2022 0751)  Maintain proper alignment of affected body part:   Support and protect limb and body alignment per provider's orders   Instruct and reinforce with patient and family use of appropriate assistive device and precautions (e.g. spinal or hip dislocation precautions)  Goal: Return ADL status to a safe level of function  Outcome: Progressing  Flowsheets (Taken 6/27/2022 0751)  Return ADL Status to a Safe Level of Function:   Administer medication as ordered   Assess activities of daily living deficits and provide assistive devices as needed   Obtain physical therapy/occupational therapy consults as needed   Assist and instruct patient to increase activity and self care as tolerated     Problem: Gastrointestinal - Adult  Goal: Minimal or absence of nausea and vomiting  Outcome: Progressing  Flowsheets (Taken 6/27/2022 0751)  Minimal or absence of nausea and vomiting: Administer IV fluids as ordered to ensure adequate hydration  Goal: Maintains or returns to baseline bowel function  Outcome: Progressing  Flowsheets (Taken 6/27/2022 0751)  Maintains or returns to baseline bowel function:   Assess bowel function   Encourage oral fluids to ensure adequate hydration   Administer IV fluids as ordered to ensure adequate hydration   Administer ordered medications as needed   Encourage mobilization and activity  Goal: Maintains adequate nutritional intake  Outcome: Progressing  Flowsheets (Taken 6/27/2022 0751)  Maintains adequate nutritional intake:   Monitor percentage of each meal consumed   Identify factors contributing to decreased intake, treat as appropriate   Assist with meals as needed   Monitor intake and output, weight and lab values  Goal: Establish and maintain optimal ostomy function  Outcome: Progressing  Flowsheets (Taken 6/27/2022 0751)  Establish and maintain optimal ostomy function:   Monitor output from ostomies   Administer IV fluids and TPN as ordered   Introduce and advance enteral feedings as ordered     Problem: Genitourinary - Adult  Goal: Absence of urinary retention  Outcome: Progressing  Flowsheets (Taken 6/27/2022 0751)  Absence of urinary retention:   Assess patients ability to void and empty bladder   Monitor intake/output and perform bladder scan as needed     Problem: Infection - Adult  Goal: Absence of infection at discharge  Outcome: Progressing  Flowsheets (Taken 6/27/2022 0751)  Absence of infection at discharge:   Assess and monitor for signs and symptoms of infection   Monitor lab/diagnostic results   Monitor all insertion sites i.e., indwelling lines, tubes and drains   Administer medications as ordered   Instruct and encourage patient and family to use good hand hygiene technique  Goal: Absence of infection during hospitalization  Outcome: Progressing  Flowsheets (Taken 6/27/2022 0751)  Absence of infection during hospitalization:   Assess and monitor for signs and symptoms of infection   Monitor all insertion sites i.e., indwelling lines, tubes and drains   Monitor lab/diagnostic results   [place] appropriate cooling/warming therapies per order   Administer medications as ordered   Instruct and encourage patient and family to use good hand hygiene technique   Identify and instruct in appropriate isolation precautions for identified infection/condition     Problem: Metabolic/Fluid and Electrolytes - Adult  Goal: Electrolytes maintained within normal limits  Outcome: Progressing  Flowsheets (Taken 6/27/2022 0751)  Electrolytes maintained within normal limits:   Monitor labs and assess patient for signs and symptoms of electrolyte imbalances   Administer electrolyte replacement as ordered   Monitor response to electrolyte replacements, including repeat lab results as appropriate  Goal: Hemodynamic stability and optimal renal function maintained  Outcome: Progressing  Flowsheets (Taken 6/27/2022 0751)  Hemodynamic stability and optimal renal function maintained:   Monitor labs and assess for signs and symptoms of volume excess or deficit   Monitor intake, output and patient weight   Monitor urine specific gravity, serum osmolarity and serum sodium as indicated or ordered   Monitor response to interventions for patient's volume status, including labs, urine output, blood pressure (other measures as available)   Encourage oral intake as appropriate   Instruct patient on fluid and nutrition restrictions as appropriate  Goal: Glucose maintained within prescribed range  Outcome: Progressing  Flowsheets (Taken 6/27/2022 0751)  Glucose maintained within prescribed range:   Monitor blood glucose as ordered   Assess for signs and symptoms of hyperglycemia and hypoglycemia   Administer ordered medications to maintain glucose within target range   Assess barriers to adequate nutritional intake and initiate nutrition consult as needed   Instruct patient on self management of diabetes and initiate consult as needed     Problem: Hematologic - Adult  Goal: Maintains hematologic stability  Outcome: Progressing  Flowsheets (Taken 6/27/2022 0751)  Maintains hematologic stability:   Assess for signs and symptoms of bleeding or hemorrhage   Monitor labs for bleeding or clotting disorders   Administer blood products/factors as ordered

## 2022-06-27 NOTE — PROGRESS NOTES
NEUROLOGY / NEUROCRITICAL CARE PROGRESS NOTE       Patient Name: Jasvir Pretty YOB: 1955   Sex: Male Age: 75 yrs     CC / Reason for Consult: ischemic stroke vs TIA in the context of L ICA origin stenosis with concern of intraluminal thrombus formation    Interval Hx / Changes over last 24 hours: On heparin. Anti xa 0.55  Scored 24/24 on AM-PAC  Denies any new neurologic deficits. No complaints overnight. ROS: No headache, no focal weakness, no speech difficulties    HISTORY   Admission HPI:   Jasvir Pretty is a 75 y.o. y/o male with PMH significant for arthritis, GERD, HLD, incomplete RBB.      Reported time of onset at 12:20 with slurred speech. Per my interview with the patient, reports that he also had difficulty grasping objects with his R hand and felt like it was more difficult to walk, but does not think that he was necessarily weak. Patient's wife last saw patient normal before leaving for work that morning. He presented to the ER and the stroke team was notified. ER provider noted that the patient was dysarthric, had a RUE drift as well as a R facial droop and some RLE numbness. CT head with no acute abnormality. CTA head and neck reviewed by Neurovascular, demonstrates critical cervical L ICA origin stenosis without associated tandem intracranial occlusion with concern for potential associated intraluminal thrombus formation but there is streak artifact in this location as well. Not a candidate for TPA due to his presentation time. He was transferred to [facility] for further management, planning for MRI brain and MRA head and neck and potential low dose no bolus heparin gtt depending on MRAs.     Patient reports he does not have a history of high blood pressure, reports that last time he was at his PCP his systolic was 650. Reports he does not take a statin at home and that he is allergic to aspirin \"I broke out in hives\". He is a current smoker.  Currently reports that he feels like he has occaisional word finding difficulty, but feels that his speech does not seem slurred to him and his symptoms have significantly improved/otherwise resolved. No facial weakness or pronator drift observed. NIHSS 1 for reported word finding difficulty though he does well with formal testing. He denies associated headache, dizziness, lightheadedness, numbness/tingling. He has never had anything like this happen before. PMH Past Medical History:   Diagnosis Date    Arthritis     GERD (gastroesophageal reflux disease)     Hyperlipidemia     Hyperlipidemia 05/27/2012    Incomplete RBBB     Ruptured disk 2019    Stroke 06/25/2022    Varicose veins of both lower extremities       Allergies Allergies   Allergen Reactions    Aspirin Hives     Patient reports he got hives while taking ASA    Pcn [Penicillins] Hives      Diet ADULT DIET; Regular; 4 carb choices (60 gm/meal)   Isolation No active isolations     CURRENT SCHEDULED MEDICATIONS   Inpatient Medications     pantoprazole, 40 mg, Oral, QAM AC    rosuvastatin, 40 mg, Oral, Nightly   Infusions    heparin (PORCINE) Infusion 16 Units/kg/hr (06/27/22 6539)        LABS   Metabolic Panel Recent Labs     06/24/22 2123 06/26/22 0521 06/27/22  0537    136 137   K 3.7 4.3 3.9    104 102   CO2 26 20* 24   BUN 11 14 13   CREATININE 0.9 0.8 0.8   GLUCOSE 108* 77 77   CALCIUM 9.4 9.1 9.2   LABALBU 4.4 3.8 4.0   PHOS  --  3.3 3.6   MG  --  2.00 1.90   ALKPHOS 101  --   --    ALT 12  --   --    AST 20  --   --       CBC / Coags Recent Labs     06/24/22 2123 06/26/22 0521 06/27/22  0537   WBC 12.4* 11.7* 11.1*   RBC 4.61 4.71 4.68   HGB 12.4* 12.7* 12.7*   HCT 37.6* 38.9* 38.5*    202 308   INR 1.12  --   --       Other Recent Labs     06/26/22 0521   LABA1C 5.6   LDLCALC 79   TRIG 57        DIAGNOSTICS   IMAGES:  Images personally reviewed and agree w/ radiology interpretation.     CT-A H/N: Critical left ICA stenosis at origin; thrombus projecting into lumen. MRI brain w/o giuliana  1. Multiple foci of restricted diffusion, acute infarct in left middle cerebral artery territory in the left frontal, left parietal and temporal parietal lobe cortex.       2. Underlying moderate to severe chronic small vessel disease, periventricular microangiopathic leukoencephalopathy. Echo   Normal left ventricle size, wall thickness, and systolic function with an   estimated ejection fraction of 55-60%. No regional wall motion abnormalities   are seen. Diastolic filling parameters suggests normal diastolic function. IVC size is normal (<2.1cm) and collapses > 50% with respiration consistent   with normal RA pressure (3mmHg). Estimated pulmonary artery systolic   pressure is at 29 mmHg assuming a right atrial pressure of 3 mmHg. A bubble   study was performed and there are late bubbles suggestive of pulmonary AVMs   (no PFO). EKG: NSR    PHYSICAL EXAMINATION     PHYSICAL EXAM:  Vitals:    06/27/22 0600 06/27/22 0700 06/27/22 0751 06/27/22 0900   BP:  (!) 146/73  137/73   Pulse: (!) 44 (!) 47 (!) 49 51   Resp: 15 15  19   Temp:  98.1 °F (36.7 °C)     TempSrc:  Oral     SpO2: 100% 100%  98%   Weight: 138 lb 10.7 oz (62.9 kg)      Height:          Exam   -Mental status: A&O x4; pleasant & appropriate  -Speech & Language: no aphasia; no dysarthria. Follows commands with ease.  Conversational.  -Cranial nerves: pupils symmetric; no notable dysconjugate gaze; eyes midline; no facial asymmetry  -Motor: moving all extremities symmetrically and fully  -Other: no adventitious movements noted  Other Systems  -General Appearance: well-developed, well-nourished, no apparent distress  -Neck: supple  -Lungs: breathing unlabored, regular, no audible wheezes  -CV: pulses strong x4 extremities  -Abd: flat    ASSESSMENT & RECOMMENDATIONS     Mr. Cindi Titus is a 75 y.o. y/o male with hyperlipidemia who presented with slurred speech, right facial weakness, RLE numbness and RUE carlos coronado who was found to have critical stenosis of his critical cervical L ICA origin stenosis and some concern of associated thrombus, on heparin with therapeutic anti-Xa 0.55 (has been therapeutic since 6/26 14:20)    He denies any complaints overnight. No new neurologic deficits. Plan:  - Continue low dose/no bolus heparin gtt for 3 - 5 days (started 6/25/22 @ 2 PM), then repeat vessel imaging. Ultimately duration of heparinization will be determined by neurosurgery but given size of thrombus I favor 5 days heparin.  - STAT head CT and Ct-A for any acute neurologic changes  - Q1h neuro checks until he has been stable and therapeutic on heparin x 24 hours, then q2h. OK for q2h neuro checks overnight.   - Continue statin  - Telemetry while inpatient  - PT/OT/SLP  - Will need stroke education at discharge  - Will need to follow up with Neurology after discharge    YASMANY Hogan - [facility]   Neurology & Neurocritical Care   Neurology Line: [phone]  [employer]: [facility] Neurology & Neuro Critical Care NPs  6/27/2022 10:03 AM    I spent 35 minutes in the care of this patient. Over 50% of that time was in face-to-face counseling regarding disease process, diagnostic testing, preventative measures, and answering patient and family questions.

## 2022-06-27 NOTE — PLAN OF CARE
Problem: Pain  Goal: Verbalizes/displays adequate comfort level or baseline comfort level  6/27/2022 0106 by Quintin Saul RN  Outcome: Progressing  Flowsheets (Taken 6/26/2022 2000)  Verbalizes/displays adequate comfort level or baseline comfort level:   Encourage patient to monitor pain and request assistance   Assess pain using appropriate pain scale   Administer analgesics based on type and severity of pain and evaluate response     Problem: Safety - Adult  Goal: Free from fall injury  6/27/2022 0106 by Quintin Saul RN  Outcome: Progressing  Flowsheets (Taken 6/27/2022 0053)  Free From Fall Injury: Instruct family/caregiver on patient safety     Problem: Chronic Conditions and Co-morbidities  Goal: Patient's chronic conditions and co-morbidity symptoms are monitored and maintained or improved  6/27/2022 0106 by Quintin Saul RN  Outcome: Progressing     Problem: ABCDS Injury Assessment  Goal: Absence of physical injury  6/27/2022 0106 by Quintin Saul RN  Outcome: Progressing  Flowsheets (Taken 6/27/2022 0053)  Absence of Physical Injury: Implement safety measures based on patient assessment     Problem: Neurosensory - Adult  Goal: Achieves stable or improved neurological status  6/27/2022 0106 by Quintin Saul RN  Outcome: Progressing  Flowsheets (Taken 6/26/2022 1900)  Achieves stable or improved neurological status:   Assess for and report changes in neurological status   Initiate measures to prevent increased intracranial pressure   Maintain blood pressure and fluid volume within ordered parameters to optimize cerebral perfusion and minimize risk of hemorrhage   Monitor temperature, glucose, and sodium.  Initiate appropriate interventions as ordered     Problem: Skin/Tissue Integrity - Adult  Goal: Skin integrity remains intact  6/27/2022 0106 by Quintin Saul RN  Outcome: Progressing  Flowsheets  Taken 6/27/2022 0053  Skin Integrity Remains Intact:   Monitor for areas of redness and/or skin breakdown   Every 4-6 hours minimum: Change oxygen saturation probe site  Taken 6/26/2022 2000  Skin Integrity Remains Intact:   Monitor for areas of redness and/or skin breakdown   Every 4-6 hours minimum: Change oxygen saturation probe site

## 2022-06-27 NOTE — CARE COORDINATION
Case Management Assessment           Daily Note                 Date/ Time of Note: 6/27/2022 2:12 PM         Note completed by: Sammie Verma RN    Patient Name: Salena Blackburn  YOB: 1955    Diagnosis:Acute CVA (cerebrovascular accident) [facility]) [I63.9]  Ischemic stroke [facility]) [I63.9]  Patient Admission Status: Inpatient    Date of Admission:6/25/2022  5:29 AM Length of Stay: 2 GLOS: GMLOS: 2.1    Current Plan of Care: Heparin gtt. Q1h neurochecks. PT/OTSLP  ________________________________________________________________________________________  PT AM-PAC: 24 / 24 per last evaluation on: 6/24    OT AM-PAC: 24 / 24 per last evaluation on: 6/24  _  Discharge Plan: Home    Case Management Notes: Pt is from home with spouse. Independent prior to admission     Aristeo and his family were provided with choice of provider; he and his family are in agreement with the discharge plan.     Care Transition Patient: No    Sammie Verma RN  [facility]  Case Management Department  [phone]

## 2022-06-27 NOTE — PROGRESS NOTES
ICU Progress Note    Admit Date: 6/25/2022  Diet: ADULT DIET; Regular; 4 carb choices (60 gm/meal)    CC: dysarthria, right hand/arm numbness and weakness     Interval history:   Patient with Myrtie El or any neuro deficits this morning. Heparin therapeutic since yesterday afternoon. If remains therapeutic on hep gtt later this afternoon, then can txf out of ICU with transfer checks. HPI:  Yusuf Fuad a 75 y. o. male with PMH as below notable for HTN, alcohol abuse, tobacco use who presented with dysarthria. Time of onset was reported at 12:20 PM. Patient report he was working when he started having slurred speech, patient reports he started also feeling numbness in his R arm.  Patient decided to consult. Patient reports his symptoms lasted for approximately 3 hours. Patient denies current alcohol use. He states he smokes 50 packs/year history, currently smokes 1 pack. Lives at home with his family.      The patient denies abdominal or flank pain, anorexia, nausea or vomiting, dysphagia, change in bowel habits or black or bloody stools or weight loss. Patient denies any exertional chest pain, dyspnea, palpitations, syncope, orthopnea, edema or paroxysmal nocturnal dyspnea. The patient denies dysuria, frequency or hematuria.  He denies constitutional symptoms of fatigue, weakness, weight loss or gain, fevers, night sweats.      On admission patient was hemodynamically stable and afebrile. Patient was AOx4 with dysarthria, no aphasia, R arm weakness with positive drift, also R sided facial droop.  NIH 4. WBC 12. EKG did not show any acute ischemic changes. Trop neg. CXR did not show any acute cardiopulmonary abnormality. CT head did not show any acute intracranial abnormality.  CTA head showed critical stenosis at the origin of the left internal carotid artery.  Thrombus projecting into the lumen at the origin of the left internal carotid artery, at risk for contributing to distal emboli.  stroke team was consulted. Patient not candidate for tPA due to symptoms outside window. NSGY was consulted recommended heparin drip and MR based non invasive vascular imaging to confirm presence of intraluminal thrombus within the cervical left ICA origin.      Patient was admitted to the ICU for further workup and management of ischemic stroke vs TIA. Medications:     Scheduled Meds:   pantoprazole  40 mg Oral QAM AC    rosuvastatin  40 mg Oral Nightly     Continuous Infusions:   heparin (PORCINE) Infusion 16 Units/kg/hr (06/27/22 0633)     PRN Meds:ondansetron **OR** ondansetron, polyethylene glycol, perflutren lipid microspheres    Objective:   Vitals:   T-max:  Patient Vitals for the past 8 hrs:   BP Temp Temp src Pulse Resp SpO2 Weight   06/27/22 0600 -- -- -- (!) 44 15 100 % 138 lb 10.7 oz (62.9 kg)   06/27/22 0500 (!) 144/75 -- -- (!) 47 10 96 % --   06/27/22 0400 139/87 98.1 °F (36.7 °C) Oral (!) 46 16 100 % --   06/27/22 0300 (!) 140/74 -- -- (!) 46 17 100 % --   06/27/22 0200 138/72 -- -- 50 15 98 % --   06/27/22 0100 (!) 140/76 -- -- 53 17 97 % --   06/27/22 0000 (!) 142/76 98.1 °F (36.7 °C) Oral 55 15 96 % --       Intake/Output Summary (Last 24 hours) at 6/27/2022 0724  Last data filed at 6/27/2022 1877  Gross per 24 hour   Intake 744.58 ml   Output --   Net 744.58 ml       Review of Systems   HENT: Positive for facial swelling. ROS: a 10 point ROS was conducted and significant findings as noted in the HPI. Physical Exam  Constitutional:       General: He is not in acute distress. Appearance: Normal appearance. He is normal weight. HENT:      Head: Normocephalic and atraumatic. Right Ear: External ear normal.      Left Ear: External ear normal.      Nose: Nose normal.      Mouth/Throat:      Mouth: Mucous membranes are moist.      Pharynx: Oropharynx is clear. Eyes:      Extraocular Movements: Extraocular movements intact.    Cardiovascular:      Rate and Rhythm: Normal rate and regular rhythm. Heart sounds: Normal heart sounds. Pulmonary:      Effort: Pulmonary effort is normal.      Breath sounds: Normal breath sounds. Abdominal:      General: Abdomen is flat. Bowel sounds are normal.      Palpations: Abdomen is soft. Musculoskeletal:         General: No swelling. Normal range of motion. Right lower leg: No edema. Left lower leg: No edema. Skin:     General: Skin is warm and dry. Neurological:      General: No focal deficit present. Mental Status: He is oriented to person, place, and time. LABS:    CBC:   Recent Labs     06/24/22 2123 06/26/22 0521 06/27/22 0537   WBC 12.4* 11.7* 11.1*   HGB 12.4* 12.7* 12.7*   HCT 37.6* 38.9* 38.5*    202 308   MCV 81.6 82.6 82.3     Renal:    Recent Labs     06/24/22 2123 06/26/22 0521 06/27/22 0537    136 137   K 3.7 4.3 3.9    104 102   CO2 26 20* 24   BUN 11 14 13   CREATININE 0.9 0.8 0.8   GLUCOSE 108* 77 77   CALCIUM 9.4 9.1 9.2   MG  --  2.00 1.90   PHOS  --  3.3 3.6   ANIONGAP 11 12 11     Hepatic:   Recent Labs     06/24/22 2123 06/26/22 0521 06/27/22  0537   AST 20  --   --    ALT 12  --   --    BILITOT 0.5  --   --    PROT 7.7  --   --    LABALBU 4.4 3.8 4.0   ALKPHOS 101  --   --      Troponin:   Recent Labs     06/24/22 2123   TROPONINI <0.01     BNP: No results for input(s): BNP in the last 72 hours. Lipids:   Recent Labs     06/26/22 0521   CHOL 145   HDL 55     ABGs:  No results for input(s): PHART, JTP6NLS, PO2ART, FUT6HQY, BEART, THGBART, C5UDLANB, DJQ2UED in the last 72 hours. INR:   Recent Labs     06/24/22 2123   INR 1.12     Lactate: No results for input(s): LACTATE in the last 72 hours. Cultures:  -----------------------------------------------------------------  RAD:   MRA NECK W WO CONTRAST   Final Result      1.  Critical stenosis (greater than 90% NASCET criteria) of the left internal carotid artery origin related to densely calcified plaque when correlating with CTA appearance with additional contiguous noncalcified intraluminal filling defect suspicious for    thrombus in the proximal bulbar segment of the left internal carotid artery   2. No right carotid artery stenosis   3. No vertebral artery stenosis      MRA HEAD WO CONTRAST   Final Result      No intracranial large vessel occlusion or significant stenosis         MRI BRAIN WO CONTRAST   Final Result   1. Multiple foci of restricted diffusion, acute infarct in left middle cerebral artery territory in the left frontal, left parietal and temporal parietal lobe cortex. 2. Underlying moderate to severe chronic small vessel disease, periventricular microangiopathic leukoencephalopathy. Assessment/Plan:     Left ICA Stenosis, thrombus in bulbar segment of left ICA, Acute stroke (L MCA, frontal, temporal, parital  Presented with dysarthria, no aphasia, R arm weakness with positive drift, also R sided facial droop. CT head did not show any acute intracranial abnormality.  CTA head showed critical stenosis at the origin of the left internal carotid artery. Thrombus projecting into the lumen at the origin of the left internal carotid artery, at risk for contributing to distal emboli. - q1h neurochecks  - MRA head normal  - MRA neck showing \"critical stenosis of L ICA, and filling defect suspicious for thrombus in the proximal bulbar segment of the L ICA\"  - MRI brain \"Multiple foci of restricted diffusion, acute infarct in left middle cerebral artery territory in the left frontal, left parietal and temporal parietal lobe cortex. \"  - Will discuss with NSGY about intervention and heparin gtt  - Permissive HTN BP goal <210/120   - Keep HOB >30 degrees  - Statin  - Has allergy to ASA (hives), Will continue on plavix  - Echo w/ bubble overall normal, no PFO  - Neurology consulted- appreciate recs   - repeat CT and contact NSGY for any changes in neuro exam  - On low dose heparin gtt (no bolus) for thrombus, will txf to floor if remains therapeutic on hep gtt later this afternoon with neuro checks q2h     Leukocytosis likely reactive  -Monitor VS  -F/u CBC     Tobacco use  -Avoid nicotine patch      Code Status: Full Code  FEN: ADULT DIET; Regular; 4 carb choices (60 gm/meal)  PPX: SCDs, protonix  DISPO: ICU, txf to floor if hep gtt remains stable. Juan Luis Scott DO, PGY-1  06/27/22  7:24 AM    This patient has been staffed and discussed with Seun Wheeler MD.     Patient reviewed, findings as discussed with Dr. Cindy Kellogg. Agree with assessment and plan. Clinically stable, adjusting anticoagulation to limit risk of recurrent injury. Blood pressure remains well controlled.   Discussed with neurology service

## 2022-06-27 NOTE — FLOWSHEET NOTE
06/27/22 1800   Vitals   Heart Rate 51   Heart Rate Source Monitor   Resp 18   BP (!) 139/99   MAP (mmHg) 112   BP Location Right upper arm   BP Upper/Lower Upper   BP Method Automatic   Patient Position Semi fowlers   Level of Consciousness Alert (0)   Cardiac Rhythm Sinus bunny   Oxygen Therapy   SpO2 98 %     Patient resting comfortably in bed at this time, no s/s of distress, denies pain, VSS.

## 2022-06-27 NOTE — ED PROVIDER NOTES
In addition to the advanced practice provider, I personally saw Rachell Michelle and performed a substantive portion of the visit including all aspects of the medical decision making. Briefly, this is a 75 y.o. male here for ER for evaluation of positive dysarthria right face paresthesia right arm paresthesia, duration of symptoms have been at least 8 to 12 hours postevent, no palpitations, no headache, no anticoagulation. History is obtained from the patient, as well as the patient's wife. NIH stroke scale on arrival is of 4-5, with a GCS of 15. EKG  EKG was reviewed by emergency department physician in the absence of a cardiologist    Narrow complex sinus rhythm, rate , normal axis, normal OR and QRS intervals, normal Qtc, no ST elevations or depressions, normal t-wave morphology, impression NSR, no STEMI      Screenings  NIH Stroke Scale  Interval: Baseline  Level of Consciousness (1a): Alert  LOC Questions (1b): Answers both correctly  LOC Commands (1c): Performs both tasks correctly  Best Gaze (2): Normal  Visual (3): No visual loss  Facial Palsy (4): (!) Minor paralysis  Motor Arm, Left (5a): No drift  Motor Arm, Right (5b): No drift  Motor Leg, Left (6a): No drift  Motor Leg, Right (6b): No drift  Limb Ataxia (7): Absent  Sensory (8): (!) Mild to Moderate  Best Language (9): No aphasia  Dysarthria (10): Mild to moderate, slurs some words  Extinction and Inattention (11): No abnormality  Total: 3Glasgow Coma Scale  Eye Opening: Spontaneous  Best Verbal Response: Oriented  Best Motor Response: Obeys commands  [place] Coma Scale Score: 15        MDM  Presented to the ER for evaluation clinical signs of stroke with possible multiple vascular territories, CTA reveals large vessel occlusion in the left internal carotid artery, with evidence of thrombus. Consultation with vascular surgery as well as interventional neurosurgery and stroke team all were performed.   He is not a candidate for thrombolysis due

## 2022-06-27 NOTE — PROGRESS NOTES
Progress Note  Admit Date: 6/25/2022             75 y.o. male with PMH as below notable for HTN, alcohol abuse, tobacco use who presented with dysarthria. Time of onset was reported at 12:20 PM. Patient report he was working when he started having slurred speech, patient reports he started also feeling numbness in his R arm. Patient decided to consult. Patient reports his symptoms lasted for approximately 3 hours. Patient denies current alcohol use. He states he smokes 50 packs/year history, currently smokes 1 pack. Lives at home with his family.      The patient denies abdominal or flank pain, anorexia, nausea or vomiting, dysphagia, change in bowel habits or black or bloody stools or weight loss. Patient denies any exertional chest pain, dyspnea, palpitations, syncope, orthopnea, edema or paroxysmal nocturnal dyspnea. The patient denies dysuria, frequency or hematuria. He denies constitutional symptoms of fatigue, weakness, weight loss or gain, fevers, night sweats.      On admission patient was hemodynamically stable and afebrile. Patient was AOx4 with dysarthria, no aphasia, R arm weakness with positive drift, also R sided facial droop. NIH 4. WBC 12. EKG did not show any acute ischemic changes. Trop neg. CXR did not show any acute cardiopulmonary abnormality. CT head did not show any acute intracranial abnormality. CTA head showed critical stenosis at the origin of the left internal carotid artery. Thrombus projecting into the lumen at the origin of the left internal carotid artery, at risk for contributing to distal emboli.  stroke team was consulted. Patient not candidate for tPA due to symptoms outside window.  NSGY was consulted recommended heparin drip and MR based non invasive vascular imaging to confirm presence of intraluminal thrombus within the cervical left ICA origin.      Patient was admitted to the ICU for further workup and management       Interval History: No new issues  Continues to be on heparin gtt    No chest pain  No dyspnea    Awake alert, family at bedside. Review of Systems - Negative except as in HPI  All other systems reviewed and are negative. .     Scheduled Medications:    pantoprazole  40 mg Oral QAM AC    rosuvastatin  40 mg Oral Nightly      PRN Medications: ondansetron **OR** ondansetron, polyethylene glycol, perflutren lipid microspheres  Diet: ADULT DIET;  Regular; 4 carb choices (60 gm/meal)    Continuous Infusions:   heparin (PORCINE) Infusion 18 Units/kg/hr (06/27/22 1400)       PHYSICAL EXAM:  /71   Pulse (!) 47   Temp 98.3 °F (36.8 °C) (Oral)   Resp 13   Ht 5' 9\" (1.753 m)   Wt 138 lb 10.7 oz (62.9 kg)   SpO2 100%   BMI 20.48 kg/m²   Recent Labs     06/24/22  2108   POCGLU 107*       Intake/Output Summary (Last 24 hours) at 6/27/2022 1525  Last data filed at 6/27/2022 1330  Gross per 24 hour   Intake 784.73 ml   Output --   Net 784.73 ml     General appearance: alert, appears stated age and cooperative  Head: Normocephalic, without obvious abnormality, atraumatic  Neck: no adenopathy, no carotid bruit, no JVD, supple, symmetrical, trachea midline and thyroid not enlarged, symmetric, no tenderness/mass/nodules  Lungs: clear to auscultation bilaterally  Chest wall: no tenderness  Heart: regular rate and rhythm, S1, S2 normal, no murmur, click, rub or gallop  Abdomen: soft, non-tender; bowel sounds normal; no masses,  no organomegaly  Extremities: extremities normal, atraumatic, no cyanosis or edema  Pulses: 2+ and symmetric  Neurologic: Grossly normal    LABS:  Recent Labs     06/24/22 2123 06/26/22  0521 06/27/22  0537   WBC 12.4* 11.7* 11.1*   HGB 12.4* 12.7* 12.7*   HCT 37.6* 38.9* 38.5*    202 308                                                                    Recent Labs     06/24/22 2123 06/26/22  0521 06/27/22  0537    136 137   K 3.7 4.3 3.9    104 102   CO2 26 20* 24   BUN 11 14 13   CREATININE 0.9 0.8 0.8   GLUCOSE 108* 77 77     Recent Labs     06/24/22 2123   AST 20   ALT 12   BILITOT 0.5   ALKPHOS 101     Recent Labs     06/24/22 2123   TROPONINI <0.01     No results for input(s): BNP in the last 72 hours. Recent Labs     06/26/22  0521   CHOL 145   HDL 55     Recent Labs     06/24/22 2123   INR 1.12       Assessment & Plan:    75 y.o. male with HLD, Incomplete RBB with GERD admitted with Right facial weakness with right upper extremity weakness and numbness      Acute CVA left MCA territory: POA  Critical cervical R ICA stenosis with probable  intraluminal thrombus: POA  - Appears thromboembolic CVA from LIZ stenosis, withmultiple risk factors for atherosclerotic CVD: tobacco abuse, HTN, \"Prediabetes\" and hyperlipidemia  - Pxt started on heparin gtt  - Keep HOB >30 degrees  - High intensity Statin  - Has allergy to ASA so placed on Plavix   - Echo w/ bubble : \"EFf 55-60%. No RWMA;  A bubble study was performed and there are late bubbles suggestive of pulmonary AVMs. (no PFO). - Lipid Panel: LDL: 79, Hb A1c: 5.6  - Neurochecks  - Neurology following  - NSGY following for thrombus in the proximal bulbar segment of the left internal carotid artery: On Low-dose, no-bolus systemic heparinization protocol. Planned Repeat MR after 3-5 days of heparin gtt  - Counseling re tobacco abuse  - Keep on telemetry       GERD (gastroesophageal reflux disease)     Primary hypertension: Monitor BPs     Incomplete RBBB. Echo, telemetry     Alcohol abuse: Monitor for withdrawal.      Prediabetes: Update A1C     History of drug abuse in remission      Chronic hepatitis C virus genotype 1 infection: O/p F/u      The patient and / or the family were informed of the results of any tests, a time was given to answer questions, a plan was proposed and they agreed with plan. Full Code    Disposition: Remains in ICU  3-5 days heparin gtt (started 6/25/22) , then repeat imaging per NSGY to determine if will need further intervention.    PT/OT eval

## 2022-06-28 LAB
ALBUMIN SERPL-MCNC: 4.1 G/DL (ref 3.4–5)
ANION GAP SERPL CALCULATED.3IONS-SCNC: 11 MMOL/L (ref 3–16)
ANTI-XA UNFRAC HEPARIN: 0.36 IU/ML (ref 0.3–0.7)
ANTI-XA UNFRAC HEPARIN: 0.58 IU/ML (ref 0.3–0.7)
ANTI-XA UNFRAC HEPARIN: 0.62 IU/ML (ref 0.3–0.7)
BASOPHILS ABSOLUTE: 0 K/UL (ref 0–0.2)
BASOPHILS RELATIVE PERCENT: 0.5 %
BUN BLDV-MCNC: 12 MG/DL (ref 7–20)
CALCIUM SERPL-MCNC: 9.3 MG/DL (ref 8.3–10.6)
CHLORIDE BLD-SCNC: 102 MMOL/L (ref 99–110)
CO2: 24 MMOL/L (ref 21–32)
CREAT SERPL-MCNC: 0.9 MG/DL (ref 0.8–1.3)
EOSINOPHILS ABSOLUTE: 0.2 K/UL (ref 0–0.6)
EOSINOPHILS RELATIVE PERCENT: 2.1 %
GFR AFRICAN AMERICAN: >60
GFR NON-AFRICAN AMERICAN: >60
GLUCOSE BLD-MCNC: 131 MG/DL (ref 70–99)
HCT VFR BLD CALC: 38.8 % (ref 40.5–52.5)
HEMOGLOBIN: 12.6 G/DL (ref 13.5–17.5)
LYMPHOCYTES ABSOLUTE: 2.4 K/UL (ref 1–5.1)
LYMPHOCYTES RELATIVE PERCENT: 23.4 %
MAGNESIUM: 1.9 MG/DL (ref 1.8–2.4)
MCH RBC QN AUTO: 26.7 PG (ref 26–34)
MCHC RBC AUTO-ENTMCNC: 32.4 G/DL (ref 31–36)
MCV RBC AUTO: 82.5 FL (ref 80–100)
MONOCYTES ABSOLUTE: 0.6 K/UL (ref 0–1.3)
MONOCYTES RELATIVE PERCENT: 5.9 %
NEUTROPHILS ABSOLUTE: 7.1 K/UL (ref 1.7–7.7)
NEUTROPHILS RELATIVE PERCENT: 68.1 %
PDW BLD-RTO: 14.3 % (ref 12.4–15.4)
PHOSPHORUS: 3.8 MG/DL (ref 2.5–4.9)
PLATELET # BLD: 278 K/UL (ref 135–450)
PMV BLD AUTO: 8.1 FL (ref 5–10.5)
POTASSIUM SERPL-SCNC: 3.9 MMOL/L (ref 3.5–5.1)
RBC # BLD: 4.71 M/UL (ref 4.2–5.9)
SODIUM BLD-SCNC: 137 MMOL/L (ref 136–145)
WBC # BLD: 10.4 K/UL (ref 4–11)

## 2022-06-28 PROCEDURE — 36415 COLL VENOUS BLD VENIPUNCTURE: CPT

## 2022-06-28 PROCEDURE — 85520 HEPARIN ASSAY: CPT

## 2022-06-28 PROCEDURE — 6360000002 HC RX W HCPCS: Performed by: STUDENT IN AN ORGANIZED HEALTH CARE EDUCATION/TRAINING PROGRAM

## 2022-06-28 PROCEDURE — 85025 COMPLETE CBC W/AUTO DIFF WBC: CPT

## 2022-06-28 PROCEDURE — 6370000000 HC RX 637 (ALT 250 FOR IP): Performed by: STUDENT IN AN ORGANIZED HEALTH CARE EDUCATION/TRAINING PROGRAM

## 2022-06-28 PROCEDURE — 83735 ASSAY OF MAGNESIUM: CPT

## 2022-06-28 PROCEDURE — 80069 RENAL FUNCTION PANEL: CPT

## 2022-06-28 PROCEDURE — 99232 SBSQ HOSP IP/OBS MODERATE 35: CPT | Performed by: NURSE PRACTITIONER

## 2022-06-28 PROCEDURE — 1200000000 HC SEMI PRIVATE

## 2022-06-28 PROCEDURE — 6370000000 HC RX 637 (ALT 250 FOR IP)

## 2022-06-28 RX ADMIN — HEPARIN SODIUM 18 UNITS/KG/HR: 10000 INJECTION, SOLUTION INTRAVENOUS at 11:12

## 2022-06-28 RX ADMIN — ROSUVASTATIN CALCIUM 40 MG: 20 TABLET, COATED ORAL at 20:24

## 2022-06-28 RX ADMIN — PANTOPRAZOLE SODIUM 40 MG: 40 TABLET, DELAYED RELEASE ORAL at 07:49

## 2022-06-28 ASSESSMENT — PAIN SCALES - GENERAL
PAINLEVEL_OUTOF10: 0

## 2022-06-28 NOTE — PROGRESS NOTES
Patient assessed to the bathroom at this time with stand by assist. Neuro assessment remains unchanged. Patient A&Ox4, pupils round and reactive, and able to follow commands. Patient assisted back to bed, bed alarm engaged, and call light within reach. Will continue to monitor.

## 2022-06-28 NOTE — PROGRESS NOTES
Progress Note  Admit Date: 6/25/2022             75 y.o. male with PMH as below notable for HTN, alcohol abuse, tobacco use who presented with dysarthria. Time of onset was reported at 12:20 PM. Patient report he was working when he started having slurred speech, patient reports he started also feeling numbness in his R arm. Patient decided to consult. Patient reports his symptoms lasted for approximately 3 hours. Patient denies current alcohol use. He states he smokes 50 packs/year history, currently smokes 1 pack. Lives at home with his family.      The patient denies abdominal or flank pain, anorexia, nausea or vomiting, dysphagia, change in bowel habits or black or bloody stools or weight loss. Patient denies any exertional chest pain, dyspnea, palpitations, syncope, orthopnea, edema or paroxysmal nocturnal dyspnea. The patient denies dysuria, frequency or hematuria. He denies constitutional symptoms of fatigue, weakness, weight loss or gain, fevers, night sweats.      On admission patient was hemodynamically stable and afebrile. Patient was AOx4 with dysarthria, no aphasia, R arm weakness with positive drift, also R sided facial droop. NIH 4. WBC 12. EKG did not show any acute ischemic changes. Trop neg. CXR did not show any acute cardiopulmonary abnormality. CT head did not show any acute intracranial abnormality. CTA head showed critical stenosis at the origin of the left internal carotid artery. Thrombus projecting into the lumen at the origin of the left internal carotid artery, at risk for contributing to distal emboli.  stroke team was consulted. Patient not candidate for tPA due to symptoms outside window.  NSGY was consulted recommended heparin drip and MR based non invasive vascular imaging to confirm presence of intraluminal thrombus within the cervical left ICA origin.      Patient was admitted to the ICU for further workup and management       Interval History: No new issues  Continues to be on heparin gtt    No chest pain  No dyspnea    Awake, alert      Review of Systems - Negative except as in HPI  All other systems reviewed and are negative. .     Scheduled Medications:    pantoprazole  40 mg Oral QAM AC    rosuvastatin  40 mg Oral Nightly      PRN Medications: ondansetron **OR** ondansetron, polyethylene glycol, perflutren lipid microspheres  Diet: ADULT DIET; Regular; 4 carb choices (60 gm/meal)    Continuous Infusions:   heparin (PORCINE) Infusion 16 Units/kg/hr (06/28/22 1113)       PHYSICAL EXAM:  /70   Pulse (!) 112   Temp 98.3 °F (36.8 °C) (Oral)   Resp 24   Ht 5' 9\" (1.753 m)   Wt 138 lb 10.7 oz (62.9 kg)   SpO2 99%   BMI 20.48 kg/m²   No results for input(s): POCGLU in the last 72 hours.     Intake/Output Summary (Last 24 hours) at 6/28/2022 1713  Last data filed at 6/28/2022 1000  Gross per 24 hour   Intake 480 ml   Output --   Net 480 ml     General appearance: alert, appears stated age and cooperative  Head: Normocephalic, without obvious abnormality, atraumatic  Neck: no adenopathy, no carotid bruit, no JVD, supple, symmetrical, trachea midline and thyroid not enlarged, symmetric, no tenderness/mass/nodules  Lungs: clear to auscultation bilaterally  Chest wall: no tenderness  Heart: regular rate and rhythm, S1, S2 normal, no murmur, click, rub or gallop  Abdomen: soft, non-tender; bowel sounds normal; no masses,  no organomegaly  Extremities: extremities normal, atraumatic, no cyanosis or edema  Pulses: 2+ and symmetric  Neurologic: Grossly normal    LABS:  Recent Labs     06/26/22  0521 06/27/22  0537 06/28/22  0547   WBC 11.7* 11.1* 10.4   HGB 12.7* 12.7* 12.6*   HCT 38.9* 38.5* 38.8*    308 278                                                                    Recent Labs     06/26/22  0521 06/27/22  0537 06/28/22  0547    137 137   K 4.3 3.9 3.9    102 102   CO2 20* 24 24   BUN 14 13 12   CREATININE 0.8 0.8 0.9   GLUCOSE 77 77 131*     No results for intervention: Timing per NSGY. PT/OT eval thereafter: should likely be able to go home at discharge.      Angie Morrison MD

## 2022-06-28 NOTE — PROGRESS NOTES
NEUROLOGY / NEUROCRITICAL CARE PROGRESS NOTE       Patient Name: Siobhan Collier YOB: 1955   Sex: Male Age: 75 yrs     CC / Reason for Consult: ischemic stroke vs TIA in the context of L ICA origin stenosis with concern of intraluminal thrombus formation    Interval Hx / Changes over last 24 hours:   No new changes overnight     ROS: No headache, no focal weakness, no speech difficulties    HISTORY   Admission HPI:   Siobhan Collier is a 75 y.o. y/o male with PMH significant for arthritis, GERD, HLD, incomplete RBB.      Reported time of onset at 12:20 with slurred speech. Per my interview with the patient, reports that he also had difficulty grasping objects with his R hand and felt like it was more difficult to walk, but does not think that he was necessarily weak. Patient's wife last saw patient normal before leaving for work that morning. He presented to the ER and the stroke team was notified. ER provider noted that the patient was dysarthric, had a RUE drift as well as a R facial droop and some RLE numbness. CT head with no acute abnormality. CTA head and neck reviewed by Neurovascular, demonstrates critical cervical L ICA origin stenosis without associated tandem intracranial occlusion with concern for potential associated intraluminal thrombus formation but there is streak artifact in this location as well. Not a candidate for TPA due to his presentation time. He was transferred to [facility] for further management, planning for MRI brain and MRA head and neck and potential low dose no bolus heparin gtt depending on MRAs.     Patient reports he does not have a history of high blood pressure, reports that last time he was at his PCP his systolic was 655. Reports he does not take a statin at home and that he is allergic to aspirin \"I broke out in hives\". He is a current smoker.  Currently reports that he feels like he has occaisional word finding difficulty, but feels that his speech does not seem slurred to him and his symptoms have significantly improved/otherwise resolved. No facial weakness or pronator drift observed. NIHSS 1 for reported word finding difficulty though he does well with formal testing. He denies associated headache, dizziness, lightheadedness, numbness/tingling. He has never had anything like this happen before. PMH Past Medical History:   Diagnosis Date    Arthritis     GERD (gastroesophageal reflux disease)     Hyperlipidemia     Hyperlipidemia 05/27/2012    Incomplete RBBB     Ruptured disk 2019    Stroke 06/25/2022    Varicose veins of both lower extremities       Allergies Allergies   Allergen Reactions    Aspirin Hives     Patient reports he got hives while taking ASA    Pcn [Penicillins] Hives      Diet ADULT DIET; Regular; 4 carb choices (60 gm/meal)   Isolation No active isolations     CURRENT SCHEDULED MEDICATIONS   Inpatient Medications     pantoprazole, 40 mg, Oral, QAM AC    rosuvastatin, 40 mg, Oral, Nightly   Infusions    heparin (PORCINE) Infusion 18 Units/kg/hr (06/28/22 0309)        LABS   Metabolic Panel Recent Labs     06/26/22  0521 06/27/22  0537 06/28/22  0547    137 137   K 4.3 3.9 3.9    102 102   CO2 20* 24 24   BUN 14 13 12   CREATININE 0.8 0.8 0.9   GLUCOSE 77 77 131*   CALCIUM 9.1 9.2 9.3   LABALBU 3.8 4.0 4.1   PHOS 3.3 3.6 3.8   MG 2.00 1.90 1.90      CBC / Coags Recent Labs     06/26/22  0521 06/27/22  0537 06/28/22  0547   WBC 11.7* 11.1* 10.4   RBC 4.71 4.68 4.71   HGB 12.7* 12.7* 12.6*   HCT 38.9* 38.5* 38.8*    308 278      Other Recent Labs     06/26/22  0521   LABA1C 5.6   LDLCALC 79   TRIG 57        DIAGNOSTICS   IMAGES:  No new imaging to review today     MRI brain w/o giuliana  1. Multiple foci of restricted diffusion, acute infarct in left middle cerebral artery territory in the left frontal, left parietal and temporal parietal lobe cortex.       2.  Underlying moderate to severe chronic small vessel disease, periventricular microangiopathic leukoencephalopathy. MRA of head / neck   Impression       1. Critical stenosis (greater than 90% NASCET criteria) of the left internal carotid artery origin related to densely calcified plaque when correlating with CTA appearance with additional contiguous noncalcified intraluminal filling defect suspicious for    thrombus in the proximal bulbar segment of the left internal carotid artery   2. No right carotid artery stenosis   3. No vertebral artery stenosis     Echo   Normal left ventricle size, wall thickness, and systolic function with an   estimated ejection fraction of 55-60%. No regional wall motion abnormalities   are seen. Diastolic filling parameters suggests normal diastolic function. IVC size is normal (<2.1cm) and collapses > 50% with respiration consistent   with normal RA pressure (3mmHg). Estimated pulmonary artery systolic   pressure is at 29 mmHg assuming a right atrial pressure of 3 mmHg. A bubble   study was performed and there are late bubbles suggestive of pulmonary AVMs   (no PFO). EKG: NSR    PHYSICAL EXAMINATION     PHYSICAL EXAM:  Vitals:    06/28/22 0600 06/28/22 0700 06/28/22 0800 06/28/22 0900   BP: (!) 116/59 (!) 114/55 125/67 123/61   Pulse: (!) 47 (!) 48 (!) 46 (!) 48   Resp: 16 17 13 18   Temp:   98 °F (36.7 °C)    TempSrc:   Oral    SpO2: 100% 97% 99% 99%   Weight:       Height:          Exam   -Mental status: A&O x4; pleasant & appropriate  -Speech & Language: no aphasia; no dysarthria. Follows commands with ease.  Conversational.  -Cranial nerves: pupils symmetric; no notable dysconjugate gaze; eyes midline; no facial asymmetry  -Motor: moving all extremities symmetrically and fully  -Other: no adventitious movements noted  Other Systems  -General Appearance: well-developed, well-nourished, no apparent distress  -Neck: supple  -Lungs: breathing unlabored, regular, no audible wheezes  -CV: Warm, well perfused   -Abd: flat    ASSESSMENT & RECOMMENDATIONS     Mr. Damaris Palm is a 75 y.o. y/o male with hyperlipidemia who presented with slurred speech, right facial weakness, RLE numbness and RUE pronator drift who was found to have critical stenosis of his critical cervical L ICA origin stenosis and some concern of associated thrombus, on heparin with therapeutic anti-Xa 0.55 (has been therapeutic since 6/26 14:20)    He denies any complaints overnight. No new neurologic deficits. Plan:  - Continue low dose/no bolus heparin gtt repeat imaging tomorrow afternoon.  If no change may go for carotid stent with neurosurgery on Thursday   - STAT head CT and Ct-A for any acute neurologic changes  - Q4H Neurochecks    - Continue statin  - Telemetry while inpatient  - PT/OT/SLP  - Will need stroke education at discharge  - Will need to follow up with Neurology after discharge    YASMANY Harris - [facility]   Neurology & Neurocritical Care   Neurology Line: [phone]  [employer]: [facility] NPs  6/28/2022 9:54 AM

## 2022-06-28 NOTE — PLAN OF CARE
Problem: Pain  Goal: Verbalizes/displays adequate comfort level or baseline comfort level  Outcome: Progressing   Encourage patient to monitor pain and request assistance   Assess pain using appropriate pain scale   Administer analgesics based on type and severity of pain and evaluate response   Implement non-pharmacological measures as appropriate and evaluate response   Consider cultural and social influences on pain and pain management   Notify Licensed Independent Practitioner if interventions unsuccessful or patient reports new pain  Note: Patient denies any pain throughout the shift. Will continue to assess pain at least every four hours and PRN. Will continue to offer non pharmacological measures. Problem: Skin/Tissue Integrity - Adult  Goal: Skin integrity remains intact  Outcome: Progressing   Monitor for areas of redness and/or skin breakdown   Assess vascular access sites hourly  Note: Patient able to reposition self and walks frequently. Will continue to assess skin at least every four hours and PRN. Will offload tubes, lines, and drains. Will continue to encourage patient to reposition.

## 2022-06-28 NOTE — PROGRESS NOTES
ICU Progress Note  PGY-2    Admit Date: 6/25/2022  ICU Day: Hospital Day: 4  Vent Day:  Vent Settings:    / / /   IV Access: PIV  IV Fluids:   CVP:   Vasopressors:    Antibiotics:   Diet: ADULT DIET; Regular; 4 carb choices (60 gm/meal)  Code Status: Full Code       Interval history:  No acute events overnight. No changes in neuro exam   Pt feels well this AM, has been working with PT. Denies any paraesthesia, weakness, confusion  Denies fevers, chills, chest pain, shortness of breath, nausea, vomiting, diarrhea, or constipation. Afebrile, BP normotensive   continues on low dose heparin gtt, therapeutic this morning and Q1 neuro checks with q2 overnight    Medications:   Scheduled Meds:   pantoprazole  40 mg Oral QAM AC    rosuvastatin  40 mg Oral Nightly       Continuous Infusions:   heparin (PORCINE) Infusion 18 Units/kg/hr (06/28/22 0309)       PRN Meds:  ondansetron **OR** ondansetron, polyethylene glycol, perflutren lipid microspheres    Vital/I&O/Physical examination:   VS:  /61   Pulse (!) 48   Temp 98 °F (36.7 °C) (Oral)   Resp 18   Ht 5' 9\" (1.753 m)   Wt 138 lb 10.7 oz (62.9 kg)   SpO2 99%   BMI 20.48 kg/m²     I/O:    Intake/Output Summary (Last 24 hours) at 6/28/2022 1094  Last data filed at 6/27/2022 1330  Gross per 24 hour   Intake 600 ml   Output --   Net 600 ml       PE:  Physical Exam  Constitutional:       General: He is not in acute distress. HENT:      Head: Normocephalic and atraumatic. Mouth/Throat:      Mouth: Mucous membranes are dry. Eyes:      Extraocular Movements: Extraocular movements intact. Cardiovascular:      Rate and Rhythm: Normal rate and regular rhythm. Pulses: Normal pulses. Pulmonary:      Effort: Pulmonary effort is normal.      Breath sounds: Normal breath sounds. Abdominal:      Palpations: Abdomen is soft. Tenderness: There is no abdominal tenderness. Musculoskeletal:      Right lower leg: No edema. Left lower leg: No edema. Neurological:      General: No focal deficit present. Mental Status: He is alert and oriented to person, place, and time. Mental status is at baseline. Sensory: No sensory deficit. Motor: No weakness. Comments: No dysarthria   Psychiatric:         Mood and Affect: Mood normal.         Behavior: Behavior normal.         Labs & Imaging:   LABS:  Renal:   Recent Labs     06/26/22  0521 06/27/22  0537 06/28/22  0547    137 137   K 4.3 3.9 3.9    102 102   CO2 20* 24 24   BUN 14 13 12   CREATININE 0.8 0.8 0.9   GLUCOSE 77 77 131*   ANIONGAP 12 11 11     CBC:   Recent Labs     06/26/22  0521 06/27/22  0537 06/28/22  0547   WBC 11.7* 11.1* 10.4   HGB 12.7* 12.7* 12.6*   HCT 38.9* 38.5* 38.8*    308 278   MCV 82.6 82.3 82.5                            Hepatic: No results for input(s): AST, ALT, ALB, BILITOT, ALKPHOS in the last 72 hours. Troponin: No results for input(s): TROPONINI in the last 72 hours. BNP: No results for input(s): BNP in the last 72 hours. Lipids:   Recent Labs     06/26/22  0521   CHOL 145   HDL 55     INR: No results for input(s): INR in the last 72 hours. Lactate: No results for input(s): LACTATE in the last 72 hours. ABGs:No results for input(s): PHART, UEJ5WKD, PO2ART, OUK9MUU, BEART, THGBART, [id number], YEI2ASD in the last 72 hours. UA:No results for input(s): NITRITE, COLORU, PHUR, LABCAST, WBCUA, RBCUA, MUCUS, TRICHOMONAS, YEAST, BACTERIA, CLARITYU, SPECGRAV, LEUKOCYTESUR, UROBILINOGEN, BILIRUBINUR, BLOODU, GLUCOSEU, AMORPHOUS in the last 72 hours. Invalid input(s): KETONESU     IMAGING:  MRA NECK W WO CONTRAST   Final Result      1.  Critical stenosis (greater than 90% NASCET criteria) of the left internal carotid artery origin related to densely calcified plaque when correlating with CTA appearance with additional contiguous noncalcified intraluminal filling defect suspicious for    thrombus in the proximal bulbar segment of the left internal carotid artery   2. No right carotid artery stenosis   3. No vertebral artery stenosis      MRA HEAD WO CONTRAST   Final Result      No intracranial large vessel occlusion or significant stenosis         MRI BRAIN WO CONTRAST   Final Result   1. Multiple foci of restricted diffusion, acute infarct in left middle cerebral artery territory in the left frontal, left parietal and temporal parietal lobe cortex. 2. Underlying moderate to severe chronic small vessel disease, periventricular microangiopathic leukoencephalopathy. Assessment & Plan:      Acute L MCA ischemic stroke   High risk Left ICA thrombus and stenosis  Presented with dysarthria, R arm weakness with positive drift, also R sided facial droop. CT head did not show any acute intracranial abnormality.  CTA head showed critical stenosis at the origin of the left internal carotid artery. Thrombus projecting into the lumen at the origin of the left internal carotid artery, at risk for contributing to distal emboli. MRI/A suggestive of acute infract and thrombus. Echo w/ late bubbles suggestive of pulmonary AVMs, no PF  - cont low dose heparin infusion (started 6/25 @ 2pm, day 3) - likely 5 days duration with repeat MRI/A - will determine further intervention from NSGY  - switch to q2-4 neuro checks - likely transfer out of ICU - will confirm with neurosurgery   - NSGY and neurology following   - BP at goal   - cont crestor 40   - STAT imagining if change in neuro exam     Leukocytosis likely reactive - resolved      Tobacco use  - Avoid nicotine patch     Code Status: Full Code  FEN:ADULT DIET;  Regular; 4 carb choices (60 gm/meal)   PPX: Protonix, low dose Heparin infusion   DISPO: transfer to 5T      This patient will be discussed with attending, Dr Rayray Gentile MD PGY- 2  Contact via [employer]  6/28/2022,  9:37 AM

## 2022-06-28 NOTE — PROGRESS NOTES
NEUROSURGERY PROGRESS NOTE    6/28/2022 2:02 PM                               Nikitalillywyatt Almarazer                      LOS: 3 days     Subjective: Patient sitting up in bed upon entering the room. No acute events overnight. Patient has no specific complaints this morning. Physical Exam:  Patient seen and examined    Vitals:    06/28/22 1300   BP: (!) 108/52   Pulse: (!) 48   Resp: 16   Temp:    SpO2: 98%     GCS:  4 - Opens eyes on own  5 - Alert and oriented  6 - Follows simple motor commands  General: Well developed. Alert and cooperative in no acute distress. HENT: atraumatic, neck supple  Eyes: Optic discs: Not tested  Pulmonary: unlabored respiratory effort  Cardiovascular:  Warm well perfused. No peripheral edema  Gastrointestinal: abdomen soft, NT, ND    Neurological:  Mental Status: Awake, alert, oriented x 4, speech clear and appropriate  Attention: Intact  Language: No aphasia or dysarthria noted  Sensation: Intact to all extremities to light touch  Coordination: Intact    Cranial Nerves:  II: Visual acuity not tested, denies new visual changes / diplopia  III, IV, VI: PERRL, 3 mm bilaterally, EOMI, no nystagmus noted  V: Facial sensation intact bilaterally to touch  VII: Face symmetric  VIII: Hearing intact bilaterally to spoken voice  IX: Palate movement equal bilaterally  XI: Shoulder shrug equal bilaterally  XII: Tongue midline    Musculoskeletal:   Gait: Not tested   Assist devices: None   Tone: Normal  Motor strength:    Right  Left    Right  Left    Deltoid  5 5  Hip Flex  5 5   Biceps  5 5  Knee Extensors  5 5   Triceps  5 5  Knee Flexors  5 5   Wrist Ext  5 5  Ankle Dorsiflex. 5 5   Wrist Flex  5 5  Ankle Plantarflex. 5 5   Handgrip  5 5  Ext Ladarius Longus  5 5   Thumb Ext  5 5         Radiological Findings:  CT HEAD WO CONTRAST  Result Date: 6/24/2022  No acute intracranial abnormality. Minimal parenchymal volume loss. Mild chronic microvascular disease.      CTA HEAD NECK W CONTRAST  Result Date: 6/24/2022  Critical stenosis at the origin of the left internal carotid artery. Thrombus projecting into the lumen at the origin of the left internal carotid artery, at risk for contributing to distal emboli. Otherwise, no acute abnormality or flow-limiting stenosis in the remainder of the major arteries of the head and neck. MRI BRAIN WO CONTRAST  Result Date: 6/25/2022  1. Multiple foci of restricted diffusion, acute infarct in left middle cerebral artery territory in the left frontal, left parietal and temporal parietal lobe cortex. 2. Underlying moderate to severe chronic small vessel disease, periventricular microangiopathic leukoencephalopathy. MRA HEAD WO CONTRAST  Result Date: 6/25/2022  No intracranial large vessel occlusion or significant stenosis     MRA NECK W WO CONTRAST  Result Date: 6/25/2022  1. Critical stenosis (greater than 90% NASCET criteria) of the left internal carotid artery origin related to densely calcified plaque when correlating with CTA appearance with additional contiguous noncalcified intraluminal filling defect suspicious for thrombus in the proximal bulbar segment of the left internal carotid artery  2. No right carotid artery stenosis  3. No vertebral artery stenosis    Labs:  Recent Labs     06/28/22  0547   WBC 10.4   HGB 12.6*   HCT 38.8*          Recent Labs     06/28/22  0547      K 3.9      CO2 24   BUN 12   CREATININE 0.9   GLUCOSE 131*   CALCIUM 9.3   PHOS 3.8   MG 1.90       No results for input(s): PROTIME, INR, APTT in the last 72 hours.     Patient Active Problem List    Diagnosis Date Noted    Ischemic stroke ([address] 06/25/2022    Acute CVA (cerebrovascular accident) ([address] 06/24/2022    Primary hypertension 04/26/2022    Smoker 04/26/2022    Symptomatic varicose veins of right lower extremity     Symptomatic varicose veins of both lower extremities     Postoperative pain of extremity     Symptomatic varicose veins of left lower extremity     Acrophobia 06/18/2021    Chronic hepatitis C virus genotype 1 infection ([facility] 10/15/2020    History of drug abuse in remission ([facility] [address] 10/09/2020    Prediabetes 09/18/2020    Alcohol abuse 09/17/2020    Osteoarthritis of lumbar spine 11/22/2012    Incomplete RBBB 06/27/2012    GERD (gastroesophageal reflux disease) 05/27/2012       Assessment:  Patient is a 75 y.o. male w/ signs and symptoms consistent with an acute left hemispheric ischemic incident. MRA showed LICA stenosis with thrombus so low dose no bolus Heparin gtt started. Plan:  1. Neurologic exams frequency: Defer to Neurology recs  2. For change in exam MUST contact neurosurgery team along with critical care or primary team  3. Thrombus in the proximal bulbar segment of the left internal carotid artery:  - Low-dose, no-bolus systemic heparinization protocol  - Repeat MR-based noninvasive vascular imaging studies will then be repeated Wednesday @12:00 PM with further plan of care to be predicated upon the imaging findings. - Neurology following for stroke w/u  - Further neurointerventional surgery evaluation and management moving forward will only be necessary should thromboembolic incident and resultant ipsilateral large vessel occlusion occur. 4. Will follow inpatient. Please call with any questions or decline in neurological status    DISPO: Remain inpatient from neurosurgery standpoint. Dispo timing to be determined by primary team once patient is medically stable for discharge. Patient was discussed with Dr. Leticia Jha who agrees with above assessment and plan.      Electronically signed by: YASMANY Meyers - CNP, YASMANY-CNP, 6/28/2022 2:02 PM  [phone]

## 2022-06-29 ENCOUNTER — APPOINTMENT (OUTPATIENT)
Dept: MRI IMAGING | Age: 67
DRG: 066 | End: 2022-06-29
Attending: INTERNAL MEDICINE
Payer: COMMERCIAL

## 2022-06-29 LAB
ALBUMIN SERPL-MCNC: 3.4 G/DL (ref 3.4–5)
ANION GAP SERPL CALCULATED.3IONS-SCNC: 12 MMOL/L (ref 3–16)
ANTI-XA UNFRAC HEPARIN: 0.19 IU/ML (ref 0.3–0.7)
ANTI-XA UNFRAC HEPARIN: 0.26 IU/ML (ref 0.3–0.7)
ANTI-XA UNFRAC HEPARIN: 0.41 IU/ML (ref 0.3–0.7)
ANTI-XA UNFRAC HEPARIN: 0.59 IU/ML (ref 0.3–0.7)
BASOPHILS ABSOLUTE: 0.1 K/UL (ref 0–0.2)
BASOPHILS RELATIVE PERCENT: 1.1 %
BUN BLDV-MCNC: 15 MG/DL (ref 7–20)
CALCIUM SERPL-MCNC: 9.3 MG/DL (ref 8.3–10.6)
CHLORIDE BLD-SCNC: 103 MMOL/L (ref 99–110)
CO2: 18 MMOL/L (ref 21–32)
CREAT SERPL-MCNC: 0.8 MG/DL (ref 0.8–1.3)
EOSINOPHILS ABSOLUTE: 0.3 K/UL (ref 0–0.6)
EOSINOPHILS RELATIVE PERCENT: 2 %
GFR AFRICAN AMERICAN: >60
GFR NON-AFRICAN AMERICAN: >60
GLUCOSE BLD-MCNC: 82 MG/DL (ref 70–99)
HCT VFR BLD CALC: 37 % (ref 40.5–52.5)
HEMOGLOBIN: 12.5 G/DL (ref 13.5–17.5)
LYMPHOCYTES ABSOLUTE: 3 K/UL (ref 1–5.1)
LYMPHOCYTES RELATIVE PERCENT: 22.4 %
MAGNESIUM: 1.9 MG/DL (ref 1.8–2.4)
MCH RBC QN AUTO: 27.6 PG (ref 26–34)
MCHC RBC AUTO-ENTMCNC: 33.9 G/DL (ref 31–36)
MCV RBC AUTO: 81.4 FL (ref 80–100)
MONOCYTES ABSOLUTE: 0.8 K/UL (ref 0–1.3)
MONOCYTES RELATIVE PERCENT: 6.3 %
NEUTROPHILS ABSOLUTE: 9.1 K/UL (ref 1.7–7.7)
NEUTROPHILS RELATIVE PERCENT: 68.2 %
PDW BLD-RTO: 14.3 % (ref 12.4–15.4)
PHOSPHORUS: 3.7 MG/DL (ref 2.5–4.9)
PLATELET # BLD: 253 K/UL (ref 135–450)
PMV BLD AUTO: 9 FL (ref 5–10.5)
POTASSIUM SERPL-SCNC: 5.6 MMOL/L (ref 3.5–5.1)
RBC # BLD: 4.55 M/UL (ref 4.2–5.9)
SODIUM BLD-SCNC: 133 MMOL/L (ref 136–145)
WBC # BLD: 13.3 K/UL (ref 4–11)

## 2022-06-29 PROCEDURE — 70551 MRI BRAIN STEM W/O DYE: CPT

## 2022-06-29 PROCEDURE — 80069 RENAL FUNCTION PANEL: CPT

## 2022-06-29 PROCEDURE — 83735 ASSAY OF MAGNESIUM: CPT

## 2022-06-29 PROCEDURE — A9579 GAD-BASE MR CONTRAST NOS,1ML: HCPCS | Performed by: NURSE PRACTITIONER

## 2022-06-29 PROCEDURE — 6370000000 HC RX 637 (ALT 250 FOR IP): Performed by: STUDENT IN AN ORGANIZED HEALTH CARE EDUCATION/TRAINING PROGRAM

## 2022-06-29 PROCEDURE — 70549 MR ANGIOGRAPH NECK W/O&W/DYE: CPT

## 2022-06-29 PROCEDURE — 85520 HEPARIN ASSAY: CPT

## 2022-06-29 PROCEDURE — 6360000002 HC RX W HCPCS: Performed by: STUDENT IN AN ORGANIZED HEALTH CARE EDUCATION/TRAINING PROGRAM

## 2022-06-29 PROCEDURE — 99232 SBSQ HOSP IP/OBS MODERATE 35: CPT

## 2022-06-29 PROCEDURE — 36415 COLL VENOUS BLD VENIPUNCTURE: CPT

## 2022-06-29 PROCEDURE — 1200000000 HC SEMI PRIVATE

## 2022-06-29 PROCEDURE — 6360000004 HC RX CONTRAST MEDICATION: Performed by: NURSE PRACTITIONER

## 2022-06-29 PROCEDURE — 70544 MR ANGIOGRAPHY HEAD W/O DYE: CPT

## 2022-06-29 PROCEDURE — 85025 COMPLETE CBC W/AUTO DIFF WBC: CPT

## 2022-06-29 RX ORDER — ROSUVASTATIN CALCIUM 40 MG/1
40 TABLET, COATED ORAL NIGHTLY
Qty: 30 TABLET | Refills: 3 | Status: CANCELLED | OUTPATIENT
Start: 2022-06-29

## 2022-06-29 RX ADMIN — PANTOPRAZOLE SODIUM 40 MG: 40 TABLET, DELAYED RELEASE ORAL at 07:46

## 2022-06-29 RX ADMIN — GADOTERIDOL 13 ML: 279.3 INJECTION, SOLUTION INTRAVENOUS at 14:47

## 2022-06-29 RX ADMIN — HEPARIN SODIUM 20 UNITS/KG/HR: 10000 INJECTION, SOLUTION INTRAVENOUS at 11:46

## 2022-06-29 RX ADMIN — ROSUVASTATIN CALCIUM 40 MG: 20 TABLET, COATED ORAL at 20:43

## 2022-06-29 ASSESSMENT — PAIN SCALES - GENERAL
PAINLEVEL_OUTOF10: 0

## 2022-06-29 NOTE — PLAN OF CARE
Problem: Discharge Planning  Goal: Discharge to home or other facility with appropriate resources  Outcome: Progressing  Flowsheets (Taken 6/28/2022 2000)  Discharge to home or other facility with appropriate resources:   Identify barriers to discharge with patient and caregiver   Arrange for needed discharge resources and transportation as appropriate   Identify discharge learning needs (meds, wound care, etc)   Arrange for interpreters to assist at discharge as needed     Problem: Pain  Goal: Verbalizes/displays adequate comfort level or baseline comfort level  6/28/2022 2128 by Charo Cardenas RN  Outcome: Progressing  Flowsheets (Taken 6/28/2022 2000)  Verbalizes/displays adequate comfort level or baseline comfort level:   Encourage patient to monitor pain and request assistance   Assess pain using appropriate pain scale   Administer analgesics based on type and severity of pain and evaluate response   Implement non-pharmacological measures as appropriate and evaluate response   Consider cultural and social influences on pain and pain management   Notify Licensed Independent Practitioner if interventions unsuccessful or patient reports new pain  6/28/2022 1138 by Lorie Quarles RN  Outcome: Progressing  Flowsheets (Taken 6/27/2022 1400 by Becky Muir RN)  Verbalizes/displays adequate comfort level or baseline comfort level:   Encourage patient to monitor pain and request assistance   Assess pain using appropriate pain scale   Administer analgesics based on type and severity of pain and evaluate response   Implement non-pharmacological measures as appropriate and evaluate response   Consider cultural and social influences on pain and pain management   Notify Licensed Independent Practitioner if interventions unsuccessful or patient reports new pain  Note: Patient denies any pain throughout the shift. Will continue to assess pain at least every four hours and PRN.  Will continue to offer non pharmacological measures. Problem: Safety - Adult  Goal: Free from fall injury  Outcome: Progressing  Flowsheets (Taken 6/28/2022 2000)  Free From Fall Injury: Instruct family/caregiver on patient safety     Problem: Chronic Conditions and Co-morbidities  Goal: Patient's chronic conditions and co-morbidity symptoms are monitored and maintained or improved  Outcome: Progressing  Flowsheets (Taken 6/28/2022 2000)  Care Plan - Patient's Chronic Conditions and Co-Morbidity Symptoms are Monitored and Maintained or Improved:   Monitor and assess patient's chronic conditions and comorbid symptoms for stability, deterioration, or improvement   Collaborate with multidisciplinary team to address chronic and comorbid conditions and prevent exacerbation or deterioration     Problem: ABCDS Injury Assessment  Goal: Absence of physical injury  Outcome: Progressing  Flowsheets (Taken 6/28/2022 2000)  Absence of Physical Injury: Implement safety measures based on patient assessment     Problem: Neurosensory - Adult  Goal: Achieves stable or improved neurological status  Outcome: Progressing  Flowsheets (Taken 6/28/2022 2000)  Achieves stable or improved neurological status:   Assess for and report changes in neurological status   Initiate measures to prevent increased intracranial pressure   Maintain blood pressure and fluid volume within ordered parameters to optimize cerebral perfusion and minimize risk of hemorrhage   Monitor temperature, glucose, and sodium.  Initiate appropriate interventions as ordered  Goal: Absence of seizures  Outcome: Progressing  Goal: Remains free of injury related to seizures activity  Outcome: Progressing  Goal: Achieves maximal functionality and self care  Outcome: Progressing     Problem: Respiratory - Adult  Goal: Achieves optimal ventilation and oxygenation  Outcome: Progressing  Flowsheets (Taken 6/28/2022 2000)  Achieves optimal ventilation and oxygenation:   Assess for changes in respiratory status   Assess for changes in mentation and behavior   Position to facilitate oxygenation and minimize respiratory effort   Oxygen supplementation based on oxygen saturation or arterial blood gases   Encourage broncho-pulmonary hygiene including cough, deep breathe, incentive spirometry     Problem: Cardiovascular - Adult  Goal: Maintains optimal cardiac output and hemodynamic stability  Outcome: Progressing  Flowsheets (Taken 6/28/2022 2000)  Maintains optimal cardiac output and hemodynamic stability:   Monitor blood pressure and heart rate   Monitor urine output and notify Licensed Independent Practitioner for values outside of normal range   Assess for signs of decreased cardiac output   Administer fluid and/or volume expanders as ordered   Administer vasoactive medications as ordered  Goal: Absence of cardiac dysrhythmias or at baseline  Outcome: Progressing  Flowsheets (Taken 6/28/2022 2000)  Absence of cardiac dysrhythmias or at baseline:   Monitor cardiac rate and rhythm   Assess for signs of decreased cardiac output   Administer antiarrhythmia medication and electrolyte replacement as ordered     Problem: Skin/Tissue Integrity - Adult  Goal: Skin integrity remains intact  6/28/2022 2128 by Elgin Matias RN  Outcome: Progressing  Flowsheets (Taken 6/28/2022 2000)  Skin Integrity Remains Intact:   Monitor for areas of redness and/or skin breakdown   Assess vascular access sites hourly   Every 4-6 hours minimum: Change oxygen saturation probe site  6/28/2022 1138 by Jaydon Sandra RN  Outcome: Progressing  Flowsheets (Taken 6/27/2022 1020 by Clover Denise RN)  Skin Integrity Remains Intact:   Monitor for areas of redness and/or skin breakdown   Assess vascular access sites hourly  Note: Patient able to reposition self and walks frequently. Will continue to assess skin at least every four hours and PRN. Will offload tubes, lines, and drains. Will continue to encourage patient to reposition.    Goal: Incisions, wounds, or drain sites healing without S/S of infection  Outcome: Progressing  Goal: Oral mucous membranes remain intact  Outcome: Progressing     Problem: Musculoskeletal - Adult  Goal: Return mobility to safest level of function  Outcome: Progressing  Flowsheets (Taken 6/28/2022 2000)  Return Mobility to Safest Level of Function:   Assess patient stability and activity tolerance for standing, transferring and ambulating with or without assistive devices   Ensure adequate protection for wounds/incisions during mobilization  Goal: Maintain proper alignment of affected body part  Outcome: Progressing  Goal: Return ADL status to a safe level of function  Outcome: Progressing     Problem: Gastrointestinal - Adult  Goal: Minimal or absence of nausea and vomiting  Outcome: Progressing  Flowsheets (Taken 6/28/2022 2000)  Minimal or absence of nausea and vomiting: Provide nonpharmacologic comfort measures as appropriate  Goal: Maintains or returns to baseline bowel function  Outcome: Progressing  Flowsheets (Taken 6/28/2022 2000)  Maintains or returns to baseline bowel function:   Assess bowel function   Encourage oral fluids to ensure adequate hydration   Administer ordered medications as needed   Encourage mobilization and activity  Goal: Maintains adequate nutritional intake  Outcome: Progressing  Goal: Establish and maintain optimal ostomy function  Outcome: Progressing     Problem: Genitourinary - Adult  Goal: Absence of urinary retention  Outcome: Progressing     Problem: Infection - Adult  Goal: Absence of infection at discharge  Outcome: Progressing  Flowsheets (Taken 6/28/2022 2000)  Absence of infection at discharge:   Assess and monitor for signs and symptoms of infection   Monitor lab/diagnostic results   Monitor all insertion sites i.e., indwelling lines, tubes and drains   Monitor endotracheal (as able) and nasal secretions for changes in amount and color   [place] appropriate cooling/warming therapies per order hemorrhage   Monitor labs for bleeding or clotting disorders

## 2022-06-29 NOTE — PROGRESS NOTES
Date: 6/24/2022  Critical stenosis at the origin of the left internal carotid artery. Thrombus projecting into the lumen at the origin of the left internal carotid artery, at risk for contributing to distal emboli. Otherwise, no acute abnormality or flow-limiting stenosis in the remainder of the major arteries of the head and neck. MRI BRAIN WO CONTRAST  Result Date: 6/25/2022  1. Multiple foci of restricted diffusion, acute infarct in left middle cerebral artery territory in the left frontal, left parietal and temporal parietal lobe cortex. 2. Underlying moderate to severe chronic small vessel disease, periventricular microangiopathic leukoencephalopathy. MRA HEAD WO CONTRAST  Result Date: 6/25/2022  No intracranial large vessel occlusion or significant stenosis     MRA NECK W WO CONTRAST  Result Date: 6/25/2022  1. Critical stenosis (greater than 90% NASCET criteria) of the left internal carotid artery origin related to densely calcified plaque when correlating with CTA appearance with additional contiguous noncalcified intraluminal filling defect suspicious for thrombus in the proximal bulbar segment of the left internal carotid artery  2. No right carotid artery stenosis  3. No vertebral artery stenosis    Labs:  Recent Labs     06/28/22  0547   WBC 10.4   HGB 12.6*   HCT 38.8*          Recent Labs     06/29/22  0532   *   K 5.6*      CO2 18*   BUN 15   CREATININE 0.8   GLUCOSE 82   CALCIUM 9.3   PHOS 3.7   MG 1.90       No results for input(s): PROTIME, INR, APTT in the last 72 hours.     Patient Active Problem List    Diagnosis Date Noted    Ischemic stroke ([address] 06/25/2022    Acute CVA (cerebrovascular accident) ([address] 06/24/2022    Primary hypertension 04/26/2022    Smoker 04/26/2022    Symptomatic varicose veins of right lower extremity     Symptomatic varicose veins of both lower extremities     Postoperative pain of extremity     Symptomatic varicose veins of left lower extremity Acrophobia 06/18/2021    Chronic hepatitis C virus genotype 1 infection ([facility] [address] 10/15/2020    History of drug abuse in remission ([facility] [address] 10/09/2020    Prediabetes 09/18/2020    Alcohol abuse 09/17/2020    Osteoarthritis of lumbar spine 11/22/2012    Incomplete RBBB 06/27/2012    GERD (gastroesophageal reflux disease) 05/27/2012       Assessment:  Patient is a 75 y.o. male w/ signs and symptoms consistent with an acute left hemispheric ischemic incident. MRA showed LICA stenosis with thrombus so low dose no bolus Heparin gtt started. Plan:  Neurologic exams frequency: Defer to Neurology recs  For change in exam MUST contact neurosurgery team along with critical care or primary team  Thrombus in the proximal bulbar segment of the left internal carotid artery:  - Low-dose, no-bolus systemic heparinization protocol  - Repeat MR-based noninvasive vascular imaging studies to be reviewed by Dr. Elder Garcia with further recommendations to follow  - Neurology following for stroke w/u  Will follow inpatient. Please call with any questions or decline in neurological status    DISPO: Remain inpatient from neurosurgery standpoint. Dispo timing to be determined by primary team once patient is medically stable for discharge. Patient was discussed with Dr. Elder Garcia who agrees with above assessment and plan.      Electronically signed by: YASMANY Lozano CNP, APRN-CNP, 6/29/2022 9:22 AM  [phone]

## 2022-06-29 NOTE — PROGRESS NOTES
NEUROLOGY / NEUROCRITICAL CARE PROGRESS NOTE       Patient Name: Martell Cifuentes YOB: 1955   Sex: Male Age: 75 yrs     CC / Reason for Consult: ischemic stroke vs TIA in the context of L ICA origin stenosis with concern of intraluminal thrombus formation    Interval Hx / Changes over last 24 hours:   No new changes overnight     ROS: No headache, no focal weakness, no speech difficulties    HISTORY   Admission HPI:   Martell Cifuentes is a 75 y.o. y/o male with PMH significant for arthritis, GERD, HLD, incomplete RBB.      Reported time of onset at 12:20 with slurred speech. Per my interview with the patient, reports that he also had difficulty grasping objects with his R hand and felt like it was more difficult to walk, but does not think that he was necessarily weak. Patient's wife last saw patient normal before leaving for work that morning. He presented to the ER and the stroke team was notified. ER provider noted that the patient was dysarthric, had a RUE drift as well as a R facial droop and some RLE numbness. CT head with no acute abnormality. CTA head and neck reviewed by Neurovascular, demonstrates critical cervical L ICA origin stenosis without associated tandem intracranial occlusion with concern for potential associated intraluminal thrombus formation but there is streak artifact in this location as well. Not a candidate for TPA due to his presentation time. He was transferred to [facility] for further management, planning for MRI brain and MRA head and neck and potential low dose no bolus heparin gtt depending on MRAs.     Patient reports he does not have a history of high blood pressure, reports that last time he was at his PCP his systolic was 934. Reports he does not take a statin at home and that he is allergic to aspirin \"I broke out in hives\". He is a current smoker.  Currently reports that he feels like he has occaisional word finding difficulty, but feels that his speech does not seem slurred to him and his symptoms have significantly improved/otherwise resolved. No facial weakness or pronator drift observed. NIHSS 1 for reported word finding difficulty though he does well with formal testing. He denies associated headache, dizziness, lightheadedness, numbness/tingling. He has never had anything like this happen before. PMH Past Medical History:   Diagnosis Date    Arthritis     GERD (gastroesophageal reflux disease)     Hyperlipidemia     Hyperlipidemia 05/27/2012    Incomplete RBBB     Ruptured disk 2019    Stroke 06/25/2022    Varicose veins of both lower extremities       Allergies Allergies   Allergen Reactions    Aspirin Hives     Patient reports he got hives while taking ASA    Pcn [Penicillins] Hives      Diet ADULT DIET; Regular; 4 carb choices (60 gm/meal)   Isolation No active isolations     CURRENT SCHEDULED MEDICATIONS   Inpatient Medications     pantoprazole, 40 mg, Oral, QAM AC    rosuvastatin, 40 mg, Oral, Nightly   Infusions    heparin (PORCINE) Infusion 20 Units/kg/hr (06/29/22 0658)        LABS   Metabolic Panel Recent Labs     06/27/22  0537 06/28/22  0547 06/29/22  0532    137 133*   K 3.9 3.9 5.6*    102 103   CO2 24 24 18*   BUN 13 12 15   CREATININE 0.8 0.9 0.8   GLUCOSE 77 131* 82   CALCIUM 9.2 9.3 9.3   LABALBU 4.0 4.1 3.4   PHOS 3.6 3.8 3.7   MG 1.90 1.90 1.90      CBC / Coags Recent Labs     06/27/22  0537 06/28/22  0547   WBC 11.1* 10.4   RBC 4.68 4.71   HGB 12.7* 12.6*   HCT 38.5* 38.8*    278      Other No results for input(s): LABA1C, LDLCALC, TRIG, TSH, NFIBVMRK44, FOLATE, LABSALI, COVID19 in the last 72 hours. DIAGNOSTICS   IMAGES:  No new imaging to review today     MRI brain w/o giuliana  1. Multiple foci of restricted diffusion, acute infarct in left middle cerebral artery territory in the left frontal, left parietal and temporal parietal lobe cortex.       2.  Underlying moderate to severe chronic small vessel disease, periventricular microangiopathic leukoencephalopathy. MRA of head / neck   Impression       1. Critical stenosis (greater than 90% NASCET criteria) of the left internal carotid artery origin related to densely calcified plaque when correlating with CTA appearance with additional contiguous noncalcified intraluminal filling defect suspicious for    thrombus in the proximal bulbar segment of the left internal carotid artery   2. No right carotid artery stenosis   3. No vertebral artery stenosis     Echo   Normal left ventricle size, wall thickness, and systolic function with an   estimated ejection fraction of 55-60%. No regional wall motion abnormalities   are seen. Diastolic filling parameters suggests normal diastolic function. IVC size is normal (<2.1cm) and collapses > 50% with respiration consistent   with normal RA pressure (3mmHg). Estimated pulmonary artery systolic   pressure is at 29 mmHg assuming a right atrial pressure of 3 mmHg. A bubble   study was performed and there are late bubbles suggestive of pulmonary AVMs   (no PFO).          EKG: NSR    PHYSICAL EXAMINATION     PHYSICAL EXAM:  Vitals:    06/29/22 0400 06/29/22 0500 06/29/22 0600 06/29/22 0700   BP: 127/66 117/77 134/66 129/80   Pulse: 50 (!) 49 50 (!) 48   Resp: 12 15 15 15   Temp: 97.9 °F (36.6 °C)   97.2 °F (36.2 °C)   TempSrc: Oral   Oral   SpO2: 98%  97% 99%   Weight:       Height:          PHYSICAL EXAM:  Vitals:    06/29/22 0400 06/29/22 0500 06/29/22 0600 06/29/22 0700   BP: 127/66 117/77 134/66 129/80   Pulse: 50 (!) 49 50 (!) 48   Resp: 12 15 15 15   Temp: 97.9 °F (36.6 °C)   97.2 °F (36.2 °C)   TempSrc: Oral   Oral   SpO2: 98%  97% 99%   Weight:       Height:             General: Alert, no distress, well-nourished  Neurologic  Mental status:   orientation to person, place, time, situation   Attention intact as able to attend well to the exam     Language fluent in conversation   Comprehension intact; follows simple Telemetry while inpatient  - PT/OT/SLP  - Will need stroke education at discharge  - Will need to follow up with Neurology after discharge    Jorja Severance, APRN - [id number] [facility]   Neurology & Neurocritical Care   Neurology Line: [phone]  Perfect[employer]: [facility] Neurology & Neuro Critical Care NPs  6/29/2022 9:10 AM

## 2022-06-29 NOTE — PLAN OF CARE
Problem: Discharge Planning  Goal: Discharge to home or other facility with appropriate resources  6/29/2022 0923 by Mohit Church RN  Outcome: Progressing  Flowsheets (Taken 6/29/2022 1022)  Discharge to home or other facility with appropriate resources:   Identify barriers to discharge with patient and caregiver   Arrange for needed discharge resources and transportation as appropriate   Identify discharge learning needs (meds, wound care, etc)   Arrange for interpreters to assist at discharge as needed   Refer to discharge planning if patient needs post-hospital services based on physician order or complex needs related to functional status, cognitive ability or social support system  6/28/2022 2128 by Yony Kwan RN  Outcome: Progressing  Flowsheets (Taken 6/28/2022 2000)  Discharge to home or other facility with appropriate resources:   Identify barriers to discharge with patient and caregiver   Arrange for needed discharge resources and transportation as appropriate   Identify discharge learning needs (meds, wound care, etc)   Arrange for interpreters to assist at discharge as needed     Problem: Pain  Goal: Verbalizes/displays adequate comfort level or baseline comfort level  6/29/2022 0923 by Mohit Church RN  Outcome: Progressing  6/28/2022 2128 by Yony Kwan RN  Outcome: Progressing  Flowsheets (Taken 6/28/2022 2000)  Verbalizes/displays adequate comfort level or baseline comfort level:   Encourage patient to monitor pain and request assistance   Assess pain using appropriate pain scale   Administer analgesics based on type and severity of pain and evaluate response   Implement non-pharmacological measures as appropriate and evaluate response   Consider cultural and social influences on pain and pain management   Notify Licensed Independent Practitioner if interventions unsuccessful or patient reports new pain     Problem: Safety - Adult  Goal: Free from fall injury  6/29/2022 0923 by Bryce Espinoza Sean Conroy RN  Outcome: Progressing  6/28/2022 2128 by Radha Michelle RN  Outcome: Progressing  Flowsheets (Taken 6/28/2022 2000)  Free From Fall Injury: Instruct family/caregiver on patient safety     Problem: Chronic Conditions and Co-morbidities  Goal: Patient's chronic conditions and co-morbidity symptoms are monitored and maintained or improved  6/29/2022 0923 by Susie Gamboa RN  Outcome: Progressing  Flowsheets (Taken 6/29/2022 0749)  Care Plan - Patient's Chronic Conditions and Co-Morbidity Symptoms are Monitored and Maintained or Improved:   Monitor and assess patient's chronic conditions and comorbid symptoms for stability, deterioration, or improvement   Collaborate with multidisciplinary team to address chronic and comorbid conditions and prevent exacerbation or deterioration   Update acute care plan with appropriate goals if chronic or comorbid symptoms are exacerbated and prevent overall improvement and discharge  6/28/2022 2128 by Radha Michelle RN  Outcome: Progressing  Flowsheets (Taken 6/28/2022 2000)  Care Plan - Patient's Chronic Conditions and Co-Morbidity Symptoms are Monitored and Maintained or Improved:   Monitor and assess patient's chronic conditions and comorbid symptoms for stability, deterioration, or improvement   Collaborate with multidisciplinary team to address chronic and comorbid conditions and prevent exacerbation or deterioration     Problem: ABCDS Injury Assessment  Goal: Absence of physical injury  6/29/2022 0923 by Susie Gamboa RN  Outcome: Progressing  6/28/2022 2128 by Radha Michelle RN  Outcome: Progressing  Flowsheets (Taken 6/28/2022 2000)  Absence of Physical Injury: Implement safety measures based on patient assessment     Problem: Neurosensory - Adult  Goal: Achieves stable or improved neurological status  6/29/2022 0923 by Susie Gamboa RN  Outcome: Progressing  Flowsheets (Taken 6/29/2022 0749)  Achieves stable or improved neurological status:   Assess for and report changes in neurological status   Initiate measures to prevent increased intracranial pressure   Maintain blood pressure and fluid volume within ordered parameters to optimize cerebral perfusion and minimize risk of hemorrhage   Monitor temperature, glucose, and sodium. Initiate appropriate interventions as ordered  6/28/2022 2128 by Wilman Stewart RN  Outcome: Progressing  Flowsheets (Taken 6/28/2022 2000)  Achieves stable or improved neurological status:   Assess for and report changes in neurological status   Initiate measures to prevent increased intracranial pressure   Maintain blood pressure and fluid volume within ordered parameters to optimize cerebral perfusion and minimize risk of hemorrhage   Monitor temperature, glucose, and sodium. Initiate appropriate interventions as ordered  Goal: Absence of seizures  6/29/2022 0923 by Arnold Gonzáles RN  Outcome: Progressing  Flowsheets (Taken 6/29/2022 0749)  Absence of seizures:   Monitor for seizure activity.   If seizure occurs, document type and location of movements and any associated apnea   If seizure occurs, turn head to side and suction secretions as needed   Administer anticonvulsants as ordered   Support airway/breathing, administer oxygen as needed   Diagnostic studies as ordered  6/28/2022 2128 by Wilman Stewart RN  Outcome: Progressing  Goal: Remains free of injury related to seizures activity  6/29/2022 0923 by Arnold Gonzáles RN  Outcome: Progressing  Flowsheets (Taken 6/29/2022 2929)  Remains free of injury related to seizure activity:   Maintain airway, patient safety  and administer oxygen as ordered   Monitor patient for seizure activity, document and report duration and description of seizure to Licensed Independent Practitioner   If seizure occurs, turn patient to side and suction secretions as needed   Reorient patient post seizure  6/28/2022 2128 by Wilman Stewart RN  Outcome: Progressing  Goal: Achieves maximal functionality and self care  6/29/2022 6491 by Caroline Portillo RN  Outcome: Progressing  Flowsheets (Taken 6/29/2022 7785)  Achieves maximal functionality and self care:   Monitor swallowing and airway patency with patient fatigue and changes in neurological status   Encourage and assist patient to increase activity and self care with guidance from physical therapy/occupational therapy   Encourage visually impaired, hearing impaired and aphasic patients to use assistive/communication devices  6/28/2022 2128 by Nithya Zapata RN  Outcome: Progressing     Problem: Respiratory - Adult  Goal: Achieves optimal ventilation and oxygenation  6/29/2022 0923 by Caroline Portillo RN  Outcome: Progressing  Flowsheets (Taken 6/29/2022 0749)  Achieves optimal ventilation and oxygenation:   Assess for changes in respiratory status   Assess for changes in mentation and behavior   Position to facilitate oxygenation and minimize respiratory effort   Oxygen supplementation based on oxygen saturation or arterial blood gases   Initiate smoking cessation protocol as indicated   Encourage broncho-pulmonary hygiene including cough, deep breathe, incentive spirometry   Assess the need for suctioning and aspirate as needed   Assess and instruct to report shortness of breath or any respiratory difficulty   Respiratory therapy support as indicated  6/28/2022 2128 by Nithya Zapata RN  Outcome: Progressing  Flowsheets (Taken 6/28/2022 2000)  Achieves optimal ventilation and oxygenation:   Assess for changes in respiratory status   Assess for changes in mentation and behavior   Position to facilitate oxygenation and minimize respiratory effort   Oxygen supplementation based on oxygen saturation or arterial blood gases   Encourage broncho-pulmonary hygiene including cough, deep breathe, incentive spirometry     Problem: Cardiovascular - Adult  Goal: Maintains optimal cardiac output and hemodynamic stability  6/29/2022 0923 by Caroline Portillo RN  Outcome: Progressing  Flowsheets (Taken 6/29/2022 0749)  Maintains optimal cardiac output and hemodynamic stability:   Monitor blood pressure and heart rate   Monitor urine output and notify Licensed Independent Practitioner for values outside of normal range   Assess for signs of decreased cardiac output   Administer fluid and/or volume expanders as ordered   Administer vasoactive medications as ordered  6/28/2022 2128 by Garrison Vaca RN  Outcome: Progressing  Flowsheets (Taken 6/28/2022 2000)  Maintains optimal cardiac output and hemodynamic stability:   Monitor blood pressure and heart rate   Monitor urine output and notify Licensed Independent Practitioner for values outside of normal range   Assess for signs of decreased cardiac output   Administer fluid and/or volume expanders as ordered   Administer vasoactive medications as ordered  Goal: Absence of cardiac dysrhythmias or at baseline  6/29/2022 0923 by Dom Abdalla RN  Outcome: Progressing  Flowsheets (Taken 6/29/2022 0749)  Absence of cardiac dysrhythmias or at baseline:   Monitor cardiac rate and rhythm   Assess for signs of decreased cardiac output   Administer antiarrhythmia medication and electrolyte replacement as ordered  6/28/2022 2128 by Garrison Vaca RN  Outcome: Progressing  Flowsheets (Taken 6/28/2022 2000)  Absence of cardiac dysrhythmias or at baseline:   Monitor cardiac rate and rhythm   Assess for signs of decreased cardiac output   Administer antiarrhythmia medication and electrolyte replacement as ordered     Problem: Skin/Tissue Integrity - Adult  Goal: Skin integrity remains intact  6/29/2022 0923 by Dom Abdalla RN  Outcome: Progressing  Flowsheets (Taken 6/29/2022 0749)  Skin Integrity Remains Intact:   Monitor for areas of redness and/or skin breakdown   Assess vascular access sites hourly  6/28/2022 2128 by Garrison Vaca RN  Outcome: Progressing  Flowsheets (Taken 6/28/2022 2000)  Skin Integrity Remains Intact:   Monitor for areas of redness and/or skin breakdown Assess vascular access sites hourly   Every 4-6 hours minimum: Change oxygen saturation probe site  Goal: Incisions, wounds, or drain sites healing without S/S of infection  6/29/2022 0923 by Danielito Otoole RN  Outcome: Progressing  Flowsheets (Taken 6/29/2022 0749)  Incisions, Wounds, or Drain Sites Healing Without Sign and Symptoms of Infection: ADMISSION and DAILY: Assess and document risk factors for pressure ulcer development  6/28/2022 2128 by Lobo Salas RN  Outcome: Progressing  Goal: Oral mucous membranes remain intact  6/29/2022 0923 by Danielito Otoole RN  Outcome: Progressing  Flowsheets (Taken 6/29/2022 0749)  Oral Mucous Membranes Remain Intact:   Assess oral mucosa and hygiene practices   Implement preventative oral hygiene regimen   Implement oral medicated treatments as ordered  6/28/2022 2128 by Lobo Salas RN  Outcome: Progressing     Problem: Musculoskeletal - Adult  Goal: Return mobility to safest level of function  6/29/2022 0923 by Danielito Otoole RN  Outcome: Progressing  Flowsheets (Taken 6/29/2022 0749)  Return Mobility to Safest Level of Function:   Assess patient stability and activity tolerance for standing, transferring and ambulating with or without assistive devices   Assist with transfers and ambulation using safe patient handling equipment as needed   Ensure adequate protection for wounds/incisions during mobilization   Obtain physical therapy/occupational therapy consults as needed   Apply continuous passive motion per provider or physical therapy orders to increase flexion toward goal   Instruct patient/family in ordered activity level  6/28/2022 2128 by Lobo Salas RN  Outcome: Progressing  Flowsheets (Taken 6/28/2022 2000)  Return Mobility to Safest Level of Function:   Assess patient stability and activity tolerance for standing, transferring and ambulating with or without assistive devices   Ensure adequate protection for wounds/incisions during mobilization  Goal: Maintain proper alignment of affected body part  6/29/2022 0923 by Kylie Altman RN  Outcome: Progressing  Flowsheets (Taken 6/29/2022 8892)  Maintain proper alignment of affected body part:   Support and protect limb and body alignment per provider's orders   Instruct and reinforce with patient and family use of appropriate assistive device and precautions (e.g. spinal or hip dislocation precautions)  6/28/2022 2128 by Julio Sparks RN  Outcome: Progressing  Goal: Return ADL status to a safe level of function  6/29/2022 0923 by Kylie Altman RN  Outcome: Progressing  Flowsheets (Taken 6/29/2022 0749)  Return ADL Status to a Safe Level of Function:   Administer medication as ordered   Assess activities of daily living deficits and provide assistive devices as needed   Obtain physical therapy/occupational therapy consults as needed   Assist and instruct patient to increase activity and self care as tolerated  6/28/2022 2128 by Julio Sparks RN  Outcome: Progressing     Problem: Gastrointestinal - Adult  Goal: Minimal or absence of nausea and vomiting  6/29/2022 0923 by Kylie Altman RN  Outcome: Progressing  Flowsheets (Taken 6/29/2022 0749)  Minimal or absence of nausea and vomiting:   Administer IV fluids as ordered to ensure adequate hydration   Maintain NPO status until nausea and vomiting are resolved   Nasogastric tube to low intermittent suction as ordered   Administer ordered antiemetic medications as needed   Provide nonpharmacologic comfort measures as appropriate   Advance diet as tolerated, if ordered   Nutrition consult to assist patient with adequate nutrition and appropriate food choices  6/28/2022 2128 by Julio Sparks RN  Outcome: Progressing  Flowsheets (Taken 6/28/2022 2000)  Minimal or absence of nausea and vomiting: Provide nonpharmacologic comfort measures as appropriate  Goal: Maintains or returns to baseline bowel function  6/29/2022 0923 by Kylie Altman RN  Outcome: Progressing  Flowsheets (Taken 6/29/2022 0749)  Maintains or returns to baseline bowel function:   Assess bowel function   Encourage oral fluids to ensure adequate hydration   Administer IV fluids as ordered to ensure adequate hydration   Administer ordered medications as needed   Encourage mobilization and activity   Nutrition consult to assist patient with appropriate food choices  6/28/2022 2128 by Angelica Remy RN  Outcome: Progressing  Flowsheets (Taken 6/28/2022 2000)  Maintains or returns to baseline bowel function:   Assess bowel function   Encourage oral fluids to ensure adequate hydration   Administer ordered medications as needed   Encourage mobilization and activity  Goal: Maintains adequate nutritional intake  6/29/2022 0923 by Farhan Bernstein RN  Outcome: Progressing  Flowsheets (Taken 6/29/2022 0749)  Maintains adequate nutritional intake:   Monitor percentage of each meal consumed   Identify factors contributing to decreased intake, treat as appropriate   Assist with meals as needed   Monitor intake and output, weight and lab values   Obtain nutritional consult as needed  6/28/2022 2128 by Angelica eRmy RN  Outcome: Progressing  Goal: Establish and maintain optimal ostomy function  6/29/2022 0923 by Farhan Bernstein RN  Outcome: Progressing  Flowsheets (Taken 6/29/2022 0749)  Establish and maintain optimal ostomy function:   Monitor output from ostomies   Administer IV fluids and TPN as ordered   Introduce and advance enteral feedings as ordered   Nutrition consult  6/28/2022 2128 by Angelica Rmey RN  Outcome: Progressing     Problem: Genitourinary - Adult  Goal: Absence of urinary retention  6/29/2022 0923 by Farhan Bernstein RN  Outcome: Progressing  6/28/2022 2128 by Angelica Remy RN  Outcome: Progressing     Problem: Infection - Adult  Goal: Absence of infection at discharge  6/29/2022 0923 by Farhan Bernstein RN  Outcome: Progressing  Flowsheets (Taken 6/29/2022 0749)  Absence of infection at discharge:   Assess and monitor maintained within normal limits:   Monitor labs and assess patient for signs and symptoms of electrolyte imbalances   Administer electrolyte replacement as ordered   Monitor response to electrolyte replacements, including repeat lab results as appropriate   Fluid restriction as ordered   Instruct patient on fluid and nutrition restrictions as appropriate  6/28/2022 2128 by Guzman Cuellar RN  Outcome: Progressing  Flowsheets (Taken 6/28/2022 2000)  Electrolytes maintained within normal limits:   Monitor labs and assess patient for signs and symptoms of electrolyte imbalances   Administer electrolyte replacement as ordered   Monitor response to electrolyte replacements, including repeat lab results as appropriate   Fluid restriction as ordered  Goal: Hemodynamic stability and optimal renal function maintained  6/29/2022 0923 by Cosme Sinclair RN  Outcome: Progressing  Flowsheets (Taken 6/29/2022 0726)  Hemodynamic stability and optimal renal function maintained:   Monitor labs and assess for signs and symptoms of volume excess or deficit   Monitor intake, output and patient weight   Monitor urine specific gravity, serum osmolarity and serum sodium as indicated or ordered   Monitor response to interventions for patient's volume status, including labs, urine output, blood pressure (other measures as available)   Encourage oral intake as appropriate   Instruct patient on fluid and nutrition restrictions as appropriate  6/28/2022 2128 by Guzman Cuellar RN  Outcome: Progressing  Flowsheets (Taken 6/28/2022 2000)  Hemodynamic stability and optimal renal function maintained:   Monitor labs and assess for signs and symptoms of volume excess or deficit   Monitor intake, output and patient weight   Monitor urine specific gravity, serum osmolarity and serum sodium as indicated or ordered   Monitor response to interventions for patient's volume status, including labs, urine output, blood pressure (other measures as available) Encourage oral intake as appropriate  Goal: Glucose maintained within prescribed range  6/29/2022 0923 by Farhan Bernstein RN  Outcome: Progressing  Flowsheets (Taken 6/29/2022 0749)  Glucose maintained within prescribed range:   Monitor blood glucose as ordered   Assess for signs and symptoms of hyperglycemia and hypoglycemia   Administer ordered medications to maintain glucose within target range   Assess barriers to adequate nutritional intake and initiate nutrition consult as needed   Instruct patient on self management of diabetes and initiate consult as needed  6/28/2022 2128 by Angelica Remy, RN  Outcome: Progressing  Flowsheets (Taken 6/28/2022 2000)  Glucose maintained within prescribed range:   Monitor blood glucose as ordered   Assess for signs and symptoms of hyperglycemia and hypoglycemia   Administer ordered medications to maintain glucose within target range   Assess barriers to adequate nutritional intake and initiate nutrition consult as needed     Problem: Hematologic - Adult  Goal: Maintains hematologic stability  6/29/2022 0923 by Farhan Bernstein RN  Outcome: Progressing  Flowsheets (Taken 6/29/2022 0749)  Maintains hematologic stability:   Assess for signs and symptoms of bleeding or hemorrhage   Monitor labs for bleeding or clotting disorders   Administer blood products/factors as ordered  6/28/2022 2128 by Angelica Remy, RN  Outcome: Progressing  Flowsheets (Taken 6/28/2022 2000)  Maintains hematologic stability:   Assess for signs and symptoms of bleeding or hemorrhage   Monitor labs for bleeding or clotting disorders

## 2022-06-29 NOTE — CARE COORDINATION
Patient remains heparin gtt. Medical team expects patient to go home at discharge. CM will continue to follow patient until discharge.   Electronically signed by Calin Driver RN on 6/29/2022 at 2:45 PM

## 2022-06-29 NOTE — PROGRESS NOTES
Progress Note  Admit Date: 6/25/2022             75 y.o. male with PMH as below notable for HTN, alcohol abuse, tobacco use who presented with dysarthria. Time of onset was reported at 12:20 PM. Patient report he was working when he started having slurred speech, patient reports he started also feeling numbness in his R arm. Patient decided to consult. Patient reports his symptoms lasted for approximately 3 hours. Patient denies current alcohol use. He states he smokes 50 packs/year history, currently smokes 1 pack. Lives at home with his family.      The patient denies abdominal or flank pain, anorexia, nausea or vomiting, dysphagia, change in bowel habits or black or bloody stools or weight loss. Patient denies any exertional chest pain, dyspnea, palpitations, syncope, orthopnea, edema or paroxysmal nocturnal dyspnea. The patient denies dysuria, frequency or hematuria. He denies constitutional symptoms of fatigue, weakness, weight loss or gain, fevers, night sweats.      On admission patient was hemodynamically stable and afebrile. Patient was AOx4 with dysarthria, no aphasia, R arm weakness with positive drift, also R sided facial droop. NIH 4. WBC 12. EKG did not show any acute ischemic changes. Trop neg. CXR did not show any acute cardiopulmonary abnormality. CT head did not show any acute intracranial abnormality. CTA head showed critical stenosis at the origin of the left internal carotid artery. Thrombus projecting into the lumen at the origin of the left internal carotid artery, at risk for contributing to distal emboli.  stroke team was consulted. Patient not candidate for tPA due to symptoms outside window.  NSGY was consulted recommended heparin drip and MR based non invasive vascular imaging to confirm presence of intraluminal thrombus within the cervical left ICA origin.      Patient was admitted to the ICU for further workup and management       Interval History: No new issues  Hs been on heparin gtt    No chest pain  No dyspnea    Awake, alert      Review of Systems - Negative except as in HPI  All other systems reviewed and are negative. .     Scheduled Medications:    pantoprazole  40 mg Oral QAM AC    rosuvastatin  40 mg Oral Nightly      PRN Medications: ondansetron **OR** ondansetron, polyethylene glycol, perflutren lipid microspheres  Diet: ADULT DIET; Regular; 4 carb choices (60 gm/meal)    Continuous Infusions:   heparin (PORCINE) Infusion 18 Units/kg/hr (06/29/22 1312)       PHYSICAL EXAM:  /67   Pulse 53   Temp 97.8 °F (36.6 °C)   Resp 20   Ht 5' 9\" (1.753 m)   Wt 138 lb 10.7 oz (62.9 kg)   SpO2 98%   BMI 20.48 kg/m²   No results for input(s): POCGLU in the last 72 hours.     Intake/Output Summary (Last 24 hours) at 6/29/2022 1439  Last data filed at 6/29/2022 0930  Gross per 24 hour   Intake 929.52 ml   Output --   Net 929.52 ml     General appearance: alert, appears stated age and cooperative  Head: Normocephalic, without obvious abnormality, atraumatic  Neck: no adenopathy, no carotid bruit, no JVD, supple, symmetrical, trachea midline and thyroid not enlarged, symmetric, no tenderness/mass/nodules  Lungs: clear to auscultation bilaterally  Chest wall: no tenderness  Heart: regular rate and rhythm, S1, S2 normal, no murmur, click, rub or gallop  Abdomen: soft, non-tender; bowel sounds normal; no masses,  no organomegaly  Extremities: extremities normal, atraumatic, no cyanosis or edema  Pulses: 2+ and symmetric  Neurologic: Grossly normal    LABS:  Recent Labs     06/27/22  0537 06/28/22  0547   WBC 11.1* 10.4   HGB 12.7* 12.6*   HCT 38.5* 38.8*    278                                                                    Recent Labs     06/27/22  0537 06/28/22  0547 06/29/22  0532    137 133*   K 3.9 3.9 5.6*    102 103   CO2 24 24 18*   BUN 13 12 15   CREATININE 0.8 0.9 0.8   GLUCOSE 77 131* 82     No results for input(s): AST, ALT, ALB, BILITOT, ALKPHOS in the last 72 hours. No results for input(s): TROPONINI in the last 72 hours. No results for input(s): BNP in the last 72 hours. No results for input(s): CHOL, HDL in the last 72 hours. Invalid input(s): LDLCALCU  No results for input(s): INR in the last 72 hours. Assessment & Plan:    75 y.o. male with HLD, Incomplete RBB with GERD admitted with Right facial weakness with right upper extremity weakness and numbness      Acute CVA left MCA territory: POA  Critical cervical R ICA stenosis with probable  intraluminal thrombus: POA  - Appears thromboembolic CVA from LIZ stenosis, withmultiple risk factors for atherosclerotic CVD: tobacco abuse, HTN, \"Prediabetes\" and hyperlipidemia  - Pxt started on heparin gtt  - Keep HOB >30 degrees  - High intensity Statin  - Has allergy to ASA so placed on Plavix   - Echo w/ bubble : \"EFf 55-60%. No RWMA;  A bubble study was performed and there are late bubbles suggestive of pulmonary AVMs. (no PFO). - Lipid Panel: LDL: 79, Hb A1c: 5.6  - Neurology follow up  - NSGY following for thrombus in the proximal bulbar segment of the left internal carotid artery: On Low-dose, no-bolus systemic heparinization protocol. Repeat MR studies 6/29/2022  - Counseling re tobacco abuse         GERD (gastroesophageal reflux disease)     Primary hypertension: Monitor BPs     Incomplete RBBB. Echo, telemetry     Alcohol abuse: Monitor for withdrawal.      Prediabetes: Update A1C     History of drug abuse in remission      Chronic hepatitis C virus genotype 1 infection: O/p F/u      The patient and / or the family were informed of the results of any tests, a time was given to answer questions, a plan was proposed and they agreed with plan. Full Code    Disposition: Has been neurol stable  Transfer from ICU  Planned 3-5 days heparin gtt (started 6/25/22). For repeat imaging today to determine if will need further intervention:   PT/OT: noted. Should likely be able to go home at discharge. Brennan Vieira MD

## 2022-06-29 NOTE — DISCHARGE SUMMARY
Lipid Panel: LDL: 79, Hb A1c: 5.6  - Thrombus in the proximal bulbar segment of the left internal carotid artery:- Low-dose, no-bolus systemic heparinization protocol. - Repeat MR-based noninvasive vascular imaging studies reviewed by Dr. Edelmira Brothers and he recommends transitioning to NOAC and following up in 3 weeks with repeat CTA Head/Neck with possible carotid artery stent in the future. - Counseling re tobacco abuse           GERD (gastroesophageal reflux disease)     Primary hypertension: Monitor BPs     Incomplete RBBB. Echo, telemetry     Alcohol abuse: Monitor for withdrawal.      Prediabetes: Update A1C     History of drug abuse in remission      Chronic hepatitis C virus genotype 1 infection: O/p F/u        Physical Exam: /67   Pulse 53   Temp 97.8 °F (36.6 °C)   Resp 20   Ht 5' 9\" (1.753 m)   Wt 138 lb 10.7 oz (62.9 kg)   SpO2 98%   BMI 20.48 kg/m²     No results for input(s): POCGLU in the last 72 hours. General appearance: alert, appears stated age and cooperative  Head: Normocephalic, without obvious abnormality, atraumatic  Neck: no adenopathy, no carotid bruit, no JVD, supple, symmetrical, trachea midline and thyroid not enlarged, symmetric, no tenderness/mass/nodules  Lungs: clear to auscultation bilaterally  Chest wall: no tenderness  Heart: regular rate and rhythm, S1, S2 normal, no murmur, click, rub or gallop  Abdomen: soft, non-tender; bowel sounds normal; no masses,  no organomegaly  Extremities: extremities normal, atraumatic, no cyanosis or edema  Pulses: 2+ and symmetric  Neurologic: Grossly normal    LABS:  Recent Labs     06/28/22  0547   WBC 10.4   HGB 12.6*         Recent Labs     06/29/22  0532   *   K 5.6*      CO2 18*   BUN 15   CREATININE 0.8   GLUCOSE 82     No results for input(s): INR in the last 72 hours.         Discharge Medications:  Current Discharge Medication List           Details   rosuvastatin (CRESTOR) 40 MG tablet Take 1 tablet by mouth

## 2022-06-29 NOTE — PLAN OF CARE
Independent Practitioner if interventions unsuccessful or patient reports new pain  Taken 6/29/2022 1000 by Delilah Dao RN  Verbalizes/displays adequate comfort level or baseline comfort level:   Encourage patient to monitor pain and request assistance   Assess pain using appropriate pain scale   Administer analgesics based on type and severity of pain and evaluate response   Implement non-pharmacological measures as appropriate and evaluate response   Consider cultural and social influences on pain and pain management   Notify Licensed Independent Practitioner if interventions unsuccessful or patient reports new pain  6/29/2022 0923 by Delilah Dao RN  Outcome: Progressing     Problem: Safety - Adult  Goal: Free from fall injury  6/29/2022 1651 by Sean Haywood RN  Outcome: Progressing  Flowsheets (Taken 6/29/2022 1600)  Free From Fall Injury:   Instruct family/caregiver on patient safety   Based on caregiver fall risk screen, instruct family/caregiver to ask for assistance with transferring infant if caregiver noted to have fall risk factors  6/29/2022 0923 by Delilah Dao RN  Outcome: Progressing  Flowsheets (Taken 6/29/2022 7256)  Free From Fall Injury: Instruct family/caregiver on patient safety     Problem: Chronic Conditions and Co-morbidities  Goal: Patient's chronic conditions and co-morbidity symptoms are monitored and maintained or improved  6/29/2022 1651 by Sean Haywood RN  Outcome: Progressing  Flowsheets (Taken 6/29/2022 1600)  Care Plan - Patient's Chronic Conditions and Co-Morbidity Symptoms are Monitored and Maintained or Improved:   Collaborate with multidisciplinary team to address chronic and comorbid conditions and prevent exacerbation or deterioration   Monitor and assess patient's chronic conditions and comorbid symptoms for stability, deterioration, or improvement  6/29/2022 0923 by Delilah Dao RN  Outcome: Progressing  Flowsheets (Taken 6/29/2022 0749)  Care Plan - Patient's Chronic Conditions and Co-Morbidity Symptoms are Monitored and Maintained or Improved:   Monitor and assess patient's chronic conditions and comorbid symptoms for stability, deterioration, or improvement   Collaborate with multidisciplinary team to address chronic and comorbid conditions and prevent exacerbation or deterioration   Update acute care plan with appropriate goals if chronic or comorbid symptoms are exacerbated and prevent overall improvement and discharge     Problem: ABCDS Injury Assessment  Goal: Absence of physical injury  6/29/2022 1651 by Vidya Fuller RN  Outcome: Progressing  Flowsheets (Taken 6/29/2022 1600)  Absence of Physical Injury: Implement safety measures based on patient assessment  6/29/2022 0923 by Jose Elias Lopez RN  Outcome: Progressing  Flowsheets (Taken 6/29/2022 5281)  Absence of Physical Injury: Implement safety measures based on patient assessment     Problem: Neurosensory - Adult  Goal: Achieves stable or improved neurological status  6/29/2022 1651 by Vidya Fuller RN  Outcome: Progressing  Flowsheets (Taken 6/29/2022 1600)  Achieves stable or improved neurological status:   Assess for and report changes in neurological status   Initiate measures to prevent increased intracranial pressure   Maintain blood pressure and fluid volume within ordered parameters to optimize cerebral perfusion and minimize risk of hemorrhage   Monitor temperature, glucose, and sodium. Initiate appropriate interventions as ordered  6/29/2022 5055 by Jose Elias Lopez RN  Outcome: Progressing  Flowsheets (Taken 6/29/2022 6509)  Achieves stable or improved neurological status:   Assess for and report changes in neurological status   Initiate measures to prevent increased intracranial pressure   Maintain blood pressure and fluid volume within ordered parameters to optimize cerebral perfusion and minimize risk of hemorrhage   Monitor temperature, glucose, and sodium. Initiate appropriate interventions as ordered  Goal: Absence of seizures  6/29/2022 1651 by Andrew Golden RN  Outcome: Progressing  Flowsheets (Taken 6/29/2022 1600)  Absence of seizures:   Monitor for seizure activity. If seizure occurs, document type and location of movements and any associated apnea   If seizure occurs, turn head to side and suction secretions as needed   Administer anticonvulsants as ordered   Support airway/breathing, administer oxygen as needed   Diagnostic studies as ordered  6/29/2022 0923 by Zakiya Elam RN  Outcome: Progressing  Flowsheets (Taken 6/29/2022 0749)  Absence of seizures:   Monitor for seizure activity.   If seizure occurs, document type and location of movements and any associated apnea   If seizure occurs, turn head to side and suction secretions as needed   Administer anticonvulsants as ordered   Support airway/breathing, administer oxygen as needed   Diagnostic studies as ordered  Goal: Remains free of injury related to seizures activity  6/29/2022 1651 by Andrew Golden RN  Outcome: Progressing  Flowsheets (Taken 6/29/2022 1600)  Remains free of injury related to seizure activity:   Maintain airway, patient safety  and administer oxygen as ordered   Monitor patient for seizure activity, document and report duration and description of seizure to Licensed Independent Practitioner   If seizure occurs, turn patient to side and suction secretions as needed   Reorient patient post seizure   Seizure pads on all 4 side rails   Instruct patient/family to notify RN of any seizure activity   Instruct patient/family to call for assistance with activity based on assessment  6/29/2022 0923 by Zakiya Elam RN  Outcome: Progressing  Flowsheets (Taken 6/29/2022 0749)  Remains free of injury related to seizure activity:   Maintain airway, patient safety  and administer oxygen as ordered   Monitor patient for seizure activity, document and report duration and description of seizure to Licensed Independent Practitioner   If seizure occurs, turn patient to side and suction secretions as needed   Reorient patient post seizure  Goal: Achieves maximal functionality and self care  6/29/2022 1651 by Nina Torrez RN  Outcome: Progressing  Flowsheets (Taken 6/29/2022 1600)  Achieves maximal functionality and self care:   Monitor swallowing and airway patency with patient fatigue and changes in neurological status   Encourage and assist patient to increase activity and self care with guidance from physical therapy/occupational therapy   Encourage visually impaired, hearing impaired and aphasic patients to use assistive/communication devices  6/29/2022 0923 by Mihai Smith RN  Outcome: Progressing  Flowsheets (Taken 6/29/2022 0749)  Achieves maximal functionality and self care:   Monitor swallowing and airway patency with patient fatigue and changes in neurological status   Encourage and assist patient to increase activity and self care with guidance from physical therapy/occupational therapy   Encourage visually impaired, hearing impaired and aphasic patients to use assistive/communication devices     Problem: Neurosensory - Adult  Goal: Absence of seizures  6/29/2022 1651 by Nina Torrez RN  Outcome: Progressing  Flowsheets (Taken 6/29/2022 1600)  Absence of seizures:   Monitor for seizure activity. If seizure occurs, document type and location of movements and any associated apnea   If seizure occurs, turn head to side and suction secretions as needed   Administer anticonvulsants as ordered   Support airway/breathing, administer oxygen as needed   Diagnostic studies as ordered  6/29/2022 0923 by Mihai Smith RN  Outcome: Progressing  Flowsheets (Taken 6/29/2022 0749)  Absence of seizures:   Monitor for seizure activity.   If seizure occurs, document type and location of movements and any associated apnea   If seizure occurs, turn head to side and suction secretions as needed   Administer anticonvulsants as ordered   Support airway/breathing, administer oxygen as needed   Diagnostic studies as ordered     Problem: Neurosensory - Adult  Goal: Remains free of injury related to seizures activity  6/29/2022 1651 by Nito Jackson RN  Outcome: Progressing  Flowsheets (Taken 6/29/2022 1600)  Remains free of injury related to seizure activity:   Maintain airway, patient safety  and administer oxygen as ordered   Monitor patient for seizure activity, document and report duration and description of seizure to Licensed Independent Practitioner   If seizure occurs, turn patient to side and suction secretions as needed   Reorient patient post seizure   Seizure pads on all 4 side rails   Instruct patient/family to notify RN of any seizure activity   Instruct patient/family to call for assistance with activity based on assessment  6/29/2022 0923 by Forest Pearson RN  Outcome: Progressing  Flowsheets (Taken 6/29/2022 0749)  Remains free of injury related to seizure activity:   Maintain airway, patient safety  and administer oxygen as ordered   Monitor patient for seizure activity, document and report duration and description of seizure to Licensed Independent Practitioner   If seizure occurs, turn patient to side and suction secretions as needed   Reorient patient post seizure     Problem: Neurosensory - Adult  Goal: Achieves maximal functionality and self care  6/29/2022 1651 by Nito Jackson RN  Outcome: Progressing  Flowsheets (Taken 6/29/2022 1600)  Achieves maximal functionality and self care:   Monitor swallowing and airway patency with patient fatigue and changes in neurological status   Encourage and assist patient to increase activity and self care with guidance from physical therapy/occupational therapy   Encourage visually impaired, hearing impaired and aphasic patients to use assistive/communication devices  6/29/2022 0923 by Forest Pearson RN  Outcome: Progressing  Flowsheets (Taken 6/29/2022 0749)  Achieves maximal functionality and self care:   Monitor swallowing and airway patency with patient fatigue and changes in neurological status   Encourage and assist patient to increase activity and self care with guidance from physical therapy/occupational therapy   Encourage visually impaired, hearing impaired and aphasic patients to use assistive/communication devices     Problem: Respiratory - Adult  Goal: Achieves optimal ventilation and oxygenation  6/29/2022 1651 by Erick Oconnor RN  Outcome: Progressing  Flowsheets (Taken 6/29/2022 1600)  Achieves optimal ventilation and oxygenation:   Assess for changes in respiratory status   Assess for changes in mentation and behavior   Position to facilitate oxygenation and minimize respiratory effort   Oxygen supplementation based on oxygen saturation or arterial blood gases   Initiate smoking cessation protocol as indicated   Encourage broncho-pulmonary hygiene including cough, deep breathe, incentive spirometry   Assess the need for suctioning and aspirate as needed   Assess and instruct to report shortness of breath or any respiratory difficulty   Respiratory therapy support as indicated  6/29/2022 0923 by Caroline Portillo RN  Outcome: Progressing  Flowsheets (Taken 6/29/2022 0749)  Achieves optimal ventilation and oxygenation:   Assess for changes in respiratory status   Assess for changes in mentation and behavior   Position to facilitate oxygenation and minimize respiratory effort   Oxygen supplementation based on oxygen saturation or arterial blood gases   Initiate smoking cessation protocol as indicated   Encourage broncho-pulmonary hygiene including cough, deep breathe, incentive spirometry   Assess the need for suctioning and aspirate as needed   Assess and instruct to report shortness of breath or any respiratory difficulty   Respiratory therapy support as indicated     Problem: Neurosensory - Adult  Goal: Achieves maximal functionality and self care  6/29/2022 1651 by Salvador Morel RN  Outcome: Progressing  Flowsheets (Taken 6/29/2022 1600)  Achieves maximal functionality and self care:   Monitor swallowing and airway patency with patient fatigue and changes in neurological status   Encourage and assist patient to increase activity and self care with guidance from physical therapy/occupational therapy   Encourage visually impaired, hearing impaired and aphasic patients to use assistive/communication devices  6/29/2022 0923 by Kylie Altman RN  Outcome: Progressing  Flowsheets (Taken 6/29/2022 0749)  Achieves maximal functionality and self care:   Monitor swallowing and airway patency with patient fatigue and changes in neurological status   Encourage and assist patient to increase activity and self care with guidance from physical therapy/occupational therapy   Encourage visually impaired, hearing impaired and aphasic patients to use assistive/communication devices     Problem: Cardiovascular - Adult  Goal: Maintains optimal cardiac output and hemodynamic stability  6/29/2022 1651 by Salvador Morel RN  Outcome: Progressing  Flowsheets (Taken 6/29/2022 1600)  Maintains optimal cardiac output and hemodynamic stability:   Monitor blood pressure and heart rate   Monitor urine output and notify Licensed Independent Practitioner for values outside of normal range   Assess for signs of decreased cardiac output   Administer fluid and/or volume expanders as ordered   Administer vasoactive medications as ordered  6/29/2022 0923 by Kylie Altman RN  Outcome: Progressing  Flowsheets (Taken 6/29/2022 0749)  Maintains optimal cardiac output and hemodynamic stability:   Monitor blood pressure and heart rate   Monitor urine output and notify Licensed Independent Practitioner for values outside of normal range   Assess for signs of decreased cardiac output   Administer fluid and/or volume expanders as ordered   Administer vasoactive medications as ordered  Goal: Absence of cardiac dysrhythmias or at baseline  6/29/2022 1651 by Mukesh Reyes RN  Outcome: Progressing  Flowsheets (Taken 6/29/2022 1600)  Absence of cardiac dysrhythmias or at baseline:   Monitor cardiac rate and rhythm   Assess for signs of decreased cardiac output   Administer antiarrhythmia medication and electrolyte replacement as ordered  6/29/2022 0923 by Farhan Bernstein RN  Outcome: Progressing  Flowsheets (Taken 6/29/2022 0749)  Absence of cardiac dysrhythmias or at baseline:   Monitor cardiac rate and rhythm   Assess for signs of decreased cardiac output   Administer antiarrhythmia medication and electrolyte replacement as ordered     Problem: Skin/Tissue Integrity - Adult  Goal: Skin integrity remains intact  6/29/2022 1651 by Mukesh Reyes RN  Outcome: Progressing  Flowsheets  Taken 6/29/2022 1600 by Mukesh Reyes RN  Skin Integrity Remains Intact:   Monitor for areas of redness and/or skin breakdown   Assess vascular access sites hourly   Every 4-6 hours minimum: Change oxygen saturation probe site  Taken 6/29/2022 0924 by Farhan Bernstein RN  Skin Integrity Remains Intact:   Monitor for areas of redness and/or skin breakdown   Assess vascular access sites hourly  6/29/2022 0923 by Farhan Bernstein RN  Outcome: Progressing  Flowsheets (Taken 6/29/2022 0749)  Skin Integrity Remains Intact:   Monitor for areas of redness and/or skin breakdown   Assess vascular access sites hourly  Goal: Incisions, wounds, or drain sites healing without S/S of infection  6/29/2022 1651 by Mukesh Reyes RN  Outcome: Progressing  Flowsheets  Taken 6/29/2022 1600 by Mukesh Reyes RN  Incisions, Wounds, or Drain Sites Healing Without Sign and Symptoms of Infection:   ADMISSION and DAILY: Assess and document risk factors for pressure ulcer development   TWICE DAILY: Assess and document skin integrity   TWICE DAILY: Assess and document dressing/incision, wound bed, drain sites and surrounding tissue   Implement wound care per orders   Initiate isolation precautions as appropriate   Initiate pressure ulcer prevention bundle as indicated  Taken 6/29/2022 0924 by Chanda Rolle RN  Incisions, Wounds, or Drain Sites Healing Without Sign and Symptoms of Infection: ADMISSION and DAILY: Assess and document risk factors for pressure ulcer development  6/29/2022 0923 by Chanda Rolle RN  Outcome: Progressing  Flowsheets (Taken 6/29/2022 0749)  Incisions, Wounds, or Drain Sites Healing Without Sign and Symptoms of Infection: ADMISSION and DAILY: Assess and document risk factors for pressure ulcer development  Goal: Oral mucous membranes remain intact  6/29/2022 1651 by Elian Archuleta RN  Outcome: Progressing  Flowsheets  Taken 6/29/2022 1600 by Elian Archuleta RN  Oral Mucous Membranes Remain Intact:   Assess oral mucosa and hygiene practices   Implement preventative oral hygiene regimen   Implement oral medicated treatments as ordered  Taken 6/29/2022 0924 by Chanda Rolle RN  Oral Mucous Membranes Remain Intact:   Assess oral mucosa and hygiene practices   Implement preventative oral hygiene regimen  6/29/2022 0923 by Chanda Rolle RN  Outcome: Progressing  Flowsheets (Taken 6/29/2022 0749)  Oral Mucous Membranes Remain Intact:   Assess oral mucosa and hygiene practices   Implement preventative oral hygiene regimen   Implement oral medicated treatments as ordered     Problem: Musculoskeletal - Adult  Goal: Return mobility to safest level of function  6/29/2022 1651 by Elian Archuleta RN  Outcome: Progressing  Flowsheets (Taken 6/29/2022 1600)  Return Mobility to Safest Level of Function:   Assess patient stability and activity tolerance for standing, transferring and ambulating with or without assistive devices   Assist with transfers and ambulation using safe patient handling equipment as needed   Ensure adequate protection for wounds/incisions during mobilization   Obtain physical therapy/occupational therapy consults as needed   Apply continuous passive motion per provider or physical therapy orders to increase flexion toward goal   Instruct patient/family in ordered activity level  6/29/2022 0923 by Forest Pearson RN  Outcome: Progressing  Flowsheets (Taken 6/29/2022 0749)  Return Mobility to Safest Level of Function:   Assess patient stability and activity tolerance for standing, transferring and ambulating with or without assistive devices   Assist with transfers and ambulation using safe patient handling equipment as needed   Ensure adequate protection for wounds/incisions during mobilization   Obtain physical therapy/occupational therapy consults as needed   Apply continuous passive motion per provider or physical therapy orders to increase flexion toward goal   Instruct patient/family in ordered activity level  Goal: Maintain proper alignment of affected body part  6/29/2022 1651 by Nito Jackson RN  Outcome: Progressing  Flowsheets (Taken 6/29/2022 1600)  Maintain proper alignment of affected body part:   Support and protect limb and body alignment per provider's orders   Instruct and reinforce with patient and family use of appropriate assistive device and precautions (e.g. spinal or hip dislocation precautions)  6/29/2022 0923 by Forest Pearson RN  Outcome: Progressing  Flowsheets (Taken 6/29/2022 0749)  Maintain proper alignment of affected body part:   Support and protect limb and body alignment per provider's orders   Instruct and reinforce with patient and family use of appropriate assistive device and precautions (e.g. spinal or hip dislocation precautions)  Goal: Return ADL status to a safe level of function  6/29/2022 1651 by Nito Jackson RN  Outcome: Progressing  Flowsheets (Taken 6/29/2022 1600)  Return ADL Status to a Safe Level of Function:   Administer medication as ordered   Assess activities of daily living deficits and provide assistive devices as needed   Obtain physical therapy/occupational therapy consults as needed   Assist and instruct patient to increase activity and self care as tolerated  6/29/2022 0923 by Elizabeth Oconnor RN  Outcome: Progressing  Flowsheets (Taken 6/29/2022 0749)  Return ADL Status to a Safe Level of Function:   Administer medication as ordered   Assess activities of daily living deficits and provide assistive devices as needed   Obtain physical therapy/occupational therapy consults as needed   Assist and instruct patient to increase activity and self care as tolerated     Problem: Gastrointestinal - Adult  Goal: Minimal or absence of nausea and vomiting  6/29/2022 1651 by Nicole Rizvi RN  Outcome: Progressing  Flowsheets (Taken 6/29/2022 1600)  Minimal or absence of nausea and vomiting:   Administer IV fluids as ordered to ensure adequate hydration   Maintain NPO status until nausea and vomiting are resolved   Nasogastric tube to low intermittent suction as ordered   Administer ordered antiemetic medications as needed   Provide nonpharmacologic comfort measures as appropriate   Advance diet as tolerated, if ordered   Nutrition consult to assist patient with adequate nutrition and appropriate food choices  6/29/2022 0923 by Elizabeth Oconnor RN  Outcome: Progressing  Flowsheets (Taken 6/29/2022 0749)  Minimal or absence of nausea and vomiting:   Administer IV fluids as ordered to ensure adequate hydration   Maintain NPO status until nausea and vomiting are resolved   Nasogastric tube to low intermittent suction as ordered   Administer ordered antiemetic medications as needed   Provide nonpharmacologic comfort measures as appropriate   Advance diet as tolerated, if ordered   Nutrition consult to assist patient with adequate nutrition and appropriate food choices  Goal: Maintains or returns to baseline bowel function  6/29/2022 1651 by Nicole Rizvi RN  Outcome: Progressing  Flowsheets (Taken 6/29/2022 1600)  Maintains or returns to baseline bowel function:   Assess bowel function   Encourage oral fluids to ensure adequate hydration   Administer IV fluids as ordered to ensure adequate hydration   Administer ordered medications as needed   Encourage mobilization and activity   Nutrition consult to assist patient with appropriate food choices  6/29/2022 0923 by Cosme Sinclair RN  Outcome: Progressing  Flowsheets (Taken 6/29/2022 0749)  Maintains or returns to baseline bowel function:   Assess bowel function   Encourage oral fluids to ensure adequate hydration   Administer IV fluids as ordered to ensure adequate hydration   Administer ordered medications as needed   Encourage mobilization and activity   Nutrition consult to assist patient with appropriate food choices  Goal: Maintains adequate nutritional intake  6/29/2022 1651 by Autumn Jimenez RN  Outcome: Progressing  Flowsheets (Taken 6/29/2022 1600)  Maintains adequate nutritional intake:   Monitor percentage of each meal consumed   Identify factors contributing to decreased intake, treat as appropriate   Assist with meals as needed   Monitor intake and output, weight and lab values   Obtain nutritional consult as needed  6/29/2022 0923 by Cosme Sinclair RN  Outcome: Progressing  Flowsheets (Taken 6/29/2022 0749)  Maintains adequate nutritional intake:   Monitor percentage of each meal consumed   Identify factors contributing to decreased intake, treat as appropriate   Assist with meals as needed   Monitor intake and output, weight and lab values   Obtain nutritional consult as needed  Goal: Establish and maintain optimal ostomy function  6/29/2022 1651 by Autumn Jimenez RN  Outcome: Progressing  6/29/2022 0923 by Cosme Sinclair RN  Outcome: Progressing  Flowsheets (Taken 6/29/2022 0749)  Establish and maintain optimal ostomy function:   Monitor output from ostomies   Administer IV fluids and TPN as ordered   Introduce and advance enteral feedings as ordered   Nutrition consult     Problem: Genitourinary - Adult  Goal: Absence of urinary retention  6/29/2022 1651 by Madison John RN  Outcome: Progressing  6/29/2022 0923 by Ana M Trejo RN  Outcome: Progressing     Problem: Infection - Adult  Goal: Absence of infection at discharge  6/29/2022 1651 by Madison John RN  Outcome: Progressing  Flowsheets (Taken 6/29/2022 1600)  Absence of infection at discharge:   Assess and monitor for signs and symptoms of infection   Monitor lab/diagnostic results   Monitor all insertion sites i.e., indwelling lines, tubes and drains   [place] appropriate cooling/warming therapies per order   Administer medications as ordered   Instruct and encourage patient and family to use good hand hygiene technique   Identify and instruct in appropriate isolation precautions for identified infection/condition  6/29/2022 0923 by Ana M Trejo RN  Outcome: Progressing  Flowsheets (Taken 6/29/2022 0749)  Absence of infection at discharge:   Assess and monitor for signs and symptoms of infection   Monitor lab/diagnostic results   Monitor all insertion sites i.e., indwelling lines, tubes and drains   Monitor endotracheal (as able) and nasal secretions for changes in amount and color   [place] appropriate cooling/warming therapies per order   Administer medications as ordered   Instruct and encourage patient and family to use good hand hygiene technique   Identify and instruct in appropriate isolation precautions for identified infection/condition  Goal: Absence of infection during hospitalization  6/29/2022 1651 by Madison John RN  Outcome: Progressing  Flowsheets (Taken 6/29/2022 1600)  Absence of infection during hospitalization:   Assess and monitor for signs and symptoms of infection   Monitor lab/diagnostic results   Monitor all insertion sites i.e., indwelling lines, tubes and drains   Monitor 1651 by Elian Archuleta RN  Outcome: Progressing  Flowsheets (Taken 6/29/2022 1600)  Hemodynamic stability and optimal renal function maintained:   Monitor labs and assess for signs and symptoms of volume excess or deficit   Monitor intake, output and patient weight   Monitor urine specific gravity, serum osmolarity and serum sodium as indicated or ordered   Monitor response to interventions for patient's volume status, including labs, urine output, blood pressure (other measures as available)   Encourage oral intake as appropriate   Instruct patient on fluid and nutrition restrictions as appropriate  6/29/2022 0923 by Chanda Rolle RN  Outcome: Progressing  Flowsheets (Taken 6/29/2022 8302)  Hemodynamic stability and optimal renal function maintained:   Monitor labs and assess for signs and symptoms of volume excess or deficit   Monitor intake, output and patient weight   Monitor urine specific gravity, serum osmolarity and serum sodium as indicated or ordered   Monitor response to interventions for patient's volume status, including labs, urine output, blood pressure (other measures as available)   Encourage oral intake as appropriate   Instruct patient on fluid and nutrition restrictions as appropriate  Goal: Glucose maintained within prescribed range  6/29/2022 1651 by Elian Archuleta RN  Outcome: Progressing  Flowsheets (Taken 6/29/2022 1600)  Glucose maintained within prescribed range:   Monitor blood glucose as ordered   Assess for signs and symptoms of hyperglycemia and hypoglycemia   Assess barriers to adequate nutritional intake and initiate nutrition consult as needed   Administer ordered medications to maintain glucose within target range   Instruct patient on self management of diabetes and initiate consult as needed  6/29/2022 0923 by Chanda Rolle RN  Outcome: Progressing  Flowsheets (Taken 6/29/2022 0749)  Glucose maintained within prescribed range:   Monitor blood glucose as ordered   Assess for signs and symptoms of hyperglycemia and hypoglycemia   Administer ordered medications to maintain glucose within target range   Assess barriers to adequate nutritional intake and initiate nutrition consult as needed   Instruct patient on self management of diabetes and initiate consult as needed     Problem: Hematologic - Adult  Goal: Maintains hematologic stability  6/29/2022 1651 by Erick Oconnor RN  Outcome: Progressing  Flowsheets (Taken 6/29/2022 1600)  Maintains hematologic stability:   Monitor labs for bleeding or clotting disorders   Assess for signs and symptoms of bleeding or hemorrhage   Administer blood products/factors as ordered  6/29/2022 0923 by Caroline Portillo RN  Outcome: Progressing  Flowsheets (Taken 6/29/2022 0749)  Maintains hematologic stability:   Assess for signs and symptoms of bleeding or hemorrhage   Monitor labs for bleeding or clotting disorders   Administer blood products/factors as ordered

## 2022-06-30 VITALS
OXYGEN SATURATION: 95 % | WEIGHT: 132.72 LBS | TEMPERATURE: 98 F | RESPIRATION RATE: 21 BRPM | SYSTOLIC BLOOD PRESSURE: 146 MMHG | HEART RATE: 47 BPM | HEIGHT: 69 IN | DIASTOLIC BLOOD PRESSURE: 62 MMHG | BODY MASS INDEX: 19.66 KG/M2

## 2022-06-30 LAB
ALBUMIN SERPL-MCNC: 3.7 G/DL (ref 3.4–5)
ANION GAP SERPL CALCULATED.3IONS-SCNC: 12 MMOL/L (ref 3–16)
ANTI-XA UNFRAC HEPARIN: 0.35 IU/ML (ref 0.3–0.7)
BASOPHILS ABSOLUTE: 0.1 K/UL (ref 0–0.2)
BASOPHILS RELATIVE PERCENT: 1.2 %
BUN BLDV-MCNC: 14 MG/DL (ref 7–20)
CALCIUM SERPL-MCNC: 9.2 MG/DL (ref 8.3–10.6)
CHLORIDE BLD-SCNC: 103 MMOL/L (ref 99–110)
CO2: 22 MMOL/L (ref 21–32)
CREAT SERPL-MCNC: 0.8 MG/DL (ref 0.8–1.3)
EOSINOPHILS ABSOLUTE: 0.3 K/UL (ref 0–0.6)
EOSINOPHILS RELATIVE PERCENT: 2.9 %
GFR AFRICAN AMERICAN: >60
GFR NON-AFRICAN AMERICAN: >60
GLUCOSE BLD-MCNC: 93 MG/DL (ref 70–99)
HCT VFR BLD CALC: 39.6 % (ref 40.5–52.5)
HEMOGLOBIN: 12.6 G/DL (ref 13.5–17.5)
LYMPHOCYTES ABSOLUTE: 2.6 K/UL (ref 1–5.1)
LYMPHOCYTES RELATIVE PERCENT: 23.7 %
MAGNESIUM: 1.7 MG/DL (ref 1.8–2.4)
MCH RBC QN AUTO: 27 PG (ref 26–34)
MCHC RBC AUTO-ENTMCNC: 31.9 G/DL (ref 31–36)
MCV RBC AUTO: 84.7 FL (ref 80–100)
MONOCYTES ABSOLUTE: 0.9 K/UL (ref 0–1.3)
MONOCYTES RELATIVE PERCENT: 8.5 %
NEUTROPHILS ABSOLUTE: 7.1 K/UL (ref 1.7–7.7)
NEUTROPHILS RELATIVE PERCENT: 63.7 %
PDW BLD-RTO: 14.6 % (ref 12.4–15.4)
PHOSPHORUS: 3.9 MG/DL (ref 2.5–4.9)
PLATELET # BLD: 203 K/UL (ref 135–450)
PMV BLD AUTO: 9.1 FL (ref 5–10.5)
POTASSIUM SERPL-SCNC: 4.4 MMOL/L (ref 3.5–5.1)
RBC # BLD: 4.67 M/UL (ref 4.2–5.9)
SODIUM BLD-SCNC: 137 MMOL/L (ref 136–145)
WBC # BLD: 11.1 K/UL (ref 4–11)

## 2022-06-30 PROCEDURE — 6370000000 HC RX 637 (ALT 250 FOR IP): Performed by: STUDENT IN AN ORGANIZED HEALTH CARE EDUCATION/TRAINING PROGRAM

## 2022-06-30 PROCEDURE — 80069 RENAL FUNCTION PANEL: CPT

## 2022-06-30 PROCEDURE — 99232 SBSQ HOSP IP/OBS MODERATE 35: CPT | Performed by: NURSE PRACTITIONER

## 2022-06-30 PROCEDURE — 36415 COLL VENOUS BLD VENIPUNCTURE: CPT

## 2022-06-30 PROCEDURE — 83735 ASSAY OF MAGNESIUM: CPT

## 2022-06-30 PROCEDURE — 6370000000 HC RX 637 (ALT 250 FOR IP): Performed by: NURSE PRACTITIONER

## 2022-06-30 PROCEDURE — 85520 HEPARIN ASSAY: CPT

## 2022-06-30 PROCEDURE — 85025 COMPLETE CBC W/AUTO DIFF WBC: CPT

## 2022-06-30 PROCEDURE — 6360000002 HC RX W HCPCS: Performed by: STUDENT IN AN ORGANIZED HEALTH CARE EDUCATION/TRAINING PROGRAM

## 2022-06-30 RX ORDER — ROSUVASTATIN CALCIUM 40 MG/1
40 TABLET, COATED ORAL NIGHTLY
Qty: 30 TABLET | Refills: 3 | Status: SHIPPED | OUTPATIENT
Start: 2022-06-30

## 2022-06-30 RX ORDER — CLOPIDOGREL BISULFATE 75 MG/1
75 TABLET ORAL DAILY
Qty: 30 TABLET | Refills: 3 | Status: SHIPPED | OUTPATIENT
Start: 2022-06-30 | End: 2022-06-30 | Stop reason: HOSPADM

## 2022-06-30 RX ADMIN — HEPARIN SODIUM 1160 UNITS/HR: 10000 INJECTION, SOLUTION INTRAVENOUS at 08:59

## 2022-06-30 RX ADMIN — APIXABAN 10 MG: 5 TABLET, FILM COATED ORAL at 10:37

## 2022-06-30 RX ADMIN — PANTOPRAZOLE SODIUM 40 MG: 40 TABLET, DELAYED RELEASE ORAL at 08:59

## 2022-06-30 RX ADMIN — HEPARIN SODIUM 18 UNITS/KG/HR: 10000 INJECTION, SOLUTION INTRAVENOUS at 09:11

## 2022-06-30 ASSESSMENT — PAIN SCALES - GENERAL: PAINLEVEL_OUTOF10: 0

## 2022-06-30 NOTE — FLOWSHEET NOTE
1515 Discharge instructions given to the Pt, he denies any questions. Pt's family obtained the Pt's medications from pharmacy prior to discharge. [phone] Pt discharged home with all personal belongings.

## 2022-06-30 NOTE — PLAN OF CARE
Problem: Discharge Planning  Goal: Discharge to home or other facility with appropriate resources  6/30/2022 1351 by Teressa Pérez RN  Outcome: Adequate for Discharge  6/30/2022 1350 by Teressa Pérez RN  Outcome: Adequate for Discharge  6/30/2022 0818 by Teressa Pérez RN  Outcome: Progressing  Flowsheets (Taken 6/30/2022 0800)  Discharge to home or other facility with appropriate resources: Identify barriers to discharge with patient and caregiver  6/30/2022 0625 by Carolyn Mccauley RN  Outcome: Progressing  Flowsheets (Taken 6/29/2022 2000)  Discharge to home or other facility with appropriate resources:   Identify barriers to discharge with patient and caregiver   Refer to discharge planning if patient needs post-hospital services based on physician order or complex needs related to functional status, cognitive ability or social support system     Problem: Pain  Goal: Verbalizes/displays adequate comfort level or baseline comfort level  6/30/2022 1351 by Teressa Pérez RN  Outcome: Adequate for Discharge  6/30/2022 1350 by Teressa Pérez RN  Outcome: Adequate for Discharge  6/30/2022 1031 by Teressa Pérez RN  Outcome: Progressing  6/30/2022 0625 by Carolyn Mccauley RN  Outcome: Progressing     Problem: Safety - Adult  Goal: Free from fall injury  6/30/2022 1350 by Teressa Pérez RN  Outcome: Adequate for Discharge  6/30/2022 9679 by Teressa Pérez RN  Outcome: Progressing  6/30/2022 0625 by Carolyn Mccauley RN  Outcome: Progressing  Flowsheets (Taken 6/29/2022 2032)  Free From Fall Injury: Instruct family/caregiver on patient safety     Problem: Chronic Conditions and Co-morbidities  Goal: Patient's chronic conditions and co-morbidity symptoms are monitored and maintained or improved  6/30/2022 1350 by Teressa Pérez RN  Outcome: Adequate for Discharge  6/30/2022 3336 by Teressa Pérez RN  Outcome: Progressing  Flowsheets (Taken 6/30/2022 0800)  Care Plan - Patient's Chronic Conditions and Co-Morbidity Symptoms are Monitored and Maintained or Improved: Monitor and assess patient's chronic conditions and comorbid symptoms for stability, deterioration, or improvement  6/30/2022 0625 by Ester Powers RN  Outcome: Progressing  Flowsheets (Taken 6/29/2022 2000)  Care Plan - Patient's Chronic Conditions and Co-Morbidity Symptoms are Monitored and Maintained or Improved: Monitor and assess patient's chronic conditions and comorbid symptoms for stability, deterioration, or improvement     Problem: ABCDS Injury Assessment  Goal: Absence of physical injury  6/30/2022 1350 by Adrian Alvarez RN  Outcome: Adequate for Discharge  6/30/2022 0918 by Adrian Alvarez RN  Outcome: Progressing  6/30/2022 0625 by Ester Powers RN  Outcome: Progressing  Flowsheets (Taken 6/29/2022 2032)  Absence of Physical Injury: Implement safety measures based on patient assessment     Problem: Neurosensory - Adult  Goal: Achieves stable or improved neurological status  6/30/2022 1350 by Adrian Alvarez RN  Outcome: Adequate for Discharge  6/30/2022 8483 by Adrian Alvarez RN  Outcome: Progressing  Flowsheets (Taken 6/30/2022 0800)  Achieves stable or improved neurological status: Assess for and report changes in neurological status  6/30/2022 0625 by Ester Powers RN  Outcome: Progressing  Flowsheets (Taken 6/29/2022 2000)  Achieves stable or improved neurological status:   Assess for and report changes in neurological status   Maintain blood pressure and fluid volume within ordered parameters to optimize cerebral perfusion and minimize risk of hemorrhage  Goal: Absence of seizures  6/30/2022 1350 by Adrian Alvarez RN  Outcome: Adequate for Discharge  6/30/2022 0243 by Adrian Alvarez RN  Outcome: Progressing  Flowsheets (Taken 6/30/2022 0800)  Absence of seizures: Monitor for seizure activity.   If seizure occurs, document type and location of movements and any associated apnea  6/30/2022 0625 by Valentina Brooks RN  Outcome: Progressing  Flowsheets (Taken 6/29/2022 2000)  Absence of seizures: Monitor for seizure activity.   If seizure occurs, document type and location of movements and any associated apnea  Goal: Remains free of injury related to seizures activity  6/30/2022 1350 by Jeet Turcios RN  Outcome: Adequate for Discharge  6/30/2022 1555 by Jeet Turcios RN  Outcome: Progressing  Flowsheets (Taken 6/30/2022 0800)  Remains free of injury related to seizure activity: Maintain airway, patient safety  and administer oxygen as ordered  Goal: Achieves maximal functionality and self care  6/30/2022 1350 by Jeet Turcios RN  Outcome: Adequate for Discharge  6/30/2022 3201 by Jeet Turcios RN  Outcome: Progressing  Flowsheets (Taken 6/30/2022 0800)  Achieves maximal functionality and self care: Monitor swallowing and airway patency with patient fatigue and changes in neurological status  6/30/2022 0625 by Valentina Brooks RN  Outcome: Progressing     Problem: Respiratory - Adult  Goal: Achieves optimal ventilation and oxygenation  6/30/2022 1350 by Jeet Turcios RN  Outcome: Adequate for Discharge  6/30/2022 1853 by Jeet Turcios RN  Outcome: Progressing  [address] (Taken 6/29/2022 2000 by Valnetina Brooks RN)  Achieves optimal ventilation and oxygenation:   Assess for changes in respiratory status   Assess for changes in mentation and behavior   Position to facilitate oxygenation and minimize respiratory effort   Assess and instruct to report shortness of breath or any respiratory difficulty     Problem: Cardiovascular - Adult  Goal: Maintains optimal cardiac output and hemodynamic stability  6/30/2022 1350 by Jeet Turcios RN  Outcome: Adequate for Discharge  6/30/2022 2283 by Jeet Turcios RN  Outcome: Progressing  6/30/2022 0625 by Valentina Brooks needed     Problem: Gastrointestinal - Adult  Goal: Minimal or absence of nausea and vomiting  6/30/2022 1350 by Carina Ferreira RN  Outcome: Adequate for Discharge  6/30/2022 0918 by Carina Ferreira RN  Outcome: Progressing  Flowsheets (Taken 6/30/2022 0800)  Minimal or absence of nausea and vomiting: Administer IV fluids as ordered to ensure adequate hydration  6/30/2022 0625 by Rhys Raines RN  Outcome: Progressing  Goal: Maintains or returns to baseline bowel function  6/30/2022 1350 by Carina Ferreira RN  Outcome: Adequate for Discharge  6/30/2022 5394 by Carina Ferreira RN  Outcome: Progressing  Flowsheets (Taken 6/30/2022 0800)  Maintains or returns to baseline bowel function: Assess bowel function  Goal: Maintains adequate nutritional intake  6/30/2022 1350 by Carina Ferreira RN  Outcome: Adequate for Discharge  6/30/2022 6047 by Carina Ferreira RN  Outcome: Progressing  Flowsheets (Taken 6/30/2022 0800)  Maintains adequate nutritional intake: Monitor percentage of each meal consumed  Goal: Establish and maintain optimal ostomy function  6/30/2022 1350 by Carina Ferreira RN  Outcome: Adequate for Discharge  6/30/2022 5375 by Carina Ferreira RN  Outcome: Progressing  Flowsheets (Taken 6/30/2022 0800)  Establish and maintain optimal ostomy function:   Monitor output from ostomies   Infuse IV Fluids/TPN as ordered     Problem: Genitourinary - Adult  Goal: Absence of urinary retention  6/30/2022 1350 by Carina Ferreira RN  Outcome: Adequate for Discharge  6/30/2022 0496 by Carina Ferreira RN  Outcome: Progressing  Flowsheets (Taken 6/30/2022 0800)  Absence of urinary retention: Assess patients ability to void and empty bladder     Problem: Infection - Adult  Goal: Absence of infection at discharge  6/30/2022 1350 by Carina Ferreira RN  Outcome: Adequate for Discharge  6/30/2022 5387 by Carina Ferreira RN  Outcome: Progressing  Flowsheets  Taken 6/30/2022 0800 by Darlyn De Paz RN  Absence of infection at discharge: Assess and monitor for signs and symptoms of infection  Taken 6/29/2022 2000 by Hayden Fuentes RN  Absence of infection at discharge:   Assess and monitor for signs and symptoms of infection   Monitor lab/diagnostic results   Monitor all insertion sites i.e., indwelling lines, tubes and drains  Goal: Absence of infection during hospitalization  6/30/2022 1350 by Darlyn De Paz RN  Outcome: Adequate for Discharge  6/30/2022 0918 by Darlyn De Paz RN  Outcome: Progressing  Flowsheets  Taken 6/30/2022 0800 by Darlyn De Paz RN  Absence of infection during hospitalization: Assess and monitor for signs and symptoms of infection  Taken 6/29/2022 2000 by Hayden Fuentes RN  Absence of infection during hospitalization:   Monitor all insertion sites i.e., indwelling lines, tubes and drains   Monitor lab/diagnostic results   Assess and monitor for signs and symptoms of infection     Problem: Metabolic/Fluid and Electrolytes - Adult  Goal: Electrolytes maintained within normal limits  6/30/2022 1350 by Darlyn De Paz RN  Outcome: Adequate for Discharge  6/30/2022 5993 by Darlyn De Paz RN  Outcome: Progressing  Flowsheets  Taken 6/30/2022 0800 by Darlyn De Paz RN  Electrolytes maintained within normal limits: Monitor labs and assess patient for signs and symptoms of electrolyte imbalances  Taken 6/29/2022 2000 by Hayden Fuentes RN  Electrolytes maintained within normal limits: Monitor labs and assess patient for signs and symptoms of electrolyte imbalances  Goal: Hemodynamic stability and optimal renal function maintained  6/30/2022 1350 by Darlyn De Paz RN  Outcome: Adequate for Discharge  6/30/2022 4814 by Darlyn De Paz RN  Outcome: Progressing  Flowsheets  Taken 6/30/2022 0800 by Darlyn De Paz RN  Hemodynamic stability and optimal renal function maintained: Monitor labs and assess for signs and symptoms of volume excess or deficit  Taken 6/29/2022 2000 by Westley Garcia RN  Hemodynamic stability and optimal renal function maintained:   Monitor labs and assess for signs and symptoms of volume excess or deficit   Monitor intake, output and patient weight  Goal: Glucose maintained within prescribed range  6/30/2022 1350 by Logan Tejada RN  Outcome: Adequate for Discharge  6/30/2022 6346 by Logan Tejada RN  Outcome: Progressing  Flowsheets (Taken 6/30/2022 0800)  Glucose maintained within prescribed range: Monitor blood glucose as ordered     Problem: Hematologic - Adult  Goal: Maintains hematologic stability  6/30/2022 1350 by Logan Tejada RN  Outcome: Adequate for Discharge  6/30/2022 1931 by Logan Tejada RN  Outcome: Progressing  Flowsheets  Taken 6/30/2022 0800 by Logan Tejada RN  Maintains hematologic stability: Assess for signs and symptoms of bleeding or hemorrhage  Taken 6/29/2022 2000 by Westley Garcia RN  Maintains hematologic stability:   Assess for signs and symptoms of bleeding or hemorrhage   Monitor labs for bleeding or clotting disorders

## 2022-06-30 NOTE — PLAN OF CARE
Problem: Discharge Planning  Goal: Discharge to home or other facility with appropriate resources  6/30/2022 0625 by Narcisa Botello RN  Outcome: Progressing  Flowsheets (Taken 6/29/2022 2000)  Discharge to home or other facility with appropriate resources:   Identify barriers to discharge with patient and caregiver   Refer to discharge planning if patient needs post-hospital services based on physician order or complex needs related to functional status, cognitive ability or social support system  6/29/2022 1651 by Gretchen Tomlinson RN  Outcome: Progressing  Flowsheets (Taken 6/29/2022 1600)  Discharge to home or other facility with appropriate resources:   Identify barriers to discharge with patient and caregiver   Arrange for needed discharge resources and transportation as appropriate   Identify discharge learning needs (meds, wound care, etc)   Arrange for interpreters to assist at discharge as needed   Refer to discharge planning if patient needs post-hospital services based on physician order or complex needs related to functional status, cognitive ability or social support system     Problem: Pain  Goal: Verbalizes/displays adequate comfort level or baseline comfort level  6/30/2022 0625 by Narcisa Botello RN  Outcome: Progressing  6/29/2022 1651 by Gretchen Tomlinson RN  Outcome: Progressing  Flowsheets  Taken 6/29/2022 1600 by Gretchen Tomlinson RN  Verbalizes/displays adequate comfort level or baseline comfort level:   Encourage patient to monitor pain and request assistance   Assess pain using appropriate pain scale   Administer analgesics based on type and severity of pain and evaluate response   Implement non-pharmacological measures as appropriate and evaluate response   Consider cultural and social influences on pain and pain management   Notify Licensed Independent Practitioner if interventions unsuccessful or patient reports new pain  Taken 6/29/2022 1000 by Daniel January, RN  Verbalizes/displays adequate comfort level or baseline comfort level:   Encourage patient to monitor pain and request assistance   Assess pain using appropriate pain scale   Administer analgesics based on type and severity of pain and evaluate response   Implement non-pharmacological measures as appropriate and evaluate response   Consider cultural and social influences on pain and pain management   Notify Licensed Independent Practitioner if interventions unsuccessful or patient reports new pain     Problem: Safety - Adult  Goal: Free from fall injury  6/30/2022 0625 by Kecia Luong RN  Outcome: Progressing  Flowsheets (Taken 6/29/2022 2032)  Free From Fall Injury: Instruct family/caregiver on patient safety  6/29/2022 1651 by Emerita Tim RN  Outcome: Progressing  Flowsheets (Taken 6/29/2022 1600)  Free From Fall Injury:   Shannan Seb family/caregiver on patient safety   Based on caregiver fall risk screen, instruct family/caregiver to ask for assistance with transferring infant if caregiver noted to have fall risk factors     Problem: Chronic Conditions and Co-morbidities  Goal: Patient's chronic conditions and co-morbidity symptoms are monitored and maintained or improved  6/30/2022 0625 by Kecia Luong RN  Outcome: Progressing  Flowsheets (Taken 6/29/2022 2000)  Care Plan - Patient's Chronic Conditions and Co-Morbidity Symptoms are Monitored and Maintained or Improved: Monitor and assess patient's chronic conditions and comorbid symptoms for stability, deterioration, or improvement  6/29/2022 1651 by Emerita Tim RN  Outcome: Progressing  Flowsheets (Taken 6/29/2022 1600)  Care Plan - Patient's Chronic Conditions and Co-Morbidity Symptoms are Monitored and Maintained or Improved:   Collaborate with multidisciplinary team to address chronic and comorbid conditions and prevent exacerbation or deterioration   Monitor and assess patient's chronic conditions and comorbid symptoms for stability, deterioration, or improvement     Problem: ABCDS Injury Assessment  Goal: Absence of physical injury  6/30/2022 6115 by Jaden Ceron RN  Outcome: Progressing  Flowsheets (Taken 6/29/2022 2032)  Absence of Physical Injury: Implement safety measures based on patient assessment  6/29/2022 1651 by Kaia Martin RN  Outcome: Progressing  Flowsheets (Taken 6/29/2022 1600)  Absence of Physical Injury: Implement safety measures based on patient assessment     Problem: Neurosensory - Adult  Goal: Achieves stable or improved neurological status  6/30/2022 0625 by Jaden Ceron RN  Outcome: Progressing  Flowsheets (Taken 6/29/2022 2000)  Achieves stable or improved neurological status:   Assess for and report changes in neurological status   Maintain blood pressure and fluid volume within ordered parameters to optimize cerebral perfusion and minimize risk of hemorrhage  6/29/2022 1651 by Kaia Martin RN  Outcome: Progressing  Flowsheets (Taken 6/29/2022 1600)  Achieves stable or improved neurological status:   Assess for and report changes in neurological status   Initiate measures to prevent increased intracranial pressure   Maintain blood pressure and fluid volume within ordered parameters to optimize cerebral perfusion and minimize risk of hemorrhage   Monitor temperature, glucose, and sodium. Initiate appropriate interventions as ordered  Goal: Absence of seizures  6/30/2022 0737 by Jaden Ceron RN  Outcome: Progressing  Flowsheets (Taken 6/29/2022 2000)  Absence of seizures: Monitor for seizure activity. If seizure occurs, document type and location of movements and any associated apnea  6/29/2022 1651 by Kaia Martin RN  Outcome: Progressing  Flowsheets (Taken 6/29/2022 1600)  Absence of seizures:   Monitor for seizure activity.   If seizure occurs, document type and location of movements and any associated apnea   If seizure occurs, turn head to side and suction secretions as needed   Administer anticonvulsants as ordered   Support airway/breathing, administer oxygen as needed   Diagnostic studies as ordered  Goal: Remains free of injury related to seizures activity  6/29/2022 1651 by Landen Ridley RN  Outcome: Progressing  Flowsheets (Taken 6/29/2022 1600)  Remains free of injury related to seizure activity:   Maintain airway, patient safety  and administer oxygen as ordered   Monitor patient for seizure activity, document and report duration and description of seizure to Licensed Independent Practitioner   If seizure occurs, turn patient to side and suction secretions as needed   Reorient patient post seizure   Seizure pads on all 4 side rails   Instruct patient/family to notify RN of any seizure activity   Instruct patient/family to call for assistance with activity based on assessment  Goal: Achieves maximal functionality and self care  6/30/2022 0625 by Columbus Leyden, RN  Outcome: Progressing  6/29/2022 1651 by Landen Ridley RN  Outcome: Progressing  Flowsheets (Taken 6/29/2022 1600)  Achieves maximal functionality and self care:   Monitor swallowing and airway patency with patient fatigue and changes in neurological status   Encourage and assist patient to increase activity and self care with guidance from physical therapy/occupational therapy   Encourage visually impaired, hearing impaired and aphasic patients to use assistive/communication devices     Problem: Respiratory - Adult  Goal: Achieves optimal ventilation and oxygenation  Recent Flowsheet Documentation  Taken 6/29/2022 2000 by Columbus Leyden, RN  Achieves optimal ventilation and oxygenation:   Assess for changes in respiratory status   Assess for changes in mentation and behavior   Position to facilitate oxygenation and minimize respiratory effort   Assess and instruct to report shortness of breath or any respiratory difficulty  6/29/2022 1651 by Landen Ridley RN  Outcome: Progressing  Flowsheets (Taken 6/29/2022 1600)  Achieves optimal ventilation and oxygenation:   Assess for changes in respiratory status   Assess for changes in mentation and behavior   Position to facilitate oxygenation and minimize respiratory effort   Oxygen supplementation based on oxygen saturation or arterial blood gases   Initiate smoking cessation protocol as indicated   Encourage broncho-pulmonary hygiene including cough, deep breathe, incentive spirometry   Assess the need for suctioning and aspirate as needed   Assess and instruct to report shortness of breath or any respiratory difficulty   Respiratory therapy support as indicated     Problem: Cardiovascular - Adult  Goal: Maintains optimal cardiac output and hemodynamic stability  6/30/2022 0625 by Azalea Mccabe RN  Outcome: Progressing  Flowsheets (Taken 6/29/2022 2000)  Maintains optimal cardiac output and hemodynamic stability:   Monitor blood pressure and heart rate   Monitor urine output and notify Licensed Independent Practitioner for values outside of normal range   Assess for signs of decreased cardiac output  6/29/2022 1651 by Kel Hancock RN  Outcome: Progressing  Flowsheets (Taken 6/29/2022 1600)  Maintains optimal cardiac output and hemodynamic stability:   Monitor blood pressure and heart rate   Monitor urine output and notify Licensed Independent Practitioner for values outside of normal range   Assess for signs of decreased cardiac output   Administer fluid and/or volume expanders as ordered   Administer vasoactive medications as ordered  Goal: Absence of cardiac dysrhythmias or at baseline  Recent Flowsheet Documentation  Taken 6/29/2022 2000 by Azalea Mccabe RN  Absence of cardiac dysrhythmias or at baseline:   Monitor cardiac rate and rhythm   Assess for signs of decreased cardiac output  6/29/2022 1651 by Kel Hancock RN  Outcome: Progressing  Flowsheets (Taken 6/29/2022 1600)  Absence of cardiac dysrhythmias or at baseline:   Monitor cardiac rate and rhythm   Assess for signs of decreased cardiac output   Administer antiarrhythmia medication and electrolyte replacement as ordered     Problem: Skin/Tissue Integrity - Adult  Goal: Skin integrity remains intact  6/30/2022 0625 by Eriberto Valentine RN  Outcome: Progressing  Flowsheets (Taken 6/29/2022 2032)  Skin Integrity Remains Intact: Assess vascular access sites hourly  6/29/2022 1651 by Anabel Garcia RN  Outcome: Progressing  Flowsheets  Taken 6/29/2022 1600 by Anabel Garcia RN  Skin Integrity Remains Intact:   Monitor for areas of redness and/or skin breakdown   Assess vascular access sites hourly   Every 4-6 hours minimum: Change oxygen saturation probe site  Taken 6/29/2022 0924 by Mohit Church RN  Skin Integrity Remains Intact:   Monitor for areas of redness and/or skin breakdown   Assess vascular access sites hourly  Goal: Incisions, wounds, or drain sites healing without S/S of infection  Recent Flowsheet Documentation  Taken 6/29/2022 2032 by Eriberto Valentine RN  Incisions, Wounds, or Drain Sites Healing Without Sign and Symptoms of Infection:   ADMISSION and DAILY: Assess and document risk factors for pressure ulcer development   TWICE DAILY: Assess and document skin integrity  6/29/2022 1651 by Anabel Garcia RN  Outcome: Progressing  Flowsheets  Taken 6/29/2022 1600 by Anabel Garcia RN  Incisions, Wounds, or Drain Sites Healing Without Sign and Symptoms of Infection:   ADMISSION and DAILY: Assess and document risk factors for pressure ulcer development   TWICE DAILY: Assess and document skin integrity   TWICE DAILY: Assess and document dressing/incision, wound bed, drain sites and surrounding tissue   Implement wound care per orders   Initiate isolation precautions as appropriate   Initiate pressure ulcer prevention bundle as indicated  Taken 6/29/2022 0924 by Mohit Church RN  Incisions, Wounds, or Drain Sites Healing Without Sign and Symptoms of Infection: ADMISSION and DAILY: Assess and document risk factors for pressure ulcer development  Goal: Oral mucous membranes remain intact  Recent Flowsheet Documentation  Taken 6/29/2022 2032 by Rhys Raines RN  Oral Mucous Membranes Remain Intact: Assess oral mucosa and hygiene practices  6/29/2022 1651 by Erick Oconnor RN  Outcome: Progressing  Flowsheets  Taken 6/29/2022 1600 by Erick Oconnor RN  Oral Mucous Membranes Remain Intact:   Assess oral mucosa and hygiene practices   Implement preventative oral hygiene regimen   Implement oral medicated treatments as ordered  Taken 6/29/2022 0924 by Caroline Portillo RN  Oral Mucous Membranes Remain Intact:   Assess oral mucosa and hygiene practices   Implement preventative oral hygiene regimen     Problem: Musculoskeletal - Adult  Goal: Return mobility to safest level of function  6/30/2022 0625 by Rhys Raines RN  Outcome: Progressing  Flowsheets (Taken 6/29/2022 2000)  Return Mobility to Safest Level of Function: Assess patient stability and activity tolerance for standing, transferring and ambulating with or without assistive devices  6/29/2022 1651 by Erick Oconnor RN  Outcome: Progressing  Flowsheets (Taken 6/29/2022 1600)  Return Mobility to Safest Level of Function:   Assess patient stability and activity tolerance for standing, transferring and ambulating with or without assistive devices   Assist with transfers and ambulation using safe patient handling equipment as needed   Ensure adequate protection for wounds/incisions during mobilization   Obtain physical therapy/occupational therapy consults as needed   Apply continuous passive motion per provider or physical therapy orders to increase flexion toward goal   Instruct patient/family in ordered activity level  Goal: Maintain proper alignment of affected body part  6/29/2022 1651 by Erick Oconnor RN  Outcome: Progressing  Flowsheets (Taken 6/29/2022 1600)  Maintain proper alignment of affected body part:   Support and protect limb and body alignment per provider's orders   Instruct and reinforce with patient and family use of appropriate assistive device and precautions (e.g. spinal or hip dislocation precautions)  Goal: Return ADL status to a safe level of function  Recent Flowsheet Documentation  Taken 6/29/2022 2000 by Clifton Valenzuela RN  Return ADL Status to a Safe Level of Function: Assess activities of daily living deficits and provide assistive devices as needed  6/29/2022 1651 by Jefferson Cole RN  Outcome: Progressing  Flowsheets (Taken 6/29/2022 1600)  Return ADL Status to a Safe Level of Function:   Administer medication as ordered   Assess activities of daily living deficits and provide assistive devices as needed   Obtain physical therapy/occupational therapy consults as needed   Assist and instruct patient to increase activity and self care as tolerated     Problem: Gastrointestinal - Adult  Goal: Minimal or absence of nausea and vomiting  6/30/2022 0625 by Clifton Valenzuela RN  Outcome: Progressing  6/29/2022 1651 by Jefferson Cole RN  Outcome: Progressing  Flowsheets (Taken 6/29/2022 1600)  Minimal or absence of nausea and vomiting:   Administer IV fluids as ordered to ensure adequate hydration   Maintain NPO status until nausea and vomiting are resolved   Nasogastric tube to low intermittent suction as ordered   Administer ordered antiemetic medications as needed   Provide nonpharmacologic comfort measures as appropriate   Advance diet as tolerated, if ordered   Nutrition consult to assist patient with adequate nutrition and appropriate food choices  Goal: Maintains or returns to baseline bowel function  6/29/2022 1651 by Jefferson Cole RN  Outcome: Progressing  Flowsheets (Taken 6/29/2022 1600)  Maintains or returns to baseline bowel function:   Assess bowel function   Encourage oral fluids to ensure adequate hydration   Administer IV fluids as ordered to ensure adequate hydration   Administer ordered medications as needed   Encourage mobilization and activity   Nutrition consult to assist patient with appropriate food choices  Goal: Maintains adequate nutritional intake  6/29/2022 1651 by Salvador Morel RN  Outcome: Progressing  Flowsheets (Taken 6/29/2022 1600)  Maintains adequate nutritional intake:   Monitor percentage of each meal consumed   Identify factors contributing to decreased intake, treat as appropriate   Assist with meals as needed   Monitor intake and output, weight and lab values   Obtain nutritional consult as needed  Goal: Establish and maintain optimal ostomy function  6/29/2022 1651 by Salvador Morel RN  Outcome: Progressing     Problem: Genitourinary - Adult  Goal: Absence of urinary retention  6/29/2022 1651 by Salvador Morel RN  Outcome: Progressing     Problem: Infection - Adult  Goal: Absence of infection at discharge  Recent Flowsheet Documentation  Taken 6/29/2022 2000 by Aren Escalante RN  Absence of infection at discharge:   Assess and monitor for signs and symptoms of infection   Monitor lab/diagnostic results   Monitor all insertion sites i.e., indwelling lines, tubes and drains  6/29/2022 1651 by Salvador Morel RN  Outcome: Progressing  Flowsheets (Taken 6/29/2022 1600)  Absence of infection at discharge:   Assess and monitor for signs and symptoms of infection   Monitor lab/diagnostic results   Monitor all insertion sites i.e., indwelling lines, tubes and drains   [place] appropriate cooling/warming therapies per order   Administer medications as ordered   Instruct and encourage patient and family to use good hand hygiene technique   Identify and instruct in appropriate isolation precautions for identified infection/condition  Goal: Absence of infection during hospitalization  Recent Flowsheet Documentation  Taken 6/29/2022 2000 by Aren Escalante RN  Absence of infection during hospitalization:   Monitor all insertion sites i.e., indwelling lines, tubes and drains   Monitor lab/diagnostic results   Assess and monitor for signs and symptoms of infection  6/29/2022 1651 by Leora Scott RN  Outcome: Progressing  Flowsheets (Taken 6/29/2022 1600)  Absence of infection during hospitalization:   Assess and monitor for signs and symptoms of infection   Monitor lab/diagnostic results   Monitor all insertion sites i.e., indwelling lines, tubes and drains   Monitor endotracheal (as able) and nasal secretions for changes in amount and color   [place] appropriate cooling/warming therapies per order   Administer medications as ordered   Instruct and encourage patient and family to use good hand hygiene technique   Identify and instruct in appropriate isolation precautions for identified infection/condition     Problem: Metabolic/Fluid and Electrolytes - Adult  Goal: Electrolytes maintained within normal limits  Recent Flowsheet Documentation  Taken 6/29/2022 2000 by Raymundo Mccord RN  Electrolytes maintained within normal limits: Monitor labs and assess patient for signs and symptoms of electrolyte imbalances  6/29/2022 1651 by Leora Scott RN  Outcome: Progressing  Flowsheets (Taken 6/29/2022 1600)  Electrolytes maintained within normal limits:   Monitor labs and assess patient for signs and symptoms of electrolyte imbalances   Administer electrolyte replacement as ordered   Monitor response to electrolyte replacements, including repeat lab results as appropriate   Fluid restriction as ordered   Instruct patient on fluid and nutrition restrictions as appropriate  Goal: Hemodynamic stability and optimal renal function maintained  Recent Flowsheet Documentation  Taken 6/29/2022 2000 by Raymundo Mccord RN  Hemodynamic stability and optimal renal function maintained:   Monitor labs and assess for signs and symptoms of volume excess or deficit   Monitor intake, output and patient weight  6/29/2022 1651 by Nina Torrez RN  Outcome: Progressing  Flowsheets (Taken 6/29/2022 1600)  Hemodynamic stability and optimal renal function maintained:   Monitor labs and assess for signs and symptoms of volume excess or deficit   Monitor intake, output and patient weight   Monitor urine specific gravity, serum osmolarity and serum sodium as indicated or ordered   Monitor response to interventions for patient's volume status, including labs, urine output, blood pressure (other measures as available)   Encourage oral intake as appropriate   Instruct patient on fluid and nutrition restrictions as appropriate  Goal: Glucose maintained within prescribed range  6/29/2022 1651 by Nina Torrez RN  Outcome: Progressing  Flowsheets (Taken 6/29/2022 1600)  Glucose maintained within prescribed range:   Monitor blood glucose as ordered   Assess for signs and symptoms of hyperglycemia and hypoglycemia   Assess barriers to adequate nutritional intake and initiate nutrition consult as needed   Administer ordered medications to maintain glucose within target range   Instruct patient on self management of diabetes and initiate consult as needed     Problem: Hematologic - Adult  Goal: Maintains hematologic stability  Recent Flowsheet Documentation  Taken 6/29/2022 2000 by Blima Crigler, RN  Maintains hematologic stability:   Assess for signs and symptoms of bleeding or hemorrhage   Monitor labs for bleeding or clotting disorders  6/29/2022 1651 by Nina Torrez RN  Outcome: Progressing  Flowsheets (Taken 6/29/2022 1600)  Maintains hematologic stability:   Monitor labs for bleeding or clotting disorders   Assess for signs and symptoms of bleeding or hemorrhage   Administer blood products/factors as ordered

## 2022-06-30 NOTE — PROGRESS NOTES
NEUROLOGY / NEUROCRITICAL CARE PROGRESS NOTE       Patient Name: Taty Oakes YOB: 1955   Sex: Male Age: 75 yrs     CC / Reason for Consult: ischemic stroke vs TIA in the context of L ICA origin stenosis with concern of intraluminal thrombus formation    Interval Hx / Changes over last 24 hours:     Clinically stable. ROS: No headache, no focal weakness, no speech difficulties    HISTORY   Admission HPI:   Taty Oakes is a 75 y.o. y/o male with PMH significant for arthritis, GERD, HLD, incomplete RBB.      Reported time of onset at 12:20 with slurred speech. Per my interview with the patient, reports that he also had difficulty grasping objects with his R hand and felt like it was more difficult to walk, but does not think that he was necessarily weak. Patient's wife last saw patient normal before leaving for work that morning. He presented to the ER and the stroke team was notified. ER provider noted that the patient was dysarthric, had a RUE drift as well as a R facial droop and some RLE numbness. CT head with no acute abnormality. CTA head and neck reviewed by Neurovascular, demonstrates critical cervical L ICA origin stenosis without associated tandem intracranial occlusion with concern for potential associated intraluminal thrombus formation but there is streak artifact in this location as well. Not a candidate for TPA due to his presentation time. He was transferred to [facility] for further management, planning for MRI brain and MRA head and neck and potential low dose no bolus heparin gtt depending on MRAs.     Patient reports he does not have a history of high blood pressure, reports that last time he was at his PCP his systolic was 450. Reports he does not take a statin at home and that he is allergic to aspirin \"I broke out in hives\". He is a current smoker.  Currently reports that he feels like he has occaisional word finding difficulty, but feels that his speech does not seem slurred to him and his symptoms have significantly improved/otherwise resolved. No facial weakness or pronator drift observed. NIHSS 1 for reported word finding difficulty though he does well with formal testing. He denies associated headache, dizziness, lightheadedness, numbness/tingling. He has never had anything like this happen before. PMH Past Medical History:   Diagnosis Date    Arthritis     GERD (gastroesophageal reflux disease)     Hyperlipidemia     Hyperlipidemia 05/27/2012    Incomplete RBBB     Ruptured disk 2019    Stroke 06/25/2022    Varicose veins of both lower extremities       Allergies Allergies   Allergen Reactions    Aspirin Hives     Patient reports he got hives while taking ASA    Pcn [Penicillins] Hives      Diet ADULT DIET; Regular; 4 carb choices (60 gm/meal)   Isolation No active isolations     CURRENT SCHEDULED MEDICATIONS   Inpatient Medications     apixaban, 5 mg, Oral, BID    pantoprazole, 40 mg, Oral, QAM AC    rosuvastatin, 40 mg, Oral, Nightly   Infusions        LABS   Metabolic Panel Recent Labs     06/28/22  0547 06/29/22  0532 06/30/22  0439    133* 137   K 3.9 5.6* 4.4    103 103   CO2 24 18* 22   BUN 12 15 14   CREATININE 0.9 0.8 0.8   GLUCOSE 131* 82 93   CALCIUM 9.3 9.3 9.2   LABALBU 4.1 3.4 3.7   PHOS 3.8 3.7 3.9   MG 1.90 1.90 1.70*      CBC / Coags Recent Labs     06/28/22  0547 06/29/22  1805 06/30/22  0439   WBC 10.4 13.3* 11.1*   RBC 4.71 4.55 4.67   HGB 12.6* 12.5* 12.6*   HCT 38.8* 37.0* 39.6*    253 203      Other No results for input(s): LABA1C, LDLCALC, TRIG, TSH, [id number], FOLATE, LABSALI, COVID19 in the last 72 hours. DIAGNOSTICS   IMAGES:    MRA head and neck w/wo contrast:  MRA head:   1. No steno-occlusive disease. MRA NECK:   1.  Severe atherosclerotic plaque at the left carotid bifurcation with thrombus in the proximal left cervical internal carotid artery, mildly improved compared to the prior study with severe stenosis, previously appeared to be nearly occluded. 2. Severe stenosis of the proximal right vertebral artery, unchanged. MRI brain wo contrast:  1. Multiple small acute ischemic insults in the left cerebral hemisphere, unchanged. No hemorrhage or mass effect. 2. Moderate chronic small vessel ischemic matter disease. Previous Imaging:    MRI brain w/o giuliana  1. Multiple foci of restricted diffusion, acute infarct in left middle cerebral artery territory in the left frontal, left parietal and temporal parietal lobe cortex.       2. Underlying moderate to severe chronic small vessel disease, periventricular microangiopathic leukoencephalopathy. MRA of head / neck   Impression       1. Critical stenosis (greater than 90% NASCET criteria) of the left internal carotid artery origin related to densely calcified plaque when correlating with CTA appearance with additional contiguous noncalcified intraluminal filling defect suspicious for    thrombus in the proximal bulbar segment of the left internal carotid artery   2. No right carotid artery stenosis   3. No vertebral artery stenosis     Echo   Normal left ventricle size, wall thickness, and systolic function with an   estimated ejection fraction of 55-60%. No regional wall motion abnormalities   are seen. Diastolic filling parameters suggests normal diastolic function. IVC size is normal (<2.1cm) and collapses > 50% with respiration consistent   with normal RA pressure (3mmHg). Estimated pulmonary artery systolic   pressure is at 29 mmHg assuming a right atrial pressure of 3 mmHg. A bubble   study was performed and there are late bubbles suggestive of pulmonary AVMs   (no PFO).          EKG: NSR    PHYSICAL EXAMINATION     PHYSICAL EXAM:  Vitals:    06/30/22 0500 06/30/22 0600 06/30/22 0848 06/30/22 0909   BP:       Pulse: (!) 45 (!) 44     Resp: 15 13     Temp:   98 °F (36.7 °C)    TempSrc:   Oral    SpO2: 99% 98%     Weight:    132 lb 11.5 oz (60.2 kg)   Height:          PHYSICAL EXAM:  Vitals:    06/30/22 0500 06/30/22 0600 06/30/22 0848 06/30/22 0909   BP:       Pulse: (!) 45 (!) 44     Resp: 15 13     Temp:   98 °F (36.7 °C)    TempSrc:   Oral    SpO2: 99% 98%     Weight:    132 lb 11.5 oz (60.2 kg)   Height:             General: Alert, no distress, well-nourished  Neurologic  Mental status:   orientation to person, place, time, situation   Attention intact as able to attend well to the exam     Language fluent in conversation   Comprehension intact; follows simple commands    Cranial nerves:   CN2: Visual fields full w/o extinction on confrontational testing  CN 3,4,6: Pupils equal and reactive to light, extraocular muscles intact  CN5: Facial sensation symmetric   CN7: Face symmetric  CN8: Hearing symmetric to spoken voice  CN9: Palate elevated symmetrically  CN11: Traps full strength on shoulder shrug  CN12: Tongue midline with protrusion    Motor Exam:  Mild right pronator drift     R  L    Deltoid 5  5   Biceps 5 5   Triceps 5 5   Wrist extension  5 5   Interossei 5 5      R  L    Hip flexion  5  5   Hip extension  5 5   Knee flexion  5 5   Knee extension  5 5   Ankle dorsiflexion  5 5   Ankle plantar flexion  5 5     Sensory: light touch intact and symmetric in all 4 extremities. No sensory extinction on bilateral simultaneous stimulation  Cerebellar/coordination: finger nose finger normal without ataxia  Tone: normal in all 4 extremities  Gait: held for patient safety      OTHER SYSTEMS:  Cardiovascular: Warm, appears well perfused   Respiratory: Easy, non-labored respiratory pattern   Abdominal: Abdomen is without distention   Extremities: Upper and lower extremities are atraumatic in appearance without deformity. No swelling or erythema.        ASSESSMENT & RECOMMENDATIONS     Mr. Belia Nicole is a 75 y.o. y/o male with hyperlipidemia who presented with slurred speech, right facial weakness, RLE numbness and RUE pronator drift who was found to have critical stenosis of his critical cervical L ICA origin stenosis and some concern of associated thrombus, on heparin with therapeutic anti-Xa 0.55 (has been therapeutic since 6/26 14:20)    Neurologically intact. Still with proximal left ICA thrombus on imaging.      Plan:    -Apixaban 5mg PO BID on discharge  -Statin on discharge  -Repeat head and neck CTA with contrast in 3 weeks  -Follow-up with Helena Subramanian with [address] [place] [address] Neurosurgery in 3 weeks    [address] [place] [address]   Neurology & Neurocritical Care   Neurology Line: [phone]  [employer]: [facility] NPs  6/30/2022 9:51 AM

## 2022-06-30 NOTE — CARE COORDINATION
Addendum [phone]  Pt can DC home today. He stated he will find a ride. Meds-to-Beds. No CM needs. Case Management Assessment           Daily Note                 Date/ Time of Note: 6/30/2022 10:47 AM         Note completed by: Dajuan Camejo RN    Patient Name: Aurelio Hicks  YOB: 1955    Diagnosis:Acute CVA (cerebrovascular accident) [facility]) [I63.9]  Ischemic stroke [facility]) [I63.9]  Patient Admission Status: Inpatient    Date of Admission:6/25/2022  5:29 AM Length of Stay: 5 GLOS: GMLOS: 2.1    Current Plan of Care: Heparin gtt stopped. Switching to PO Eliquis  ________________________________________________________________________________________  PT AM-PAC: 24 / 24 per last evaluation on: 6/24    OT AM-PAC: 24 / 24 per last evaluation on: 6/24    Discharge Plan: Return home with family    Case Management Notes: Pt is from home with his family. Independent with self care and functional mobility prior to admission. No CM needs expected. Aristeo and his family were provided with choice of provider; he and his family are in agreement with the discharge plan.     Care Transition Patient: Yes    Dajuan Camejo RN  [facility]  Case Management Department  [phone]

## 2022-06-30 NOTE — CARE COORDINATION
CM caled to assist with prior authorization for Eliquis. [facility] insurance number to call for [facility] [phone]. After providing the pt's information, CM transferred to another department [phone] to further assist with authorization. The representative was having trouble with her computer and needed to reach out to her supervisor. She stated she would call back. CM time on this call 37 minutes. Waiting for call back regarding the status of authorization. Electronically signed by COLEEN Ruiz RN-[facility]  Case Management  [phone]    Addendum [phone]  Spoke with another representative at [address] to obtain prior authorization for Eliquis. It was approved. Case # [id number]. The OP pharmacy was notified. RN updated as well.

## 2022-06-30 NOTE — PLAN OF CARE
Problem: Discharge Planning  Goal: Discharge to home or other facility with appropriate resources  6/30/2022 0918 by Alden Charles RN  Outcome: Progressing  6/30/2022 0625 by Steff Parker RN  Outcome: Progressing  Flowsheets (Taken 6/29/2022 2000)  Discharge to home or other facility with appropriate resources:   Identify barriers to discharge with patient and caregiver   Refer to discharge planning if patient needs post-hospital services based on physician order or complex needs related to functional status, cognitive ability or social support system     Problem: Pain  Goal: Verbalizes/displays adequate comfort level or baseline comfort level  6/30/2022 0918 by Alden Charles RN  Outcome: Progressing  6/30/2022 0625 by Steff Parker RN  Outcome: Progressing     Problem: Safety - Adult  Goal: Free from fall injury  6/30/2022 0918 by Alden Charles RN  Outcome: Progressing  6/30/2022 0625 by Steff Parker RN  Outcome: Progressing  Flowsheets (Taken 6/29/2022 2032)  Free From Fall Injury: Instruct family/caregiver on patient safety     Problem: Chronic Conditions and Co-morbidities  Goal: Patient's chronic conditions and co-morbidity symptoms are monitored and maintained or improved  6/30/2022 0918 by Alden Charles RN  Outcome: Progressing  6/30/2022 0625 by Steff Parker RN  Outcome: Progressing  Flowsheets (Taken 6/29/2022 2000)  Care Plan - Patient's Chronic Conditions and Co-Morbidity Symptoms are Monitored and Maintained or Improved: Monitor and assess patient's chronic conditions and comorbid symptoms for stability, deterioration, or improvement     Problem: ABCDS Injury Assessment  Goal: Absence of physical injury  6/30/2022 0918 by Alden Charles RN  Outcome: Progressing  6/30/2022 0625 by Steff Parker RN  Outcome: Progressing  Flowsheets (Taken 6/29/2022 2032)  Absence of Physical Injury: Implement safety measures based on patient assessment Problem: Neurosensory - Adult  Goal: Achieves stable or improved neurological status  6/30/2022 0918 by Malgorzata Kaplan RN  Outcome: Progressing  6/30/2022 0625 by Jaden Ceron RN  Outcome: Progressing  Flowsheets (Taken 6/29/2022 2000)  Achieves stable or improved neurological status:   Assess for and report changes in neurological status   Maintain blood pressure and fluid volume within ordered parameters to optimize cerebral perfusion and minimize risk of hemorrhage  Goal: Absence of seizures  6/30/2022 0918 by Malgorzata Kaplan RN  Outcome: Progressing  6/30/2022 0625 by Jaden Ceron RN  Outcome: Progressing  Flowsheets (Taken 6/29/2022 2000)  Absence of seizures: Monitor for seizure activity.   If seizure occurs, document type and location of movements and any associated apnea  Goal: Remains free of injury related to seizures activity  Outcome: Progressing  Goal: Achieves maximal functionality and self care  6/30/2022 0918 by Malgorzata Kaplan RN  Outcome: Progressing  6/30/2022 0625 by Jaden Ceron RN  Outcome: Progressing     Problem: Respiratory - Adult  Goal: Achieves optimal ventilation and oxygenation  Outcome: Progressing  Flowsheets (Taken 6/29/2022 2000 by Jaden Ceron RN)  Achieves optimal ventilation and oxygenation:   Assess for changes in respiratory status   Assess for changes in mentation and behavior   Position to facilitate oxygenation and minimize respiratory effort   Assess and instruct to report shortness of breath or any respiratory difficulty     Problem: Cardiovascular - Adult  Goal: Maintains optimal cardiac output and hemodynamic stability  6/30/2022 0918 by Malgorzata Kaplan RN  Outcome: Progressing  6/30/2022 0625 by Jaden Ceron RN  Outcome: Progressing  Flowsheets (Taken 6/29/2022 2000)  Maintains optimal cardiac output and hemodynamic stability:   Monitor blood pressure and heart rate   Monitor urine output and notify Licensed Independent Practitioner for values outside of normal range   Assess for signs of decreased cardiac output  Goal: Absence of cardiac dysrhythmias or at baseline  Outcome: Progressing  Flowsheets (Taken 6/29/2022 2000 by Steff Parker RN)  Absence of cardiac dysrhythmias or at baseline:   Monitor cardiac rate and rhythm   Assess for signs of decreased cardiac output     Problem: Skin/Tissue Integrity - Adult  Goal: Skin integrity remains intact  6/30/2022 0918 by Alden Charles RN  Outcome: Progressing  6/30/2022 0625 by Steff Parker RN  Outcome: Progressing  Flowsheets (Taken 6/29/2022 2032)  Skin Integrity Remains Intact: Assess vascular access sites hourly  Goal: Incisions, wounds, or drain sites healing without S/S of infection  Outcome: Progressing  Flowsheets (Taken 6/29/2022 2032 by Steff Parker RN)  Incisions, Wounds, or Drain Sites Healing Without Sign and Symptoms of Infection:   ADMISSION and DAILY: Assess and document risk factors for pressure ulcer development   TWICE DAILY: Assess and document skin integrity  Goal: Oral mucous membranes remain intact  Outcome: Progressing  Flowsheets (Taken 6/29/2022 2032 by Steff Parker RN)  Oral Mucous Membranes Remain Intact: Assess oral mucosa and hygiene practices     Problem: Musculoskeletal - Adult  Goal: Return mobility to safest level of function  6/30/2022 0918 by Alden Charles RN  Outcome: Progressing  6/30/2022 0625 by Steff Parker RN  Outcome: Progressing  Flowsheets (Taken 6/29/2022 2000)  Return Mobility to Safest Level of Function: Assess patient stability and activity tolerance for standing, transferring and ambulating with or without assistive devices  Goal: Maintain proper alignment of affected body part  Outcome: Progressing  Goal: Return ADL status to a safe level of function  Outcome: Progressing  Flowsheets (Taken 6/29/2022 2000 by Steff Parker RN)  Return ADL Status to a Safe Level of Function: Assess activities of daily living deficits and provide assistive devices as needed     Problem: Gastrointestinal - Adult  Goal: Minimal or absence of nausea and vomiting  6/30/2022 0918 by Jacqui Ferrell RN  Outcome: Progressing  6/30/2022 0625 by Ariana Marin RN  Outcome: Progressing  Goal: Maintains or returns to baseline bowel function  Outcome: Progressing  Goal: Maintains adequate nutritional intake  Outcome: Progressing  Goal: Establish and maintain optimal ostomy function  Outcome: Progressing     Problem: Genitourinary - Adult  Goal: Absence of urinary retention  Outcome: Progressing     Problem: Infection - Adult  Goal: Absence of infection at discharge  Outcome: Progressing  Flowsheets (Taken 6/29/2022 2000 by Ariana Marin RN)  Absence of infection at discharge:   Assess and monitor for signs and symptoms of infection   Monitor lab/diagnostic results   Monitor all insertion sites i.e., indwelling lines, tubes and drains  Goal: Absence of infection during hospitalization  Outcome: Progressing  Flowsheets (Taken 6/29/2022 2000 by Ariana Marin RN)  Absence of infection during hospitalization:   Monitor all insertion sites i.e., indwelling lines, tubes and drains   Monitor lab/diagnostic results   Assess and monitor for signs and symptoms of infection     Problem: Metabolic/Fluid and Electrolytes - Adult  Goal: Electrolytes maintained within normal limits  Outcome: Progressing  Flowsheets (Taken 6/29/2022 2000 by Ariana Marin RN)  Electrolytes maintained within normal limits: Monitor labs and assess patient for signs and symptoms of electrolyte imbalances  Goal: Hemodynamic stability and optimal renal function maintained  Outcome: Progressing  Flowsheets (Taken 6/29/2022 2000 by Ariana Marin RN)  Hemodynamic stability and optimal renal function maintained:   Monitor labs and assess for signs and symptoms of volume excess or deficit   Monitor intake, output and patient weight  Goal: Glucose maintained within prescribed range  Outcome: Progressing     Problem: Hematologic - Adult  Goal: Maintains hematologic stability  Outcome: Progressing  Flowsheets (Taken 6/29/2022 2000 by Princess Jerez RN)  Maintains hematologic stability:   Assess for signs and symptoms of bleeding or hemorrhage   Monitor labs for bleeding or clotting disorders

## 2022-06-30 NOTE — PROGRESS NOTES
Bedside handoff report received and patient assessment completed. A&O x4 with VSS. NIHSS 0. Sao2 maintained on RA. Denies any pain or discomfort. Heparin gtt infusing as ordered. Ambulates to BR with steady gait. POC discussed and all questions addressed. Will cont to monitor.

## 2022-07-22 ENCOUNTER — HOSPITAL ENCOUNTER (OUTPATIENT)
Dept: MRI IMAGING | Age: 67
Discharge: HOME | End: 2022-07-22
Payer: COMMERCIAL

## 2022-07-22 DIAGNOSIS — I63.032 CEREBRAL INFARCTION DUE TO THROMBOSIS OF LEFT CAROTID ARTERY (HCC): ICD-10-CM

## 2022-07-22 PROCEDURE — 70544 MR ANGIOGRAPHY HEAD W/O DYE: CPT

## 2022-07-22 PROCEDURE — 70547 MR ANGIOGRAPHY NECK W/O DYE: CPT

## 2022-07-22 RX ORDER — SODIUM CHLORIDE 9 MG/ML
INJECTION, SOLUTION INTRAVENOUS CONTINUOUS
Status: DISCONTINUED | OUTPATIENT
Start: 2022-07-22 | End: 2022-07-23 | Stop reason: HOSPADM

## 2022-07-28 ENCOUNTER — OFFICE VISIT (OUTPATIENT)
Dept: ENT CLINIC | Age: 67
End: 2022-07-28
Payer: COMMERCIAL

## 2022-07-28 VITALS — DIASTOLIC BLOOD PRESSURE: 71 MMHG | HEART RATE: 83 BPM | SYSTOLIC BLOOD PRESSURE: 126 MMHG | TEMPERATURE: 98.9 F

## 2022-07-28 DIAGNOSIS — F17.200 CURRENT SMOKER: ICD-10-CM

## 2022-07-28 DIAGNOSIS — M27.9 LESION OF MANDIBLE: Primary | ICD-10-CM

## 2022-07-28 PROCEDURE — 99213 OFFICE O/P EST LOW 20 MIN: CPT | Performed by: STUDENT IN AN ORGANIZED HEALTH CARE EDUCATION/TRAINING PROGRAM

## 2022-07-28 PROCEDURE — 1124F ACP DISCUSS-NO DSCNMKR DOCD: CPT | Performed by: STUDENT IN AN ORGANIZED HEALTH CARE EDUCATION/TRAINING PROGRAM

## 2022-07-28 NOTE — PROGRESS NOTES
[address] VISIT      Patient Name: Kathya [address] Record Number:  [id number]  Primary Care Physician:  Qing Burroughs MD    ChiefComplaint     Chief Complaint   Patient presents with    Other     Growth in mouth        History of Present Illness     Salena Blackburn is an 75 y.o. male previously seen for oral cyst, current smoker. Last seen 3/20/2022. Interval History:   He feels like the growth on the inside of his left jaw and near the floor of his mouth reappeared Sunday night. No pain except when he wears partial which is preventing him from wearing his partials. Still smoking.   Intermittent fluctuation of this area has been going on for 5 years, over the past year or 2 has been becoming more frequent    Past Medical History     Past Medical History:   Diagnosis Date    Arthritis     GERD (gastroesophageal reflux disease)     Hyperlipidemia     Hyperlipidemia 05/27/2012    Incomplete RBBB     Ruptured disk 2019    Stroke 06/25/2022    Varicose veins of both lower extremities        Past Surgical History     Past Surgical History:   Procedure Laterality Date    COLONOSCOPY  9/9/2020    COLONOSCOPY WITH BIOPSY performed by Steven Oropeza MD at [address] [place] [address] Street Bilateral 1/31/2022    BILATERAL ENDOVENOUS [address], RIGHT ENDOVENOUS [address], ([phone], [id number]) performed by Estella Fitzpatrick MD at [address] N/A 9/9/2020    EGD BIOPSY performed by Steven Oropeza MD at [facility] N/A 9/9/2020    EGD DILATION BALLOON performed by Steven Oropeza MD at [facility] Bilateral 1/31/2022    BILATERAL STAB PHLEBECTOMIES performed by Estella Fitzpatrick MD at [facility]. History     Family History   Problem Relation Age of Onset Breast Cancer Mother 61       Social History     Social History     Tobacco Use    Smoking status: Every Day     Packs/day: 0.50     Years: 54.00     Pack years: 27.00     Types: Cigarettes    Smokeless tobacco: Never   Vaping Use    Vaping Use: Never used   Substance Use Topics    Alcohol use: Not Currently     Comment: Pt reported that he stopped drinking 15 years ago. Drug use: Not Currently     Types: Opiates , Cocaine     Comment: Pt reported that he's been clean for 15 years. Allergies     Allergies   Allergen Reactions    Aspirin Hives     Patient reports he got hives while taking ASA    Pcn [Penicillins] Hives       Medications     Current Outpatient Medications   Medication Sig Dispense Refill    rosuvastatin (CRESTOR) 40 MG tablet Take 1 tablet by mouth nightly 30 tablet 3    apixaban starter pack (ELIQUIS DVT/PE STARTER PACK) 5 MG TBPK tablet Take 1 tablet by mouth See Admin Instructions 74 tablet 0    Multiple Vitamins-Minerals (THERAPEUTIC MULTIVITAMIN-MINERALS) tablet Take 1 tablet by mouth daily      KRILL OIL PO Take by mouth      omeprazole (PRILOSEC) 40 MG delayed release capsule TAKE 1 CAPSULE BY MOUTH EVERY DAY      [place]  MG CPCR Take 300 mg by mouth daily 90 capsule 3    sildenafil (VIAGRA) 100 MG tablet Take 1 tablet by mouth as needed for Erectile Dysfunction (Patient not taking: Reported on 7/28/2022) 10 tablet 5     No current facility-administered medications for this visit. Review of Systems     REVIEW OF SYSTEM: See HPI above    PhysicalExam     Vitals:    07/28/22 1258   BP: 126/71   Site: Left Upper Arm   Position: Sitting   Cuff Size: Medium Adult   Pulse: 83   Temp: 98.9 °F (37.2 °C)   TempSrc: Temporal       PHYSICAL EXAM  /71 (Site: Left Upper Arm, Position: Sitting, Cuff Size: Medium Adult)   Pulse 83   Temp 98.9 °F (37.2 °C) (Temporal)     GENERAL: No acute distress, alert and oriented. Smells of cigarette smoke.   EYES: EOMI, Anti-icteric. FACE: HB 1/6 bilaterally, symmetric appearing, sensation equal bilaterally  ORAL CAVITY: Along the posterior aspect of the left anterior mandible there is a 1.5 cm firm, nonmobile, questionably  cystic mass with mild tenderness to palpation. No other oral mass lesions. NECK: Normal range of motion, no thyromegaly, trachea is midline, no palpable lymphadenopathy or neck masses, no crepitus  CHEST: Normal respiratory effort, breathing comfortably, no retractions  SKIN: No rashes, normal appearing skin, no evidence of skin lesions/tumors  NEURO: Cranial Nerves 2, 3, 4, 5, 6, 7, 11, 12 grossly intact bilaterally     I have performed a head and neck physical exam personally or was physically present during the key or critical portions of the service. Assessment and Plan     1. Lesion of mandible  -We will further evaluate with CT max face. Follow-up after imaging to review results and discuss further work-up and treatment  - CT FACIAL BONES WO CONTRAST; Future    2. Current smoker  -Recommend smoking cessation    Follow-Up     Return for after imaging. Austyn Obrien   Department of Otolaryngology/Head and Neck Surgery  7/28/22    Medical Decision Making: The following items were considered in medical decision making:  Independent review of images  Review / order clinical lab tests  Review / order radiology tests  Decision to obtain old records      This note was generated completely or in part utilizing Dragon dictation speech recognition software. Occasionally, words are mistranscribed and despite editing, the text may contain inaccuracies due to incorrect word recognition. If further clarification is needed please contact the office at [phone].

## 2022-08-04 ENCOUNTER — HOSPITAL ENCOUNTER (OUTPATIENT)
Dept: CT IMAGING | Age: 67
Discharge: HOME | End: 2022-08-04
Payer: COMMERCIAL

## 2022-08-04 DIAGNOSIS — M27.9 LESION OF MANDIBLE: ICD-10-CM

## 2022-08-04 PROCEDURE — 70486 CT MAXILLOFACIAL W/O DYE: CPT

## 2022-08-11 ENCOUNTER — HOSPITAL ENCOUNTER (OUTPATIENT)
Dept: MRI IMAGING | Age: 67
Discharge: HOME | End: 2022-08-11

## 2022-08-11 DIAGNOSIS — I63.9 ISCHEMIC STROKE (HCC): ICD-10-CM

## 2022-08-18 ENCOUNTER — TELEPHONE (OUTPATIENT)
Dept: ENT CLINIC | Age: 67
End: 2022-08-18

## 2022-08-18 NOTE — TELEPHONE ENCOUNTER
Patient is calling to get the results of his CT scan. He would also like to know what the next steps are for him.

## 2022-08-19 NOTE — TELEPHONE ENCOUNTER
Called and spoke with the patient over the phone. 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  This is something that he needs to get into see an oral maxillofacial surgeon for. I gave him the name of a local [facility] in [facility], Dr. Makayla Thorne. I also told him he could contact the [facility] oral maxillofacial surgery department. Once getting established with OM[facility] if he needs any records from our office he will call our office as needed.

## 2023-03-17 ENCOUNTER — HOSPITAL ENCOUNTER (OUTPATIENT)
Dept: VASCULAR LAB | Age: 68
Discharge: HOME | End: 2023-03-17
Payer: COMMERCIAL

## 2023-03-17 DIAGNOSIS — I63.032 CEREBRAL INFARCTION DUE TO THROMBOSIS OF LEFT CAROTID ARTERY (HCC): ICD-10-CM

## 2023-03-17 PROCEDURE — 93880 EXTRACRANIAL BILAT STUDY: CPT

## 2023-10-02 ENCOUNTER — TELEPHONE (OUTPATIENT)
Dept: FAMILY MEDICINE CLINIC | Age: 68
End: 2023-10-02

## 2023-10-12 ENCOUNTER — HOSPITAL ENCOUNTER (OUTPATIENT)
Dept: VASCULAR LAB | Age: 68
Discharge: HOME | End: 2023-10-12
Payer: COMMERCIAL

## 2023-10-12 DIAGNOSIS — I63.032 CEREBRAL INFARCTION DUE TO THROMBOSIS OF LEFT CAROTID ARTERY (HCC): ICD-10-CM

## 2023-10-12 PROCEDURE — 93880 EXTRACRANIAL BILAT STUDY: CPT

## 2024-06-06 ENCOUNTER — HOSPITAL ENCOUNTER (OUTPATIENT)
Dept: VASCULAR LAB | Age: 69
Discharge: HOME | End: 2024-06-08
Attending: SINGLE SPECIALTY
Payer: COMMERCIAL

## 2024-06-06 DIAGNOSIS — I63.032 CEREBRAL INFARCTION DUE TO THROMBOSIS OF LEFT CAROTID ARTERY (HCC): ICD-10-CM

## 2024-06-06 LAB
VAS LEFT ARM BP: 130 MMHG
VAS LEFT CCA DIST EDV: 31.1 CM/S
VAS LEFT CCA DIST PSV: 112 CM/S
VAS LEFT CCA MID EDV: 31.8 CM/S
VAS LEFT CCA MID PSV: 97.1 CM/S
VAS LEFT CCA PROX EDV: 20.3 CM/S
VAS LEFT CCA PROX PSV: 69.1 CM/S
VAS LEFT DIST OUTFLOW EDV: 26.6 CM/S
VAS LEFT DIST OUTFLOW PSV: 96.7 CM/S
VAS LEFT ECA PSV: 252 CM/S
VAS LEFT ICA DIST EDV: 23.8 CM/S
VAS LEFT ICA DIST PSV: 98.1 CM/S
VAS LEFT ICA MID EDV: 26.6 CM/S
VAS LEFT ICA MID PSV: 96.7 CM/S
VAS LEFT ICA PROX EDV: 38.1 CM/S
VAS LEFT ICA PROX PSV: 137 CM/S
VAS LEFT ICA/CCA PSV: 1.41
VAS LEFT INFLOW ARTERY EDV: 31.1 CM/S
VAS LEFT INFLOW ARTERY PSV: 112 CM/S
VAS LEFT OUTFLOW VESSEL EDV: 23.8 CM/S
VAS LEFT OUTFLOW VESSEL PSV: 98.1 CM/S
VAS LEFT PROX OUTFLOW EDV: 38.1 CM/S
VAS LEFT PROX OUTFLOW PSV: 137 CM/S
VAS LEFT SUBCLAVIAN PROX PSV: 133 CM/S
VAS LEFT VERTEBRAL PSV: 51.3 CM/S
VAS RIGHT ARM BP: 128 MMHG
VAS RIGHT CCA DIST EDV: 21.1 CM/S
VAS RIGHT CCA DIST PSV: 83.9 CM/S
VAS RIGHT CCA MID EDV: 20.5 CM/S
VAS RIGHT CCA MID PSV: 99.4 CM/S
VAS RIGHT CCA PROX EDV: 19.3 CM/S
VAS RIGHT CCA PROX PSV: 98.8 CM/S
VAS RIGHT ECA PSV: 66.1 CM/S
VAS RIGHT GRAFT 1: NORMAL
VAS RIGHT ICA DIST EDV: 30.8 CM/S
VAS RIGHT ICA DIST PSV: 78.8 CM/S
VAS RIGHT ICA MID EDV: 17.1 CM/S
VAS RIGHT ICA MID PSV: 56.7 CM/S
VAS RIGHT ICA PROX EDV: 14 CM/S
VAS RIGHT ICA PROX PSV: 39.5 CM/S
VAS RIGHT ICA/CCA PSV: 0.57
VAS RIGHT SUBCLAVIAN PROX PSV: 118 CM/S
VAS RIGHT VERTEBRAL PSV: 43.6 CM/S

## 2024-06-06 PROCEDURE — 93880 EXTRACRANIAL BILAT STUDY: CPT

## 2025-05-14 ENCOUNTER — TRANSCRIBE ORDERS (OUTPATIENT)
Dept: ADMINISTRATIVE | Age: 70
End: 2025-05-14

## 2025-05-14 DIAGNOSIS — I63.032 CEREBRAL INFARCTION DUE TO THROMBOSIS OF LEFT CAROTID ARTERY (HCC): Primary | ICD-10-CM

## 2025-06-12 ENCOUNTER — HOSPITAL ENCOUNTER (OUTPATIENT)
Dept: VASCULAR LAB | Age: 70
Discharge: HOME | End: 2025-06-14
Attending: SINGLE SPECIALTY
Payer: COMMERCIAL

## 2025-06-12 DIAGNOSIS — I63.032 CEREBRAL INFARCTION DUE TO THROMBOSIS OF LEFT CAROTID ARTERY (HCC): ICD-10-CM

## 2025-06-12 LAB
VAS LEFT ARM BP: 135 MMHG
VAS LEFT CCA DIST EDV: 25.9 CM/S
VAS LEFT CCA DIST PSV: 82.7 CM/S
VAS LEFT CCA MID EDV: 26.6 CM/S
VAS LEFT CCA MID PSV: 87.6 CM/S
VAS LEFT CCA PROX EDV: 17.2 CM/S
VAS LEFT CCA PROX PSV: 56 CM/S
VAS LEFT ECA EDV: 10.2 CM/S
VAS LEFT ECA PSV: 52.5 CM/S
VAS LEFT ICA DIST EDV: 36.4 CM/S
VAS LEFT ICA DIST PSV: 96.2 CM/S
VAS LEFT ICA MID EDV: 40.7 CM/S
VAS LEFT ICA MID PSV: 106 CM/S
VAS LEFT ICA PROX EDV: 28.6 CM/S
VAS LEFT ICA PROX PSV: 101 CM/S
VAS LEFT ICA/CCA PSV: 1.21
VAS LEFT SUBCLAVIAN PROX EDV: 1.6 CM/S
VAS LEFT SUBCLAVIAN PROX PSV: 119 CM/S
VAS LEFT VERTEBRAL EDV: 21.4 CM/S
VAS LEFT VERTEBRAL PSV: 49.9 CM/S
VAS RIGHT ARM BP: 79 MMHG
VAS RIGHT ARTERIAL PROX ANASTOMOSIS EDV: 27.7 CM/S
VAS RIGHT ARTERIAL PROX ANASTOMOSIS PSV: 105 CM/S
VAS RIGHT CCA DIST EDV: 21.3 CM/S
VAS RIGHT CCA DIST PSV: 84.3 CM/S
VAS RIGHT CCA MID EDV: 24.2 CM/S
VAS RIGHT CCA MID PSV: 92 CM/S
VAS RIGHT CCA PROX EDV: 19.4 CM/S
VAS RIGHT CCA PROX PSV: 78.4 CM/S
VAS RIGHT DIST OUTFLOW EDV: 23.4 CM/S
VAS RIGHT DIST OUTFLOW PSV: 80.6 CM/S
VAS RIGHT ECA EDV: 18.2 CM/S
VAS RIGHT ECA PSV: 72.2 CM/S
VAS RIGHT GRAFT 1: NORMAL
VAS RIGHT ICA DIST EDV: 28 CM/S
VAS RIGHT ICA DIST PSV: 89 CM/S
VAS RIGHT ICA MID EDV: 27.3 CM/S
VAS RIGHT ICA MID PSV: 86.9 CM/S
VAS RIGHT ICA PROX EDV: 28 CM/S
VAS RIGHT ICA PROX PSV: 74.3 CM/S
VAS RIGHT ICA/CCA PSV: 0.94
VAS RIGHT INFLOW ARTERY EDV: 25.9 CM/S
VAS RIGHT INFLOW ARTERY PSV: 82.7 CM/S
VAS RIGHT MID OUTFLOW EDV: 28.6 CM/S
VAS RIGHT MID OUTFLOW PSV: 101 CM/S
VAS RIGHT PROX OUTFLOW EDV: 32.1 CM/S
VAS RIGHT PROX OUTFLOW PSV: 101 CM/S
VAS RIGHT SUBCLAVIAN PROX PSV: 102 CM/S
VAS RIGHT VERTEBRAL EDV: 8.62 CM/S
VAS RIGHT VERTEBRAL PSV: 36.5 CM/S

## 2025-06-12 PROCEDURE — 93880 EXTRACRANIAL BILAT STUDY: CPT

## 2025-06-12 PROCEDURE — 93880 EXTRACRANIAL BILAT STUDY: CPT | Performed by: INTERNAL MEDICINE

## (undated) DEVICE — FORCEPS BX L240CM JAW DIA2.8MM L CAP W/ NDL MIC MESH TOOTH

## (undated) DEVICE — APPLICATOR PREP 26ML 0.7% IOD POVACRYLEX 74% ISO ALC ST

## (undated) DEVICE — GUIDEWIRE VASC L180CM DIA0.018IN STR TIP L5CM PTFE SPR PROX

## (undated) DEVICE — INTENDED FOR TISSUE SEPARATION, AND OTHER PROCEDURES THAT REQUIRE A SHARP SURGICAL BLADE TO PUNCTURE OR CUT.: Brand: BARD-PARKER ® STAINLESS STEEL BLADES

## (undated) DEVICE — STRIP,CLOSURE,WOUND,MEDI-STRIP,1/2X4: Brand: MEDLINE

## (undated) DEVICE — TOWEL,OR,DSP,ST,BLUE,STD,6/PK,12PK/CS: Brand: MEDLINE

## (undated) DEVICE — SYRINGE, LUER LOCK, 30ML: Brand: MEDLINE

## (undated) DEVICE — KNIFE OPHTH W2.5MM 55DEG CRESC BVL UP ANG XSTAR

## (undated) DEVICE — PROVE COVER: Brand: UNBRANDED

## (undated) DEVICE — MAJOR SET UP PK

## (undated) DEVICE — STERILE POLYISOPRENE POWDER-FREE SURGICAL GLOVES: Brand: PROTEXIS

## (undated) DEVICE — SPONGE LAP W18XL18IN WHT COT 4 PLY FLD STRUNG RADPQ DISP ST

## (undated) DEVICE — SUTURE PERMA-HAND SZ 2-0 L30IN NONABSORBABLE BLK L26MM SH K833H

## (undated) DEVICE — CATHETER ABLAT 7FR L100CM 0.025IN ENDOVENOUS RF CLOSUREFAST

## (undated) DEVICE — DRAPE,REIN 53X77,STERILE: Brand: MEDLINE

## (undated) DEVICE — COVER LT HNDL BLU PLAS

## (undated) DEVICE — BANDAGE COMPR W6INXL10YD ST M E WHITE/BEIGE

## (undated) DEVICE — ESOPHAGEAL BALLOON DILATATION CATHETER: Brand: CRE FIXED WIRE

## (undated) DEVICE — GOWN SIRUS NONREIN XL W/TWL: Brand: MEDLINE INDUSTRIES, INC.

## (undated) DEVICE — TOWEL,OR,DSP,ST,BLUE,STD,4/PK,20PK/CS: Brand: MEDLINE

## (undated) DEVICE — BANDAGE,GAUZE,BULKEE II,4.5"X4.1YD,STRL: Brand: MEDLINE

## (undated) DEVICE — SET INTRO SHTH MIC L7CM DIA7FR 0.018IN NDL L2.75IN DIA21GA

## (undated) DEVICE — GAUZE,SPONGE,4"X4",8PLY,STRL,LF,10/TRAY: Brand: MEDLINE

## (undated) DEVICE — BLADE CLIPPER GEN PURP NS

## (undated) DEVICE — BLANKET WRM W29.9XL79.1IN UP BODY FORC AIR MISTRAL-AIR

## (undated) DEVICE — SET EXTN PRIMING 4.9ML L30IN INCL SLDE CLMP SPIN M LUERLOCK

## (undated) DEVICE — MERCY FAIRFIELD TURNOVER KIT: Brand: MEDLINE INDUSTRIES, INC.

## (undated) DEVICE — DRAPE,SPLIT ,77X120: Brand: MEDLINE

## (undated) DEVICE — DECANTER FLD 9IN ST BG FOR ASEP TRNSF OF FLD

## (undated) DEVICE — KIT MICROINTRODUCER 4FR ECHOGENIC NDL L7CM 21GA STIFF COAX

## (undated) DEVICE — SYRINGE, LUER LOCK, 10ML: Brand: MEDLINE

## (undated) DEVICE — FINISHING WIRE: Brand: FINISHING WIRE® SUPPORTRAK®

## (undated) DEVICE — GEL US 20GM NONIRRITATING OVERWRAPPED FILE PCH TRNSMIT